# Patient Record
Sex: FEMALE | Race: WHITE | NOT HISPANIC OR LATINO | Employment: UNEMPLOYED | URBAN - METROPOLITAN AREA
[De-identification: names, ages, dates, MRNs, and addresses within clinical notes are randomized per-mention and may not be internally consistent; named-entity substitution may affect disease eponyms.]

---

## 2017-01-05 ENCOUNTER — ALLSCRIPTS OFFICE VISIT (OUTPATIENT)
Dept: OTHER | Facility: OTHER | Age: 47
End: 2017-01-05

## 2017-01-06 ENCOUNTER — HOSPITAL ENCOUNTER (OUTPATIENT)
Dept: MAMMOGRAPHY | Facility: CLINIC | Age: 47
Discharge: HOME | End: 2017-01-06
Payer: COMMERCIAL

## 2017-01-06 ENCOUNTER — HOSPITAL ENCOUNTER (OUTPATIENT)
Dept: ULTRASOUND IMAGING | Facility: CLINIC | Age: 47
Discharge: HOME | End: 2017-01-06
Payer: COMMERCIAL

## 2017-01-06 DIAGNOSIS — M79.9 SOFT TISSUE DISORDER: ICD-10-CM

## 2017-01-06 DIAGNOSIS — IMO0002 LUMP: ICD-10-CM

## 2017-01-06 DIAGNOSIS — N63.0 LUMP OR MASS IN BREAST: ICD-10-CM

## 2017-01-06 PROCEDURE — G0204 DX MAMMO INCL CAD BI: HCPCS

## 2017-01-06 PROCEDURE — 76642 ULTRASOUND BREAST LIMITED: CPT

## 2017-01-06 RX ORDER — IBUPROFEN 200 MG
200 TABLET ORAL EVERY 6 HOURS PRN
COMMUNITY
End: 2019-07-08 | Stop reason: ALTCHOICE

## 2017-01-09 ENCOUNTER — GENERIC CONVERSION - ENCOUNTER (OUTPATIENT)
Dept: OTHER | Facility: OTHER | Age: 47
End: 2017-01-09

## 2017-01-10 ENCOUNTER — HOSPITAL ENCOUNTER (OUTPATIENT)
Dept: MAMMOGRAPHY | Facility: CLINIC | Age: 47
Discharge: HOME | End: 2017-01-10
Payer: COMMERCIAL

## 2017-01-10 ENCOUNTER — HOSPITAL ENCOUNTER (OUTPATIENT)
Dept: ULTRASOUND IMAGING | Facility: CLINIC | Age: 47
Discharge: HOME | End: 2017-01-10
Admitting: INTERNAL MEDICINE
Payer: COMMERCIAL

## 2017-01-10 ENCOUNTER — HOSPITAL ENCOUNTER (OUTPATIENT)
Dept: ULTRASOUND IMAGING | Facility: CLINIC | Age: 47
Discharge: HOME | End: 2017-01-10
Payer: COMMERCIAL

## 2017-01-10 VITALS — DIASTOLIC BLOOD PRESSURE: 72 MMHG | HEART RATE: 68 BPM | SYSTOLIC BLOOD PRESSURE: 118 MMHG

## 2017-01-10 DIAGNOSIS — R92.8 OTHER ABNORMAL AND INCONCLUSIVE FINDINGS ON DIAGNOSTIC IMAGING OF BREAST: ICD-10-CM

## 2017-01-10 DIAGNOSIS — R92.8 ABNORMAL MAMMOGRAM, UNSPECIFIED: ICD-10-CM

## 2017-01-10 PROCEDURE — 19083 BX BREAST 1ST LESION US IMAG: CPT

## 2017-01-10 PROCEDURE — 88305 TISSUE EXAM BY PATHOLOGIST: CPT | Performed by: INTERNAL MEDICINE

## 2017-01-10 RX ORDER — LIDOCAINE HYDROCHLORIDE 10 MG/ML
4 INJECTION, SOLUTION INFILTRATION; PERINEURAL ONCE
Status: COMPLETED | OUTPATIENT
Start: 2017-01-10 | End: 2017-01-10

## 2017-01-10 RX ADMIN — LIDOCAINE HYDROCHLORIDE 4 ML: 10 INJECTION, SOLUTION INFILTRATION; PERINEURAL at 11:34

## 2017-01-12 ENCOUNTER — GENERIC CONVERSION - ENCOUNTER (OUTPATIENT)
Dept: OTHER | Facility: OTHER | Age: 47
End: 2017-01-12

## 2017-01-19 ENCOUNTER — GENERIC CONVERSION - ENCOUNTER (OUTPATIENT)
Dept: OTHER | Facility: OTHER | Age: 47
End: 2017-01-19

## 2017-05-11 ENCOUNTER — ALLSCRIPTS OFFICE VISIT (OUTPATIENT)
Dept: OTHER | Facility: OTHER | Age: 47
End: 2017-05-11

## 2017-07-05 ENCOUNTER — ALLSCRIPTS OFFICE VISIT (OUTPATIENT)
Dept: OTHER | Facility: OTHER | Age: 47
End: 2017-07-05

## 2017-07-05 DIAGNOSIS — Z00.00 ENCOUNTER FOR GENERAL ADULT MEDICAL EXAMINATION WITHOUT ABNORMAL FINDINGS: ICD-10-CM

## 2017-07-05 DIAGNOSIS — R79.9 ABNORMAL FINDING OF BLOOD CHEMISTRY: ICD-10-CM

## 2017-07-11 ENCOUNTER — APPOINTMENT (OUTPATIENT)
Dept: LAB | Facility: CLINIC | Age: 47
End: 2017-07-11
Payer: COMMERCIAL

## 2017-07-11 ENCOUNTER — TRANSCRIBE ORDERS (OUTPATIENT)
Dept: LAB | Facility: CLINIC | Age: 47
End: 2017-07-11

## 2017-07-11 ENCOUNTER — GENERIC CONVERSION - ENCOUNTER (OUTPATIENT)
Dept: OTHER | Facility: OTHER | Age: 47
End: 2017-07-11

## 2017-07-11 DIAGNOSIS — R79.9 ABNORMAL FINDING OF BLOOD CHEMISTRY: ICD-10-CM

## 2017-07-11 DIAGNOSIS — Z00.00 ENCOUNTER FOR GENERAL ADULT MEDICAL EXAMINATION WITHOUT ABNORMAL FINDINGS: ICD-10-CM

## 2017-07-11 LAB
ALBUMIN SERPL BCP-MCNC: 3.7 G/DL (ref 3.5–5)
ALP SERPL-CCNC: 58 U/L (ref 46–116)
ALT SERPL W P-5'-P-CCNC: 20 U/L (ref 12–78)
ANION GAP SERPL CALCULATED.3IONS-SCNC: 9 MMOL/L (ref 4–13)
AST SERPL W P-5'-P-CCNC: 14 U/L (ref 5–45)
BASOPHILS # BLD AUTO: 0.02 THOUSANDS/ΜL (ref 0–0.1)
BASOPHILS NFR BLD AUTO: 0 % (ref 0–1)
BILIRUB SERPL-MCNC: 0.6 MG/DL (ref 0.2–1)
BUN SERPL-MCNC: 11 MG/DL (ref 5–25)
CALCIUM SERPL-MCNC: 8.7 MG/DL (ref 8.3–10.1)
CHLORIDE SERPL-SCNC: 103 MMOL/L (ref 100–108)
CHOLEST SERPL-MCNC: 160 MG/DL (ref 50–200)
CO2 SERPL-SCNC: 28 MMOL/L (ref 21–32)
CREAT SERPL-MCNC: 0.74 MG/DL (ref 0.6–1.3)
EOSINOPHIL # BLD AUTO: 0.09 THOUSAND/ΜL (ref 0–0.61)
EOSINOPHIL NFR BLD AUTO: 2 % (ref 0–6)
ERYTHROCYTE [DISTWIDTH] IN BLOOD BY AUTOMATED COUNT: 13 % (ref 11.6–15.1)
GFR SERPL CREATININE-BSD FRML MDRD: >60 ML/MIN/1.73SQ M
GLUCOSE P FAST SERPL-MCNC: 90 MG/DL (ref 65–99)
HCT VFR BLD AUTO: 39.8 % (ref 34.8–46.1)
HDLC SERPL-MCNC: 72 MG/DL (ref 40–60)
HGB BLD-MCNC: 12.9 G/DL (ref 11.5–15.4)
LDLC SERPL CALC-MCNC: 79 MG/DL (ref 0–100)
LYMPHOCYTES # BLD AUTO: 1.17 THOUSANDS/ΜL (ref 0.6–4.47)
LYMPHOCYTES NFR BLD AUTO: 20 % (ref 14–44)
MCH RBC QN AUTO: 30.3 PG (ref 26.8–34.3)
MCHC RBC AUTO-ENTMCNC: 32.4 G/DL (ref 31.4–37.4)
MCV RBC AUTO: 93 FL (ref 82–98)
MONOCYTES # BLD AUTO: 0.57 THOUSAND/ΜL (ref 0.17–1.22)
MONOCYTES NFR BLD AUTO: 10 % (ref 4–12)
NEUTROPHILS # BLD AUTO: 4.02 THOUSANDS/ΜL (ref 1.85–7.62)
NEUTS SEG NFR BLD AUTO: 68 % (ref 43–75)
PLATELET # BLD AUTO: 225 THOUSANDS/UL (ref 149–390)
PMV BLD AUTO: 11.8 FL (ref 8.9–12.7)
POTASSIUM SERPL-SCNC: 3.7 MMOL/L (ref 3.5–5.3)
PROT SERPL-MCNC: 7.2 G/DL (ref 6.4–8.2)
RBC # BLD AUTO: 4.26 MILLION/UL (ref 3.81–5.12)
SODIUM SERPL-SCNC: 140 MMOL/L (ref 136–145)
TRIGL SERPL-MCNC: 43 MG/DL
WBC # BLD AUTO: 5.87 THOUSAND/UL (ref 4.31–10.16)

## 2017-07-11 PROCEDURE — 85025 COMPLETE CBC W/AUTO DIFF WBC: CPT

## 2017-07-11 PROCEDURE — 80053 COMPREHEN METABOLIC PANEL: CPT

## 2017-07-11 PROCEDURE — 36415 COLL VENOUS BLD VENIPUNCTURE: CPT

## 2017-07-11 PROCEDURE — 80061 LIPID PANEL: CPT

## 2017-12-11 ENCOUNTER — ALLSCRIPTS OFFICE VISIT (OUTPATIENT)
Dept: OTHER | Facility: OTHER | Age: 47
End: 2017-12-11

## 2017-12-11 DIAGNOSIS — D25.9 LEIOMYOMA OF UTERUS: ICD-10-CM

## 2017-12-11 DIAGNOSIS — Z12.31 ENCOUNTER FOR SCREENING MAMMOGRAM FOR MALIGNANT NEOPLASM OF BREAST: ICD-10-CM

## 2017-12-12 NOTE — PROGRESS NOTES
Assessment    1  Encounter for routine gynecological examination (V72 31) (Z01 419)   2  Visit for screening mammogram (V76 12) (Z12 31)   3  Pap smear, as part of routine gynecological examination (V76 2) (Z01 419)   4  Uterine leiomyoma, unspecified location (218 9) (D25 9)    Plan  Encounter for routine gynecological examination    · Follow-up visit in 1 year Evaluation and Treatment  Follow-up  Status: Hold For -Scheduling  Requested for: 92SUO5001   Ordered;Encounter for routine gynecological examination; Ordered By: Roger Bobby Performed:  Due: 24WKD6879  Encounter for routine gynecological examination, Pap smear, as part of routinegynecological examination    · (1) THIN PREP PAP WITH IMAGING; Status: In Progress - Specimen/DataCollected,Retrospective By Protocol Authorization;   Done: 62RPM1912   Perform:[facility]; Order Comments:CervicalEndocervical; Due:74Jcw3316; Last Updated By:John Joaquin; 12/11/2017 9:48:12 AM;Ordered;for routine gynecological examination, Pap smear, as part of routine gynecological examination; Ordered By:Haily Shepherd; Maturation index required? : No  : 11/17/2017  HPV? : Regardless of Interpretation  Uterine fibroid    · * US PELVIS COMPLETE (TRANSABDOMINAL AND TRANSVAGINAL); Status:Hold For -Scheduling,Retrospective By Protocol Authorization; Requested for:[id number];    Perform:[facility] Radiology; Due:40Hkk5291; Last Updated By:John Joaquin; 12/11/2017 10:52:28 AM;Ordered;fibroid; Ordered By:Haily Shepherd; Visit for screening mammogram    · * MAMMO SCREENING BILATERAL W CAD; Status:Hold For - ExactDate,Scheduling,Retrospective By Protocol Authorization; Requested for:Jan 2018; Perform:[facility] Radiology; JOQ:[id number]; Last Updated By:John Joaquin; 12/11/2017 9:48:12 AM;Ordered;for screening mammogram; Ordered By:Haily Shepherd; Discussion/Summary  healthy adult female Currently, she eats a healthy diet   cervical cancer screening is current Pap test was done today Breast cancer screening: monthly self breast exam was advised, mammogram is current and mammogram has been ordered  Advice and education were given regarding nutrition, aerobic exercise, calcium supplements, vitamin D supplements and sunscreen use  Normal GYN Exam  Stressed  ordered  SMear DOne  GYN Exam in 1 year  if any problems develop during the interim        ***Check PELVIC US to ensue Fibroid Stability  The patient has the current Goals: [address]  The patent has the current Barriers: None  Patient is able to Self-Care  PATIENT EDUCATION RECORD She was given the following educational materials:  Ideas for a Healthy Life Style  The treatment plan was reviewed with the patient/guardian  The patient/guardian understands and agrees with the treatment plan     Self Referrals: No      Chief Complaint  Annual Visithas no problem or concernis doing very      History of Present Illness  HPI: Periods are STABLE 16 - 26 days No erratic bleeding    Moderate cramping  Bowel & Bladder habits  pelvic or abdominal pain   GYN , Adult Female [facility]: The patient is being seen for a health maintenance and gynecology evaluation  The last health maintenance visit was 1 year(s) ago  General Health: The patient's health since the last visit is described as good  She has regular dental visits  -- She denies vision problems  -- She denies hearing loss  Immunizations status: up to date  Lifestyle:  She consumes a diverse and healthy diet  -- She does not have any weight concerns  -- She exercises regularly  -- She does not use tobacco -- She denies alcohol use  -- She denies drug use  Reproductive health:  she reports no menstrual problems  Screening: cancer screening reviewed and current  metabolic screening reviewed and current  risk screening reviewed and current  Review of Systems   Constitutional: No fever, no chills, feels well, no tiredness, no recent weight gain or loss    ENT: no ear ache, no loss of hearing, no nosebleeds or nasal discharge, no sore throat or hoarseness  Cardiovascular: no complaints of slow or fast heart rate, no chest pain, no palpitations, no leg claudication or lower extremity edema  Respiratory: no complaints of shortness of breath, no wheezing, no dyspnea on exertion, no orthopnea or PND  Breasts: no complaints of breast pain, breast lump or nipple discharge  Gastrointestinal: no complaints of abdominal pain, no constipation, no nausea or diarrhea, no vomiting, no bloody stools  Genitourinary: no complaints of dysuria, no incontinence, no pelvic pain, no dysmenorrhea, no vaginal discharge or abnormal vaginal bleeding  Musculoskeletal: no complaints of arthralgia, no myalgia, no joint swelling or stiffness, no limb pain or swelling  Integumentary: no complaints of skin rash or lesion, no itching or dry skin, no skin wounds  Neurological: no complaints of headache, no confusion, no numbness or tingling, no dizziness or fainting  Active Problems    1  Abnormal blood chemistry (790 6) (R79 9)   2  Abnormal mammogram (793 80) (R92 8)   3  Abnormal uterine bleeding (AUB) (626 9) (N93 9)   4  Acute bronchitis (466 0) (J20 9)   5  Acute otitis media (382 9) (H66 90)   6  Cervicalgia (723 1) (M54 2)   7  Chronic low back pain (724 2,338 29) (M54 5,G89 29)   8  Depression with anxiety (300 4) (F41 8)   9  Encounter for routine gynecological examination (V72 31) (Z01 419)   10  Encounter for screening mammogram for malignant neoplasm of breast (V76 12)  (Z12 31)   11  Fibroadenoma of right breast (217) (D24 1)   12  Headache (784 0) (R51)   13  Lyme disease (088 81) (A69 20)   14  Neck pain (723 1) (M54 2)   15  Need for influenza vaccination (V04 81) (Z23)   16  Need for prophylactic vaccination and inoculation against influenza (V04 81) (Z23)   17  Neuralgia (729 2) (M79 2)   18  Nevus, atypical (216 9) (D22 9)   19  Obsessive compulsive disorder (300 3) (F42 9)   20   Occipital neuralgia (723 8) (M54 81)   21  Pain in joint of left shoulder (719 41) (M25 512)   22  Pap smear, as part of routine gynecological examination (V76 2) (Z01 419)   23  Right hip pain (719 45) (M25 551)   24  Routine Gynecological Exam With Cervical Pap Smear (V72 31)   25  Soft tissue mass (729 90) (M79 9)   26  Urinary frequency (788 41) (R35 0)   27  Uterine fibroid (218 9) (D25 9)   28  Uterine leiomyoma, unspecified location (218 9) (D25 9)   29  UTI (urinary tract infection) (599 0) (N39 0)   30  Visit for screening mammogram (V76 12) (Z12 31)   31   Visual disturbances (368 9) (H53 9)    Past Medical History   · History of Abnormal uterine bleeding (AUB) (626 9) (N93 9)   · Acute sinusitis (461 9) (J01 90)   · Acute upper respiratory infection (465 9) (J06 9)   · History of pregnancy (V13 29)   · History of Mammogram   · History of Menarche (V21 8)   · History of Reported Pap Smear   · History of Uses birth control (V25 9) (Z30 9)   · History of Uterine leiomyoma, unspecified location (218 9) (D25 9)    Surgical History   · History of Biopsy Breast Percutaneous Needle Core   · History of  Section   · History of Oral Surgery Tooth Extraction   · History of Tubal Ligation    Family History  Mother    · No pertinent family history  Maternal Uncle    · Family history of Colon cancer   · Family history of malignant neoplasm of prostate (V16 42) (Z80 45)  Family History    · Family history of Family Health Status Of Father -    · Family history of Family Health Status Of Mother -    · Family history of Maternal Grandfather Is    · Family history of Maternal Grandmother Is    · Family history of Paternal Grandfather Is    · Family history of Paternal Grandmother Is     Social History   · Being A Social Drinker   · Daily Coffee Consumption (2  Cups/Day)   · Denied: History of Drug Use   · Educational Level - Completed 2nd Year College   · Denied: History of Home Environment Domestic Violence   · Living Independently With Spouse   · Marital History - Currently    · Never a smoker   · Non-smoker (V49 89) (Z78 9)   · Occupation: Homemaker    Current Meds   1  No Reported Medications  Requested for: 65KJL5514 Recorded    Allergies  1  No Known Drug Allergies  2  No Known Environmental Allergies   3  No Known Food Allergies    Vitals   Recorded: 61TZK6340 77:87TM   Systolic 193   Diastolic 60   Height 5 ft 2 5 in   Weight 147 lb 8 oz   BMI Calculated 26 55   BSA Calculated 1 69   LMP 91AYT0561       Physical Exam   Constitutional  General appearance: No acute distress, well appearing and well nourished  Neck  Neck: Normal, supple, trachea midline, no masses  Thyroid: Normal, no thyromegaly  Pulmonary  Respiratory effort: No increased work of breathing or signs of respiratory distress  Auscultation of lungs: Clear to auscultation  Cardiovascular  Auscultation of heart: Normal rate and rhythm, normal S1 and S2, no murmurs  Peripheral vascular exam: Normal pulses Throughout  Genitourinary  External genitalia: Normal and no lesions appreciated  Vagina: Normal, no lesions or dryness appreciated  Urethra: Normal    Urethral meatus: Normal    Bladder: Normal, soft, non-tender and no prolapse or masses appreciated  Cervix: Normal, no palpable masses  Uterus: Normal, non-tender, not enlarged, and no palpable masses  Adnexa/parametria: Normal, non-tender and no fullness or masses appreciated  Anus, perineum, and rectum: Normal sphincter tone, no masses, and no prolapse  Chest  Breasts: Normal and no dimpling or skin changes noted  Abdomen  Abdomen: Normal, non-tender, and no organomegaly noted  Liver and spleen: No hepatomegaly or splenomegaly  Examination for hernias: No hernias appreciated  Stool sample for occult blood: Negative     Lymphatic  Palpation of lymph nodes in neck, axillae, groin and/or other locations: No lymphadenopathy or masses noted    Skin  Skin and subcutaneous tissue: Normal skin turgor and no rashes     Palpation of skin and subcutaneous tissue: Normal    Psychiatric  Orientation to person, place, and time: Normal    Mood and affect: Normal        Provider Comments  Provider Comments:   4 children doing well    Oldest wants [employer] in [address] to go to  [address] is 17 then boy at 16 Daughter at 8      Future Appointments    Date/Time Provider Specialty Site   07/06/2018 08:00 AM Darwin Garzon DO Internal [address]   12/28/2017 02:00 PM [address] [place] [address] OB/GYN       Signatures   Electronically signed by : JEET Foley ; Dec 11 2017  1:21PM EST                       (Author)

## 2017-12-13 ENCOUNTER — LAB CONVERSION - ENCOUNTER (OUTPATIENT)
Dept: OTHER | Facility: OTHER | Age: 47
End: 2017-12-13

## 2017-12-13 LAB
ADDITIONAL INFORMATION (HISTORICAL): NORMAL
ADEQUACY: (HISTORICAL): NORMAL
COMMENT (HISTORICAL): NORMAL
CYTOTECHNOLOGIST: (HISTORICAL): NORMAL
HPV MRNA E6/E7 (HISTORICAL): NOT DETECTED
INTERPRETATION (HISTORICAL): NORMAL
LMP (HISTORICAL): NORMAL
PREV. BX: (HISTORICAL): NORMAL
PREV. PAP (HISTORICAL): NORMAL
SOURCE (HISTORICAL): NORMAL

## 2018-01-06 DIAGNOSIS — Z12.31 ENCOUNTER FOR SCREENING MAMMOGRAM FOR MALIGNANT NEOPLASM OF BREAST: ICD-10-CM

## 2018-01-09 NOTE — RESULT NOTES
Message   Notify the patient normal comprehensive metabolic panel follow up as scheduled        Verified Results  (1) COMPREHENSIVE METABOLIC PANEL 44JWQ7080 74:70VM Brie Bur   TW Order Number: [id number]  TW Order Number: [id number] Order Number: [id number]     Test Name Result Flag Reference   GLUCOSE,RANDM 91 mg/dL     If the patient is fasting, the ADA then defines impaired fasting glucose as > 100 mg/dL and diabetes as > or equal to 123 mg/dL  SODIUM 140 mmol/L  136-145   POTASSIUM 4 4 mmol/L  3 5-5 3   CHLORIDE 103 mmol/L  100-108   CARBON DIOXIDE 30 mmol/L  21-32   ANION GAP (CALC) 7 mmol/L  4-13   BLOOD UREA NITROGEN 13 mg/dL  5-25   CREATININE 0 83 mg/dL  0 60-1 30   Standardized to IDMS reference method   CALCIUM 8 9 mg/dL  8 3-10 1   BILI, TOTAL 0 60 mg/dL  0 20-1 00   ALK PHOSPHATAS 57 U/L     ALT (SGPT) 24 U/L  12-78   AST(SGOT) 21 U/L  5-45   ALBUMIN 4 0 g/dL  3 5-5 0   TOTAL PROTEIN 7 4 g/dL  6 4-8 2   eGFR Non-African American      >60 0 ml/min/1 73sq [facility] Disease Education Program recommendations are as follows:  GFR calculation is accurate only with a steady state creatinine  Chronic Kidney disease less than 60 ml/min/1 73 sq  meters  Kidney failure less than 15 ml/min/1 73 sq  meters         Signatures   Electronically signed by : Isaias Tony DO; Jun 28 2016  7:36PM EST                       (Author)

## 2018-01-09 NOTE — RESULT NOTES
Message   Spoke with the patient she is aware of the mammogram results she is going for biopsy tomorrow follow up as scheduled        Verified Results  *US BREAST LEFT LIMITED (DIAGNOSTIC) [id number] 10:43AM [facility] Luisa [facility] Order Number: [id number]    - Patient Instructions: To schedule this appointment, please contact Central Scheduling at [phone]  Test Name Result Flag Reference   US BREAST LEFT LIMITED (Report)     Patient History:   No known family history of cancer  Took hormonal contraceptives for 2 years  Patient has never smoked  Patient's BMI is 23 8  Reason for exam: clinical finding  Mammo Diagnostic Bilateral W CAD: January 6, 2017 - Check In #:    [de-identified]   Bilateral MLO, CC, ML, and XCCL view(s) were taken  Spot    compression CC and spot compression MLO view(s) were taken of the   right breast      Technologist: Kelli Landa   Prior study comparison: January 14, 2016, mammo screening    bilateral W CAD, performed at [address] [place] [address]  January 8, 2015, digital bilateral screening mammogram, performed   at [address] [place] [address]  There are scattered fibroglandular densities  These images were    obtained using digital technique and with the assistance of    Computer Aided Detection  There are no dominant masses, foci of    architectural distortion or suspicious clusters of calcification    to suggest malignancy  The visualized skin appears normal  A    slowly enlarging vague density is noted in the upper outer    posterior right breast  Targeted ultrasound of this area    demonstrates a 9 mm hypoechoic lesion at the 10 o'clock position,   6 m from the nipple  Its ultrasound appearance is relatively    benign, however, given its interval enlargement on mammography,    an ultrasound-guided core biopsy is recommended       Targeted ultrasound over the palpable abnormality in the superior   left breast (outside of the scope of mammography) demonstrates    no evidence of hypoechoic mass or architectural distortion to    suggest malignancy  US Breast Right Limited: January 6, 2017 - Check In #: [de-identified]   Technologist: RT Zoila (R), KARY     US Breast Left Limited: January 6, 2017 - Check In #: [de-identified]   Technologist: RT Zoila (R), KARY     ASSESSMENT: BiRad:4 - Suspicious (Overall)   Diag Mammogram: BiRad:4 - suspicious abnormality in the right    breast    Right breast Right Brst US: BiRad:4 - suspicious abnormality in    the right breast    Right breast Right Brst US: BiRad:4 - suspicious abnormality in    the left breast      Recommendation:   Ultrasound-guided biopsy of the right breast    Analyzed by CAD     Transcription Location: [facility]   Signing Station: [id number]     Risk Value(s):   Tyrer-Cuzick 10 Year: 1 754%, Tyrer-Cuzick Lifetime: 9 779%,    Myriad Table: 1 5%, DAYAMI 5 Year: 0 9%, NCI Lifetime: 10 6%   Signed by:   Bradley Batista MD   1/6/17     MAMMO DIAGNOSTIC BILATERAL W CAD [id number] 10:42AM Marce Delgado     Test Name Result Flag Reference   MAMMO DIAGNOSTIC BILATERAL W CAD (Report)     Patient History:   No known family history of cancer  Took hormonal contraceptives for 2 years  Patient has never smoked  Patient's BMI is 23 8  Reason for exam: clinical finding  Mammo Diagnostic Bilateral W CAD: January 6, 2017 - Check In #:    [de-identified]   Bilateral MLO, CC, ML, and XCCL view(s) were taken  Spot    compression CC and spot compression MLO view(s) were taken of the   right breast      Technologist: Kelli Landa   Prior study comparison: January 14, 2016, mammo screening    bilateral W CAD, performed at [facility]  January 8, 2015, digital bilateral screening mammogram, performed   at [address] [place] [address]  There are scattered fibroglandular densities  These images were    obtained using digital technique and with the assistance of    Computer Aided Detection   There are no dominant masses, foci of    architectural distortion or suspicious clusters of calcification    to suggest malignancy  The visualized skin appears normal  A    slowly enlarging vague density is noted in the upper outer    posterior right breast  Targeted ultrasound of this area    demonstrates a 9 mm hypoechoic lesion at the 10 o'clock position,   6 m from the nipple  Its ultrasound appearance is relatively    benign, however, given its interval enlargement on mammography,    an ultrasound-guided core biopsy is recommended  Targeted ultrasound over the palpable abnormality in the superior   left breast (outside of the scope of mammography) demonstrates    no evidence of hypoechoic mass or architectural distortion to    suggest malignancy       US Breast Right Limited: January 6, 2017 - Check In #: [de-identified]   Technologist: Amaury Angela, RT (R), RDMS     US Breast Left Limited: January 6, 2017 - Check In #: [de-identified]   Technologist: Amaury Angela, RT (R), RDMS     ASSESSMENT: BiRad:4 - Suspicious (Overall)   Diag Mammogram: BiRad:4 - suspicious abnormality in the right    breast    Right breast Right Brst US: BiRad:4 - suspicious abnormality in    the right breast    Right breast Right Brst US: BiRad:4 - suspicious abnormality in    the left breast      Recommendation:   Ultrasound-guided biopsy of the right breast    Analyzed by CAD     Transcription Location: [address]   Signing Station: [id number]     Risk Value(s):   Tyrer-Cuzick 10 Year: 1 754%, Tyrer-Cuzick Lifetime: 9 779%,    Myriad Table: 1 5%, DAYAMI 5 Year: 0 9%, NCI Lifetime: 10 6%   Signed by:   Naeem Stoll MD   1/6/17       Signatures   Electronically signed by : Jaylen Bianchi DO; Jan 9 2017  5:36PM EST                       (Author)

## 2018-01-10 NOTE — RESULT NOTES
Message   spoke with the patient she is aware of the findings of the mammogram and ultrasound she is going for a biopsy tomorrow        Verified Results  *US BREAST LEFT LIMITED (DIAGNOSTIC) [id number] 10:43AM [facility] Order Number: [id number]    - Patient Instructions: To schedule this appointment, please contact Central Scheduling at [phone]  Test Name Result Flag Reference   US BREAST LEFT LIMITED (Report)     Patient History:   No known family history of cancer  Took hormonal contraceptives for 2 years  Patient has never smoked  Patient's BMI is 23 8  Reason for exam: clinical finding  Mammo Diagnostic Bilateral W CAD: January 6, 2017 - Check In #:    [de-identified]   Bilateral MLO, CC, ML, and XCCL view(s) were taken  Spot    compression CC and spot compression MLO view(s) were taken of the   right breast      Technologist: Miguel Rowe   Prior study comparison: January 14, 2016, mammo screening    bilateral W CAD, performed at [address] [place] [address]  January 8, 2015, digital bilateral screening mammogram, performed   at [address] [place] [address]  There are scattered fibroglandular densities  These images were    obtained using digital technique and with the assistance of    Computer Aided Detection  There are no dominant masses, foci of    architectural distortion or suspicious clusters of calcification    to suggest malignancy  The visualized skin appears normal  A    slowly enlarging vague density is noted in the upper outer    posterior right breast  Targeted ultrasound of this area    demonstrates a 9 mm hypoechoic lesion at the 10 o'clock position,   6 m from the nipple  Its ultrasound appearance is relatively    benign, however, given its interval enlargement on mammography,    an ultrasound-guided core biopsy is recommended       Targeted ultrasound over the palpable abnormality in the superior   left breast (outside of the scope of mammography) demonstrates    no evidence of hypoechoic mass or architectural distortion to    suggest malignancy       US Breast Right Limited: January 6, 2017 - Check In #: [de-identified]   Technologist: Amaury Angela, RT (R), RDMS     US Breast Left Limited: January 6, 2017 - Check In #: [de-identified]   Technologist: Evansibernardo Angela, RT (R), RDMS     ASSESSMENT: BiRad:4 - Suspicious (Overall)   Diag Mammogram: BiRad:4 - suspicious abnormality in the right    breast    Right breast Right Brst US: BiRad:4 - suspicious abnormality in    the right breast    Right breast Right Brst US: BiRad:4 - suspicious abnormality in    the left breast      Recommendation:   Ultrasound-guided biopsy of the right breast    Analyzed by CAD     Transcription Location: [address]   Signing Station: [id number]     Risk Value(s):   Tyrer-Cuzick 10 Year: 1 754%, Tyrer-Cuzick Lifetime: 9 779%,    Myriad Table: 1 5%, DAYAMI 5 Year: 0 9%, NCI Lifetime: 10 6%   Signed by:   Naeem Stoll MD   1/6/17       Signatures   Electronically signed by : Jaylen Bianchi DO; Jan 9 2017  5:35PM EST                       (Author)

## 2018-01-10 NOTE — PROGRESS NOTES
Assessment    1  Non-smoker (V49 89) (Z78 9)   2  Chronic low back pain (724 2,338 29) (M54 5,G89 29)   3  Right hip pain (719 45) (M25 551)   4  Nevus, atypical (216 9) (D22 9)   5  Encounter for preventive health examination (V70 0) (Z00 00)    Plan  Abnormal blood chemistry, Health Maintenance    · (1) LIPID PANEL, FASTING; Status:Active; Requested [id number]:[id number]; Health Maintenance    · (1) COMPREHENSIVE METABOLIC PANEL; Status:Active; Requested [id number]:[id number];   Nevus, atypical    · 2 - Bryant MARIO, Pierre Arreola  (Dermatology) Physician Referral  Consult  Status: Hold For -  Scheduling  Requested for: [id number]  Care Summary provided  : Yes  Right hip pain    · * XR HIP 2+ VIEW RIGHT; Status:Active; Requested KQU:[id number];    · * XR SPINE LUMBAR 2 OR 3 VIEWS; Status:Active; Requested UKQ:[id number];       Assessment and plan #1 annual wellness examination was completed for patient overall the patient is clinically stable doing very well think urge her to continue to follow healthy and balanced diet and encourage her to continue with her routine exercise we do encourage the patient to get an annual mammogram which was completed, get a flu vaccine in the fall and also follow-up with OB/GYN which we will be doing next 1 week  Overall she is doing very well  Older her comprehensive metabolic panel, lipid panel fasting  Chronic lower back pain right side and right hip pain suspect the patient probably likely has degenerative arthritis of the disc will check an x-ray of the lumbar spine also the right hip  I did mention to patient she may try Tylenol arthritis at bedtime as needed, we encouraged the patient to stretch routinely encouraged yoga or could consider stretching or Pilates reopen  RTO 3 months call if any problems  History of Present Illness  HM, Adult Female: The patient is being seen for a health maintenance evaluation  The last health maintenance visit was 1 year(s) ago     Social History: Household members include spouse, 1 daughter(s) and 3 son(s)  She is   Work status: occupation: Homemaker and Volunteers  The patient has never smoked cigarettes  She reports occasional alcohol use and drinking 1-2, 2 x amonth drinks per week  The patient has no concerns about alcohol abuse  She has never used illicit drugs  General Health: The patient's health since the last visit is described as good  She has regular dental visits  The patient brushes 2 time(s) a day, reports her last dental visit was 5/2016 and uses a water pik  She complains of vision problems  Vision care includes wearing reading glasses and an eye examination within the last year  She denies hearing loss  Lifestyle:  She consumes a diverse and healthy diet (fish 0, chicken 3-4/week, turkey ; red 1/week)  Dietary details include 1-2 servings of fruit per day, 1-2 servings of vegetables per day, 1 servings of meat per day, 64 ounces of water per day, 1-2 servings of dairy foods per day and 2 cups of coffee per day  She does not have any weight concerns  She exercises regularly  She exercises 3-4 times per week for 30-50 minutes per session  Exercise includes walking  She does not use tobacco  The patient has never smoked cigarettes, has never used smokeless tobacco, has never smoked a pipe and has never smoked cigars  She consumes alcohol  She reports occasional alcohol use, her last drink was every 2 weeks ago and drinking 2 drinks every 2 drinks per week  She typically drinks wine, but no beer consumption and no hard liquor consumption  Alcohol concern: The patient has no concerns about alcohol abuse  She denies drug use  She has never used illicit drugs  Reproductive health:  she reports normal menses  she uses no contraception  she is sexually active  pregnancy history: G 5P 3(miscarriages: 2 )  Screening: Cervical cancer screening includes a pap smear performed last year   Breast cancer screening includes a mammogram performed last year  She hasn't been previously screened for colorectal cancer  Safety elements used: seat belt, safe driving habits, sunscreen, smoke detector and carbon monoxide detector, but no CPR training for the patient and no CPR training for household members  Risk findings: no passive smoke exposure, no anxiety symptoms, no depression symptoms and no travel to developing areas  HPI: 15-year-old female who brings to my attention that she's been having right lower back pain for last several years there was no injury without any radiation or weakness numbness tingling no bowel or bladder incontinence not had a desiccation at this point time she feels pain when she turns from side to side  She does experience it on a daily basis  Active Problems    1  Abnormal blood chemistry (790 6) (R79 9)   2  Abnormal uterine bleeding (AUB) (626 9) (N93 9)   3  Acute bronchitis (466 0) (J20 9)   4  Acute otitis media (382 9) (H66 90)   5  Cervicalgia (723 1) (M54 2)   6  Depression with anxiety (300 4) (F41 8)   7  Encounter for routine gynecological examination (V72 31) (Z01 419)   8  Encounter for screening mammogram for malignant neoplasm of breast (V76 12)   (Z12 31)   9  Headache (784 0) (R51)   10  Lyme disease (088 81) (A69 20)   11  Neck pain (723 1) (M54 2)   12  Need for prophylactic vaccination and inoculation against influenza (V04 81) (Z23)   13  Neuralgia (729 2) (M79 2)   14  Obsessive compulsive disorder (300 3) (F42)   15  Occipital neuralgia (723 8) (M54 81)   16  Pain in joint of left shoulder (719 41) (M25 512)   17  Pap smear, as part of routine gynecological examination (V76 2) (Z01 419)   18  Routine Gynecological Exam With Cervical Pap Smear (V72 31)   19  Urinary frequency (788 41) (R35 0)   20  UTI (urinary tract infection) (599 0) (N39 0)   21  Visit for screening mammogram (V76 12) (Z12 31)   22   Visual disturbances (368 9) (H53 9)    Past Medical History    · History of Abnormal uterine bleeding (AUB) (626 9) (N93 9)   · Acute sinusitis (461 9) (J01 90)   · Acute upper respiratory infection (465 9) (J06 9)   · History of Mammogram   · History of Reported Pap Smear    Surgical History    · History of  Section   · History of Oral Surgery Tooth Extraction   · History of Oral Surgery Tooth Extraction   · History of Tubal Ligation    Family History  Mother    · No pertinent family history  Family History    · Family history of Family Health Status Of Father -    · Family history of Family Health Status Of Mother -    · Family history of Maternal Grandfather Is    · Family history of Maternal Grandmother Is    · Family history of Paternal Grandfather Is    · Family history of Paternal Grandmother Is     Social History    · Being A Social Drinker   · Daily Coffee Consumption (2  Cups/Day)   · Denied: History of Drug Use   · Educational Level - Completed 2nd Year College   · Denied: History of Home Environment Domestic Violence   · Living Independently With Spouse   · Marital History - Currently    · Non-smoker (V49 89) (Z78 9)   · Occupation: Homemaker    Current Meds   1  No Reported Medications Recorded    Allergies    1  No Known Drug Allergies    2  No Known Environmental Allergies   3  No Known Food Allergies    Vitals   Recorded: 16WKS2906 10:16AM   Heart Rate 72   Respiration 18   Systolic 92, RUE, Sitting   Diastolic 62, RUE, Sitting   Height 5 ft 2 6 in   Weight 141 lb 0 2 oz   BMI Calculated 25 3   BSA Calculated 1 66     Physical Exam    Nurse Kevin Orozco in the room during the examination there is some atypical mole irregular borders with multiple variegated discoloration of the mole right lateral chest wall Examination of the lumbar spine straight leg raising 90 degrees  bilaterally, DTRs 2 over 4, there is some mild tenderness over the paravertebral musculature of occult to to L3 region, sensation intact, muscle strength 5 over 5   Forward flexion 90 degrees  extension 20 degrees  side bending 25 degrees  bilaterally     Constitutional   General appearance: No acute distress, well appearing and well nourished  Head and Face   Head and face: Normal     Eyes   Conjunctiva and lids: No swelling, erythema or discharge  Pupils and irises: Equal, round, reactive to light  Ears, Nose, Mouth, and Throat   External inspection of ears and nose: Normal     Otoscopic examination: Tympanic membranes translucent with normal light reflex  Canals patent without erythema  Hearing: Normal     Nasal mucosa, septum, and turbinates: Normal without edema or erythema  Lips, teeth, and gums: Normal, good dentition  Oropharynx: Normal with no erythema, edema, exudate or lesions  Neck   Neck: Supple, symmetric, trachea midline, no masses  Thyroid: Normal, no thyromegaly  Pulmonary   Respiratory effort: No increased work of breathing or signs of respiratory distress  Auscultation of lungs: Clear to auscultation  Cardiovascular   Auscultation of heart: Normal rate and rhythm, normal S1 and S2, no murmurs  Pedal pulses: 2+ bilaterally  Examination of extremities for edema and/or varicosities: Normal     Abdomen   Abdomen: Non-tender, no masses  Liver and spleen: No hepatomegaly or splenomegaly  Lymphatic   Palpation of lymph nodes in neck: No lymphadenopathy  Psychiatric   Mood and affect: Normal        Future Appointments    Date/Time Provider Specialty Site   07/12/2016 09:30 AM JEET Johnson   Obstetrics/Gynecology [facility] [address] [facility]     Signatures   Electronically signed by : Dunia Espinoza DO; Jun 27 2016 11:15AM EST                       (Author)

## 2018-01-10 NOTE — RESULT NOTES
Message      Notify the patient normal lipid level follow up as scheduled        Verified Results  (1) COMPREHENSIVE METABOLIC PANEL [id number] 86:30QG Serena Jimenez    Order Number: [id number]  TW Order Number: [id number] Order Number: [id number]     Test Name Result Flag Reference   GLUCOSE,RANDM 91 mg/dL     If the patient is fasting, the ADA then defines impaired fasting glucose as > 100 mg/dL and diabetes as > or equal to 123 mg/dL  SODIUM 140 mmol/L  136-145   POTASSIUM 4 4 mmol/L  3 5-5 3   CHLORIDE 103 mmol/L  100-108   CARBON DIOXIDE 30 mmol/L  21-32   ANION GAP (CALC) 7 mmol/L  4-13   BLOOD UREA NITROGEN 13 mg/dL  5-25   CREATININE 0 83 mg/dL  0 60-1 30   Standardized to IDMS reference method   CALCIUM 8 9 mg/dL  8 3-10 1   BILI, TOTAL 0 60 mg/dL  0 20-1 00   ALK PHOSPHATAS 57 U/L     ALT (SGPT) 24 U/L  12-78   AST(SGOT) 21 U/L  5-45   ALBUMIN 4 0 g/dL  3 5-5 0   TOTAL PROTEIN 7 4 g/dL  6 4-8 2   eGFR Non-African American      >60 0 ml/min/1 73sq [facility] Disease Education Program recommendations are as follows:  GFR calculation is accurate only with a steady state creatinine  Chronic Kidney disease less than 60 ml/min/1 73 sq  meters  Kidney failure less than 15 ml/min/1 73 sq  meters  (1) LIPID PANEL, FASTING 28Jun2016 10:14AM Hohenwaldaleksandra Villalpandos    Order Number: [id number]  TW Order Number: [id number] Order Number: [id number]     Test Name Result Flag Reference   CHOLESTEROL 174 mg/dL     HDL,DIRECT 72 mg/dL H 40-60   Specimen collection should occur prior to Metamizole administration due to the potential for falsely depressed results     LDL CHOLESTEROL CALCULATED 96 mg/dL  0-100   Triglyceride:         Normal              <150 mg/dl       Borderline High    150-199 mg/dl       High               200-499 mg/dl       Very High          >499 mg/dl  Cholesterol:         Desirable        <200 mg/dl      Borderline High  200-239 mg/dl      High             >239 mg/dl  HDL Cholesterol:        High    >59 mg/dL      Low     <41 mg/dL  LDL CALCULATED:    This screening LDL is a calculated result  It does not have the accuracy of the Direct Measured LDL in the monitoring of patients with hyperlipidemia and/or statin therapy  Direct Measure LDL (TFK578) must be ordered separately in these patients  TRIGLYCERIDES 32 mg/dL  <=150   Specimen collection should occur prior to N-Acetylcysteine or Metamizole administration due to the potential for falsely depressed results         Signatures   Electronically signed by : Selena Bellamy DO; Jun 28 2016  7:37PM EST                       (Author)

## 2018-01-11 NOTE — PROGRESS NOTES
Assessment    1  Acute bronchitis (466 0) (J20 9)    Plan  Acute bronchitis    · Azithromycin 250 MG Oral Tablet; 2 tabs PO on day 1 then 1 tab PO daily x 4 days   · Promethazine-Codeine 6 25-10 MG/5ML Oral Syrup; TAKE 5 ML BY MOUTH AT  BEDTIME AS NEEDED    Discussion/Summary    Increase oral fluids  Call if nto improving  The patient was counseled regarding instructions for management  total time of encounter was 15 minutes  Possible side effects of new medications were reviewed with the patient/guardian today  The treatment plan was reviewed with the patient/guardian  The patient/guardian understands and agrees with the treatment plan      Chief Complaint  Patient presents to office today with a cold that's been going on since 1/17/16  History of Present Illness  HPI: 5 days of a sinus congestion, drainage  progressed to a cough, little mucus  +headache worse than usual  no vomiting, diarrhea  currently taking Mucinex D, ibuprofen  tearful as she had similar symptoms in the past and ended up with bilateral pneumonia      Review of Systems    Constitutional: as noted in HPI    ENT: as noted in HPI  Respiratory: as noted in HPI  Gastrointestinal: no vomiting and no diarrhea  Active Problems    1  Abnormal blood chemistry (790 6) (R79 9)   2  Abnormal uterine bleeding (AUB) (626 9) (N93 9)   3  Cervicalgia (723 1) (M54 2)   4  Depression with anxiety (300 4) (F41 8)   5  Encounter for routine gynecological examination (V72 31) (Z01 419)   6  Encounter for screening mammogram for malignant neoplasm of breast (V76 12)   (Z12 31)   7  Headache (784 0) (R51)   8  Lyme disease (088 81) (A69 20)   9  Neck pain (723 1) (M54 2)   10  Need for prophylactic vaccination and inoculation against influenza (V04 81) (Z23)   11  Neuralgia (729 2) (M79 2)   12  Obsessive compulsive disorder (300 3) (F42)   13  Occipital neuralgia (723 8) (M54 81)   14  Pain in joint of left shoulder (719 41) (M25 512)   15   Pap smear, as part of routine gynecological examination (V76 2) (Z12 4)   16  Routine Gynecological Exam With Cervical Pap Smear (V72 31)   17  Urinary frequency (788 41) (R35 0)   18  UTI (urinary tract infection) (599 0) (N39 0)   19  Visit for screening mammogram (V76 12) (Z12 31)   20  Visual disturbances (368 9) (H53 9)    Past Medical History  Active Problems And Past Medical History Reviewed: The active problems and past medical history were reviewed and updated today  Social History    · Being A Social Drinker   · Daily Coffee Consumption (2  Cups/Day)   · Denied: History of Drug Use   · Educational Level - Completed 2nd Year College   · Denied: History of Home Environment Domestic Violence   · Living Independently With Spouse   · Marital History - Currently    · Never A Smoker   · Occupation: Homemaker  The social history was reviewed and updated today  Family History  Family History Reviewed: The family history was reviewed and updated today  Current Meds   1  No Reported Medications Recorded    The medication list was reviewed and updated today  Allergies    1  No Known Drug Allergies    2  No Known Environmental Allergies   3  No Known Food Allergies    Vitals   Recorded: 21Jan2016 03:06PM   Temperature 99 3 F, Oral   Heart Rate 401   Systolic 736, Sitting   Diastolic 78, Sitting   Height 5 ft 3 in   Weight 141 lb 8 oz   BMI Calculated 25 07   BSA Calculated 1 67   O2 Saturation 99     Physical Exam    Constitutional   General appearance: No acute distress, well appearing and well nourished  Eyes   Conjunctiva and lids: No swelling, erythema or discharge  Ears, Nose, Mouth, and Throat   Otoscopic examination: Tympanic membranes translucent with normal light reflex  Canals patent without erythema  Oropharynx: Normal with no erythema, edema, exudate or lesions  Pulmonary   Respiratory effort: Abnormal   Respiratory Findings: dry cough     Auscultation of lungs: Clear to auscultation  Cardiovascular   Auscultation of heart: Normal rate and rhythm, normal S1 and S2, without murmurs  Lymphatic   Palpation of lymph nodes in neck: No lymphadenopathy  Future Appointments    Date/Time Provider Specialty Site   06/27/2016 10:00 AM Alisha Alfred DO [facility] [address] [place] [address]   07/12/2016 09:30 AM JEET Ellison   Obstetrics/Gynecology [address]  OB/GYN     Signatures   Electronically signed by : JEET Franklin ; Jan 22 2016 12:49PM EST                       (Author)

## 2018-01-12 NOTE — RESULT NOTES
Message   notify the patient the x-ray of the hip no acute osseous abnormality, but the lower back does show arthritic changes  please have the patient follow-up with me to discuss X-ray ;also the x-ray shows a probable bone islands in bilateral iliacs please have the patient follow-up to discuss the report        Verified Results  * XR HIP 2+ VIEW RIGHT 28Jun2016 10:13AM Milad Amin Order Number: [id number]     Test Name Result Flag Reference   * XR HIP/PELV 2-3 VWS RIGHT (Report)     RIGHT HIP     INDICATION: Right hip pain  COMPARISON: None     VIEWS: AP pelvis and 2 coned down views of the hip; 4 images     FINDINGS:     There is no acute fracture or dislocation  Minimal lower lumbar degenerative change  No significant hip degenerative changes  Probable bone islands in the bilateral iliacs  Soft tissues are unremarkable  IMPRESSION:     No acute osseous abnormality  DICTATED JEET Taylor     Signed by:   Vinita Kawasaki   6/30/16       Signatures   Electronically signed by : Spencer Estevez DO; Jun 30 2016  8:34PM EST                       (Author)

## 2018-01-12 NOTE — RESULT NOTES
Message   Spoke with the patient she is aware of the ultrasound result she is going for biopsy tomorrow        Verified Results  *US BREAST RIGHT LIMITED (DIAGNOSTIC) [id number] 12:02PM Christina Minor     Test Name Result Flag Reference   US BREAST RIGHT LIMITED (Report)     Patient History:   No known family history of cancer  Took hormonal contraceptives for 2 years  Patient has never smoked  Patient's BMI is 23 8  Reason for exam: clinical finding  Mammo Diagnostic Bilateral W CAD: January 6, 2017 - Check In #:    [de-identified]   Bilateral MLO, CC, ML, and XCCL view(s) were taken  Spot    compression CC and spot compression MLO view(s) were taken of the   right breast      Technologist: Jimi Pete   Prior study comparison: January 14, 2016, mammo screening    bilateral W CAD, performed at [address] [place] [address]  January 8, 2015, digital bilateral screening mammogram, performed   at [address] [place] [address]  There are scattered fibroglandular densities  These images were    obtained using digital technique and with the assistance of    Computer Aided Detection  There are no dominant masses, foci of    architectural distortion or suspicious clusters of calcification    to suggest malignancy  The visualized skin appears normal  A    slowly enlarging vague density is noted in the upper outer    posterior right breast  Targeted ultrasound of this area    demonstrates a 9 mm hypoechoic lesion at the 10 o'clock position,   6 m from the nipple  Its ultrasound appearance is relatively    benign, however, given its interval enlargement on mammography,    an ultrasound-guided core biopsy is recommended  Targeted ultrasound over the palpable abnormality in the superior   left breast (outside of the scope of mammography) demonstrates    no evidence of hypoechoic mass or architectural distortion to    suggest malignancy       US Breast Right Limited: January 6, 2017 - Check In #: [de-identified] Technologist: RT Nate (R)KARY     US Breast Left Limited: January 6, 2017 - Check In #: [de-identified]   Technologist: RT Nate (R)KARY     ASSESSMENT: BiRad:4 - Suspicious (Overall)   Diag Mammogram: BiRad:4 - suspicious abnormality in the right    breast    Right breast Right Brst US: BiRad:4 - suspicious abnormality in    the right breast    Right breast Right Brst US: BiRad:4 - suspicious abnormality in    the left breast      Recommendation:   Ultrasound-guided biopsy of the right breast    Analyzed by CAD     Transcription Location: [address]   Signing Station: [id number]     Risk Value(s):   Tyrer-Cuzick 10 Year: 1 754%, Tyrer-Cuzick Lifetime: 9 779%,    Myriad Table: 1 5%, DAYAMI 5 Year: 0 9%, NCI Lifetime: 10 6%   Signed by:   Alcides Sykes MD   1/6/17     *US BREAST LEFT LIMITED (DIAGNOSTIC) [id number] 10:43AM Lucila KENNEDY Order Number: [id number]    - Patient Instructions: To schedule this appointment, please contact Central Scheduling at [phone]  Test Name Result Flag Reference   US BREAST LEFT LIMITED (Report)     Patient History:   No known family history of cancer  Took hormonal contraceptives for 2 years  Patient has never smoked  Patient's BMI is 23 8  Reason for exam: clinical finding  Mammo Diagnostic Bilateral W CAD: January 6, 2017 - Check In #:    [de-identified]   Bilateral MLO, CC, ML, and XCCL view(s) were taken  Spot    compression CC and spot compression MLO view(s) were taken of the   right breast      Technologist: Caleb Reyes   Prior study comparison: January 14, 2016, mammo screening    bilateral W CAD, performed at [address] [place] [address]  January 8, 2015, digital bilateral screening mammogram, performed   at [address] [place] [address]  There are scattered fibroglandular densities  These images were    obtained using digital technique and with the assistance of    Computer Aided Detection   There are no dominant masses, foci of architectural distortion or suspicious clusters of calcification    to suggest malignancy  The visualized skin appears normal  A    slowly enlarging vague density is noted in the upper outer    posterior right breast  Targeted ultrasound of this area    demonstrates a 9 mm hypoechoic lesion at the 10 o'clock position,   6 m from the nipple  Its ultrasound appearance is relatively    benign, however, given its interval enlargement on mammography,    an ultrasound-guided core biopsy is recommended  Targeted ultrasound over the palpable abnormality in the superior   left breast (outside of the scope of mammography) demonstrates    no evidence of hypoechoic mass or architectural distortion to    suggest malignancy  US Breast Right Limited: January 6, 2017 - Check In #: [de-identified]   Technologist: RT Nisha (R), KARY     US Breast Left Limited: January 6, 2017 - Check In #: [de-identified]   Technologist: RT Nisha (R), KARY     ASSESSMENT: BiRad:4 - Suspicious (Overall)   Diag Mammogram: BiRad:4 - suspicious abnormality in the right    breast    Right breast Right Brst US: BiRad:4 - suspicious abnormality in    the right breast    Right breast Right Brst US: BiRad:4 - suspicious abnormality in    the left breast      Recommendation:   Ultrasound-guided biopsy of the right breast    Analyzed by CAD     Transcription Location: [facility]   Signing Station: [id number]     Risk Value(s):   Tyrer-Cuzick 10 Year: 1 754%, Tyrer-Cuzick Lifetime: 9 779%,    Myriad Table: 1 5%, DAYAMI 5 Year: 0 9%, NCI Lifetime: 10 6%   Signed by:   Varsha Aviles MD   1/6/17     MAMMO DIAGNOSTIC BILATERAL W CAD [id number] 10:42AM Nuno Cooper     Test Name Result Flag Reference   MAMMO DIAGNOSTIC BILATERAL W CAD (Report)     Patient History:   No known family history of cancer  Took hormonal contraceptives for 2 years  Patient has never smoked  Patient's BMI is 23 8  Reason for exam: clinical finding       Mammo Diagnostic Bilateral W CAD: January 6, 2017 - Check In #:    [de-identified]   Bilateral MLO, CC, ML, and XCCL view(s) were taken  Spot    compression CC and spot compression MLO view(s) were taken of the   right breast      Technologist: Ama Astudillo   Prior study comparison: January 14, 2016, mammo screening    bilateral W CAD, performed at [facility]  January 8, 2015, digital bilateral screening mammogram, performed   at [address] [facility]  There are scattered fibroglandular densities  These images were    obtained using digital technique and with the assistance of    Computer Aided Detection  There are no dominant masses, foci of    architectural distortion or suspicious clusters of calcification    to suggest malignancy  The visualized skin appears normal  A    slowly enlarging vague density is noted in the upper outer    posterior right breast  Targeted ultrasound of this area    demonstrates a 9 mm hypoechoic lesion at the 10 o'clock position,   6 m from the nipple  Its ultrasound appearance is relatively    benign, however, given its interval enlargement on mammography,    an ultrasound-guided core biopsy is recommended  Targeted ultrasound over the palpable abnormality in the superior   left breast (outside of the scope of mammography) demonstrates    no evidence of hypoechoic mass or architectural distortion to    suggest malignancy       US Breast Right Limited: January 6, 2017 - Check In #: [de-identified]   Technologist: RT Ngozi (R), KARY     US Breast Left Limited: January 6, 2017 - Check In #: [de-identified]   Technologist: RT Ngozi (R), KARY     ASSESSMENT: BiRad:4 - Suspicious (Overall)   Diag Mammogram: BiRad:4 - suspicious abnormality in the right    breast    Right breast Right Brst US: BiRad:4 - suspicious abnormality in    the right breast    Right breast Right Brst US: BiRad:4 - suspicious abnormality in    the left breast      Recommendation:   Ultrasound-guided biopsy of the right breast    Analyzed by CAD     Transcription Location: [facility] [address]: [id number]     Risk Value(s):   Tyrer-Cuzick 10 Year: 1 754%, Tyrer-Cuzick Lifetime: 9 779%,    Myriad Table: 1 5%, DAYAMI 5 Year: 0 9%, NCI Lifetime: 10 6%   Signed by:   Ada Umaña MD   1/6/17       Signatures   Electronically signed by : Nanda Lockwood DO; Jan 9 2017  5:36PM EST                       (Author)

## 2018-01-13 ENCOUNTER — HOSPITAL ENCOUNTER (OUTPATIENT)
Dept: RADIOLOGY | Age: 48
Discharge: HOME | End: 2018-01-13
Payer: COMMERCIAL

## 2018-01-13 VITALS
HEIGHT: 63 IN | DIASTOLIC BLOOD PRESSURE: 82 MMHG | TEMPERATURE: 99.1 F | SYSTOLIC BLOOD PRESSURE: 118 MMHG | WEIGHT: 142 LBS | OXYGEN SATURATION: 98 % | HEART RATE: 75 BPM | RESPIRATION RATE: 14 BRPM | BODY MASS INDEX: 25.16 KG/M2

## 2018-01-13 DIAGNOSIS — Z12.31 ENCOUNTER FOR SCREENING MAMMOGRAM FOR MALIGNANT NEOPLASM OF BREAST: ICD-10-CM

## 2018-01-13 PROCEDURE — 77067 SCR MAMMO BI INCL CAD: CPT

## 2018-01-13 NOTE — MISCELLANEOUS
Message  Spoke with patient regarding endometrial biopsy results  Per Dr Luis Armando Greenberg, benign  Will followfor bleeding pattern        Signatures   Electronically signed by : Jeremy Jansen, [facility]; Jan 22 2016 12:28PM EST                       (Author)

## 2018-01-15 VITALS
DIASTOLIC BLOOD PRESSURE: 88 MMHG | RESPIRATION RATE: 16 BRPM | BODY MASS INDEX: 25.34 KG/M2 | HEIGHT: 63 IN | HEART RATE: 72 BPM | SYSTOLIC BLOOD PRESSURE: 116 MMHG | WEIGHT: 143.03 LBS

## 2018-01-15 NOTE — RESULT NOTES
Message   Notify the patient the pathology report does show a benign fibroadenoma please have the patient see surgical oncologist Dr Buddy Carr follow up as scheduled        Verified Results  MAMMO PATHOLOGY REPORT (NO CHARGE) [id number] 11:45AM Ada Lucas     Test Name Result Flag Reference   MAMMO PATHOLOGY REPORT (NO CHARGE) (Report)     Mammo Pathology Letter: Right Breast - January 10, 2017 - Check    In #: [de-identified]   DEAR DR Yara Padilla     Thank you for the recent referral of the above named patient to    [place] [address] for RIGHT breast    ultrasound-guided core biopsy  The results of her recent    procedure have been made available to me from pathology  The results are consistent with a benign fibroadenoma  No    evidence for atypia or malignancy demonstrated  The pathology is concordant with the radiographic appearance  The recommendation is for return to screening mammography  Our nurse navigators, depending on your known office preferences,   will ensure that the results and recommendations have been    communicated to the patient  If there is any clarification    needed, please do not hesitate to contact the [address] at [phone]  Thank you,     JEET Hernandez       Transcription Location:  [place] [address]: [id number]       Signatures   Electronically signed by : Candido Hull DO; Jan 19 2017  8:01PM EST                       (Author)

## 2018-01-15 NOTE — PROCEDURES
Plan  Abnormal uterine bleeding (AUB)    · (1) TISSUE EXAM; Status:Active - Retrospective By Protocol Authorization; Requested  LEXIS:[id number];    Perform:[employer]; Kat Sainz; XLL:[id number]; Last Updated By:Humberto Joaquin; 1/12/2016 12:21:35 PM;Ordered; For:Abnormal uterine bleeding (AUB); Ordered By:Valerie Shepherd; Impression? : N93 9  Sources(s) : Polyp, Cervical/Endometrial   · Endometrial Biopsy - POC; Status:Active - Retrospective By Protocol Authorization; Requested CUQ:[id number];    Perform: In Office; PUQ:[id number]; Last Updated By:Humberto Joaquin; 1/12/2016 12:21:35 PM;Ordered; For:Abnormal uterine bleeding (AUB); Ordered By:Valerie Shepherd; Discussion/Summary  Discussion Summary:   Dx: AUB      EBX done without difficulty      Adequate sample obtained      Restrictions given      Questions answered      Further Rx pending results         Active Problems    1  Abnormal blood chemistry (790 6) (R79 9)   2  Abnormal uterine bleeding (AUB) (626 9) (N93 9)   3  Cervicalgia (723 1) (M54 2)   4  Depression with anxiety (300 4) (F41 8)   5  Encounter for routine gynecological examination (V72 31) (Z01 419)   6  Encounter for screening mammogram for malignant neoplasm of breast (V76 12)   (Z12 31)   7  Headache (784 0) (R51)   8  Lyme disease (088 81) (A69 20)   9  Neck pain (723 1) (M54 2)   10  Need for prophylactic vaccination and inoculation against influenza (V04 81) (Z23)   11  Neuralgia (729 2) (M79 2)   12  Obsessive compulsive disorder (300 3) (F42)   13  Occipital neuralgia (723 8) (M54 81)   14  Pain in joint of left shoulder (719 41) (M25 512)   15  Pap smear, as part of routine gynecological examination (V76 2) (Z12 4)   16  Routine Gynecological Exam With Cervical Pap Smear (V72 31)   17  Urinary frequency (788 41) (R35 0)   18  UTI (urinary tract infection) (599 0) (N39 0)   19  Visit for screening mammogram (V76 12) (Z12 31)   20   Visual disturbances (368 9) (H53 9)    Current Meds 1  No Reported Medications Recorded    Allergies    1  No Known Drug Allergies    2  No Known Environmental Allergies   3  No Known Food Allergies    Procedure    Procedure: Endometrial biopsy  Indication: abnormal uterine bleeding  Risks were discussed with the patient  Procedure Note:   The cervix was prepped with betadine  Anesthesia: none  The cervix was stabilized with a single toothed tenaculum  The cervix allowed easy passage of a uterine sound without dilation  The uterus was in mid position and sounded to 8 cm  A Pipelle endometrial suction curette was inserted into the uterine cavity  Using a combination of suction and rotation, samples were obtained from all four quadrants  A moderate amount of blood and tissue were obtained  the specimen was placed in buffered formalin and sent for pathlogy  Patient Status: the patient tolerated the procedure well  Complications: there were no complications  The patient was instructed to follow up to review results        Future Appointments    Date/Time Provider Specialty Site   06/27/2016 10:00 AM Valeria Marin DO [facility]     Signatures   Electronically signed by : JEET Dailey ; Jan 12 2016 12:47PM EST                       (Author)

## 2018-01-15 NOTE — RESULT NOTES
Message   Notify the patient the biopsy shows a fibroadenoma, negative for atypia or malignancy please have the patient see surgical oncologist Dr Buddy Carr and follow-up as scheduled (ernie)        Verified Results  (1) TISSUE EXAM [id number] 11:38AM Ada Lucas     Test Name Result Flag Reference   LAB AP CASE REPORT (Report)     Surgical Pathology Report             Case: [id number]                   Authorizing Provider: Candido Hull DO   Collected:      01/10/2017 1138        Ordering Location:   [address] [place] [address] Breast Received:      01/10/2017 5518 Doug Tilley                                     Pathologist:      Judith Cohn MD                             Specimen:  Breast, Right, Ultrasound guided right breast biopsy 1000, 6 CMFN, 3 passes, 12G   LAB AP FINAL DIAGNOSIS      A  Breast, right at 10:00 6 cm from nipple, ultrasound-guided biopsy:  - Fibroadenoma  - Negative for atypia or malignancy  Electronically signed by Judith Cohn MD on 1/11/2017 at 2:30 PM   LAB AP SURGICAL ADDITIONAL INFORMATION (Report)     These tests were developed and their performance characteristics   determined by [facility] or [facility]  They may not be cleared or approved by the [place]  Food and   Drug Administration  The FDA has determined that such clearance or   approval is not necessary  These tests are used for clinical purposes  They should not be regarded as investigational or for research  This   laboratory has been approved by CLIA 88, designated as a high-complexity   laboratory and is qualified to perform these tests  Interpretation performed at [facility]   LAB AP GROSS DESCRIPTION (Report)     A   The specimen is received in formalin, labeled with the patient's name   and hospital number, and is designated ultrasound guided right breast   biopsy 1000 6cmfn 3 passes 12g, are four irregularly shaped fragments of   tan-yellow to red and focally hemorrhagic fibrofatty tissue measuring 1 3   x 1 0 x 0 1 cm in aggregate  Entirely submitted  Two cassettes  Note: The estimated total formalin fixation time based upon information   provided by the submitting clinician and the standard processing schedule   is 9 0 hours        RLR   LAB AP CLINICAL INFORMATION Fixed in Formalin     Fixed in Formalin       Signatures   Electronically signed by : Jovanna Zhao DO; Jan 12 2017  8:02PM EST                       (Author)

## 2018-01-16 NOTE — RESULT NOTES
Message   Notify the patient the x-ray of the lumbar spine does show minimal arthritic changes of the posterior facets L4-S1; she is a very good candidate for physical therapy please let me know if the patient would be interested to starting this        Verified Results  * XR SPINE LUMBAR 2 OR 3 VIEWS 28Jun2016 10:13AM Maria Luisa Houston Order Number: [id number]     Test Name Result Flag Reference   XR SPINE LUMBAR 2 OR 3 VIEWS (Report)     LUMBAR SPINE     INDICATION: Back pain  COMPARISON: None     VIEWS: AP, lateral and coned-down projections; 3 images     FINDINGS:     Alignment is unremarkable  There is no radiographic evidence of acute fracture or destructive osseous lesion  Minimal degenerative change in the posterior facets predominantly at L4-S1  Visualized soft tissues appear unremarkable  IMPRESSION:     Minimal degenerative change         Workstation performed: [id number]     Signed by:   Corbin Santos MD   6/29/16       Signatures   Electronically signed by : Baylee Handy DO; Jun 29 2016 10:18AM EST                       (Author)

## 2018-01-16 NOTE — RESULT NOTES
Verified Results  (1) LIPID PANEL FASTING W DIRECT LDL REFLEX [id number] 09:40AM Wan Jackson    Order Number: [id number]     Test Name Result Flag Reference   CHOLESTEROL 160 mg/dL     LDL CHOLESTEROL CALCULATED 79 mg/dL  0-100   Triglyceride:         Normal              <150 mg/dl       Borderline High    150-199 mg/dl       High               200-499 mg/dl       Very High          >499 mg/dl  Cholesterol:         Desirable        <200 mg/dl      Borderline High  200-239 mg/dl      High             >239 mg/dl  HDL Cholesterol:        High    >59 mg/dL      Low     <41 mg/dL  LDL Cholesterol:        Optimal          <100 mg/dl        Near Optimal     100-129 mg/dl        Above Optimal          Borderline High   130-159 mg/dl          High              160-189 mg/dl          Very High        >189 mg/dl  LDL CALCULATED:    This screening LDL is a calculated result  It does not have the accuracy of the Direct Measured LDL in the monitoring of patients with hyperlipidemia and/or statin therapy  Direct Measure LDL (YFN163) must be ordered separately in these patients  TRIGLYCERIDES 43 mg/dL  <=150   Specimen collection should occur prior to N-Acetylcysteine or Metamizole administration due to the potential for falsely depressed results  HDL,DIRECT 72 mg/dL H 40-60   Specimen collection should occur prior to Metamizole administration due to the potential for falsely depressed results  (1) CBC/PLT/DIFF [id number] 09:40AM Wancorrina Jackson    Order Number: [id number]     Test Name Result Flag Reference   WBC COUNT 5 87 Thousand/uL  4 31-10 16   RBC COUNT 4 26 Million/uL  3 81-5 12   HEMOGLOBIN 12 9 g/dL  11 5-15 4   HEMATOCRIT 39 8 %  34 8-46  1   MCV 93 fL  82-98   MCH 30 3 pg  26 8-34 3   MCHC 32 4 g/dL  31 4-37 4   RDW 13 0 %  11 6-15 1   MPV 11 8 fL  8 9-12 7   PLATELET COUNT 051 Thousands/uL  149-390   NEUTROPHILS RELATIVE PERCENT 68 %  43-75   LYMPHOCYTES RELATIVE PERCENT 20 %  14-44 MONOCYTES RELATIVE PERCENT 10 %  4-12   EOSINOPHILS RELATIVE PERCENT 2 %  0-6   BASOPHILS RELATIVE PERCENT 0 %  0-1   NEUTROPHILS ABSOLUTE COUNT 4 02 Thousands/? ??L  1 85-7 62   LYMPHOCYTES ABSOLUTE COUNT 1 17 Thousands/? ??L  0 60-4 47   MONOCYTES ABSOLUTE COUNT 0 57 Thousand/? ??L  0 17-1 22   EOSINOPHILS ABSOLUTE COUNT 0 09 Thousand/? ??L  0 00-0 61   BASOPHILS ABSOLUTE COUNT 0 02 Thousands/? ??L  0 00-0 10     (1) COMPREHENSIVE METABOLIC PANEL [id number] 55:33ZC [employer]   TW Order Number: [id number]     Test Name Result Flag Reference   SODIUM 140 mmol/L  136-145   POTASSIUM 3 7 mmol/L  3 5-5 3   CHLORIDE 103 mmol/L  100-108   CARBON DIOXIDE 28 mmol/L  21-32   ANION GAP (CALC) 9 mmol/L  4-13   BLOOD UREA NITROGEN 11 mg/dL  5-25   CREATININE 0 74 mg/dL  0 60-1 30   Standardized to IDMS reference method   CALCIUM 8 7 mg/dL  8 3-10 1   BILI, TOTAL 0 60 mg/dL  0 20-1 00   ALK PHOSPHATAS 58 U/L     ALT (SGPT) 20 U/L  12-78   AST(SGOT) 14 U/L  5-45   ALBUMIN 3 7 g/dL  3 5-5 0   TOTAL PROTEIN 7 2 g/dL  6 4-8 2   eGFR Non-African American      >60 0 ml/min/1 73sq [facility] Disease Education Program recommendations are as follows:  GFR calculation is accurate only with a steady state creatinine  Chronic Kidney disease less than 60 ml/min/1 73 sq  meters  Kidney failure less than 15 ml/min/1 73 sq  meters     GLUCOSE FASTING 90 mg/dL  65-99

## 2018-01-17 NOTE — PROGRESS NOTES
Assessment    1  Non-smoker (V49 89) (Z78 9)   2  Encounter for preventive health examination (V70 0) (Z00 00)    Plan   (1) LIPID PANEL FASTING W DIRECT LDL REFLEX; Status:Resulted - Requires Verification;   Done: [id number] 12:00AM  LPN:[id number]; Ordered; For:Abnormal blood chemistry, Health Maintenance; Ordered By:Terell Lancaster;   (1) CBC/PLT/DIFF; Status:Resulted - Requires Verification;   Done: 47ZFO1962 12:00AM  MIREYA:[id number]; Ordered;    For:Health Maintenance; Ordered By:Terell Lancaster;   (1) COMPREHENSIVE METABOLIC PANEL; Status:Resulted - Requires Verification;   Done: 52KQL8041 12:00AM  OVT:[id number]; Ordered;    For:Health Maintenance; Ordered By:Terell Lancaster;      Discussion/Summary  health maintenance visit Currently, she eats a healthy diet and eats an adequate diet  cervical cancer screening is current Breast cancer screening: mammogram is current  The immunizations are up to date  Advice and education were given regarding nutrition, aerobic exercise, weight bearing exercise, calcium supplements, vitamin D supplements, sunscreen use, self skin examination, helmet use and seat belt use  Patient discussion: discussed with the patient  Eat healthy and exercise  RTO in one year  Chief Complaint  Wellness      History of Present Illness  HM, Adult Female: The patient is being seen for a health maintenance evaluation  The last health maintenance visit was 1 year(s) ago  General Health: The patient's health since the last visit is described as good  She has regular dental visits  She denies vision problems  She denies hearing loss  Lifestyle:  She consumes a diverse and healthy diet  She does not have any weight concerns  She exercises regularly  She does not use tobacco  She denies alcohol use  She denies drug use  Reproductive health: the patient is premenopausal   she reports normal menses  Screening: cancer screening reviewed and current   Cervical cancer screening includes a pap smear performed last year  Breast cancer screening includes a mammogram performed last year  She hasn't been previously screened for colorectal cancer  metabolic screening reviewed and current  Metabolic screening includes lipid profile performed within the past five years and glucose screening performed last year  risk screening reviewed and current  Cardiovascular risk factors: no hypertension and no diabetes  Safety elements used: seat belt, bicycle helmet, motorcycle helmet, safe driving habits, sunscreen, smoke detector and carbon monoxide detector  HPI: patient is here for a wellness visit      Review of Systems    Constitutional: no fever, not feeling poorly, no chills and not feeling tired  Eyes: no eye pain, no eyesight problems, eyes not red and no purulent discharge from the eyes  ENT: no earache, no nosebleeds, no hearing loss and no nasal discharge  Cardiovascular: the heart rate was not slow, no chest pain, the heart rate was not fast and no palpitations  Respiratory: no shortness of breath, no cough, no wheezing and no shortness of breath during exertion  Gastrointestinal: no abdominal pain, no nausea, no constipation and no diarrhea  Genitourinary: no dysuria, no incontinence and no dysmenorrhea  Integumentary: no rashes, no itching, no skin lesions and no skin wound  Neurological: no headache, no numbness, no confusion, no dizziness and no fainting  Psychiatric: no anxiety, no sleep disturbances and no depression  Endocrine: no muscle weakness  Hematologic/Lymphatic: no swollen glands  Active Problems    1  Abnormal blood chemistry (790 6) (R79 9)   2  Abnormal mammogram (793 80) (R92 8)   3  Abnormal uterine bleeding (AUB) (626 9) (N93 9)   4  Acute bronchitis (466 0) (J20 9)   5  Acute otitis media (382 9) (H66 90)   6  Cervicalgia (723 1) (M54 2)   7  Chronic low back pain (724 2,338 29) (M54 5,G89 29)   8  Depression with anxiety (300 4) (F41 8)   9   Encounter for routine gynecological examination (V72 31) (Z01 419)   10  Encounter for screening mammogram for malignant neoplasm of breast (V76 12)    (Z12 31)   11  Headache (784 0) (R51)   12  Lyme disease (088 81) (A69 20)   13  Neck pain (723 1) (M54 2)   14  Need for prophylactic vaccination and inoculation against influenza (V04 81) (Z23)   15  Neuralgia (729 2) (M79 2)   16  Nevus, atypical (216 9) (D22 9)   17  Obsessive compulsive disorder (300 3) (F42 9)   18  Occipital neuralgia (723 8) (M54 81)   19  Pain in joint of left shoulder (719 41) (M25 512)   20  Pap smear, as part of routine gynecological examination (V76 2) (Z01 419)   21  Right hip pain (719 45) (M25 551)   22  Routine Gynecological Exam With Cervical Pap Smear (V72 31)   23  Soft tissue mass (729 90) (M79 9)   24  Urinary frequency (788 41) (R35 0)   25  Uterine fibroid (218 9) (D25 9)   26  Uterine leiomyoma, unspecified location (218 9) (D25 9)   27  UTI (urinary tract infection) (599 0) (N39 0)   28  Visit for screening mammogram (V76 12) (Z12 31)   29   Visual disturbances (368 9) (H53 9)    Past Medical History    · History of Abnormal uterine bleeding (AUB) (626 9) (N93 9)   · Acute sinusitis (461 9) (J01 90)   · Acute upper respiratory infection (465 9) (J06 9)   · History of pregnancy (V13 29)   · History of Mammogram   · History of Menarche (V21 8)   · History of Reported Pap Smear   · History of Uses birth control (V25 9) (Z30 9)   · History of Uterine leiomyoma, unspecified location (218 9) (D25 9)    Surgical History    · History of Biopsy Breast Percutaneous Needle Core   · History of  Section   · History of Oral Surgery Tooth Extraction   · History of Oral Surgery Tooth Extraction   · History of Tubal Ligation    Family History  Mother    · No pertinent family history  Maternal Uncle    · Family history of Colon cancer   · Family history of malignant neoplasm of prostate (V16 42) (Z80 45)  Family History    · Family history of Family Health Status Of Father -    · Family history of Family Health Status Of Mother -    · Family history of Maternal Grandfather Is    · Family history of Maternal Grandmother Is    · Family history of Paternal Grandfather Is    · Family history of Paternal Grandmother Is     Social History    · Being A Social Drinker   · Daily Coffee Consumption (2  Cups/Day)   · Denied: History of Drug Use   · Educational Level - Completed 2nd Year College   · Denied: History of Home Environment Domestic Violence   · Living Independently With Spouse   · Marital History - Currently    · Non-smoker (V49 89) (Z78 9)   · Occupation: Homemaker    Current Meds   1  No Reported Medications  Requested for: 95KXA3896 Recorded    Allergies    1  No Known Drug Allergies    2  No Known Environmental Allergies   3  No Known Food Allergies    Vitals   Recorded: 54PZA6943 01:34PM   Temperature 98 2 F   Heart Rate 87   Respiration 16   Systolic 034   Diastolic 76   Height 5 ft 2 5 in   Weight 143 lb 0 8 oz   BMI Calculated 25 75   BSA Calculated 1 67   O2 Saturation 98     Physical Exam    Constitutional   General appearance: No acute distress, well appearing and well nourished  Eyes   Conjunctiva and lids: No swelling, erythema or discharge  Pupils and irises: Equal, round and reactive to light  Ears, Nose, Mouth, and Throat   External inspection of ears and nose: Normal     Otoscopic examination: Tympanic membranes translucent with normal light reflex  Canals patent without erythema  Oropharynx: Normal with no erythema, edema, exudate or lesions  Pulmonary   Respiratory effort: No increased work of breathing or signs of respiratory distress  Auscultation of lungs: Clear to auscultation  Cardiovascular   Auscultation of heart: Normal rate and rhythm, normal S1 and S2, without murmurs      Examination of extremities for edema and/or varicosities: Normal     Abdomen   Abdomen: Non-tender, no masses  Liver and spleen: No hepatomegaly or splenomegaly  Lymphatic   Palpation of lymph nodes in neck: No lymphadenopathy  Musculoskeletal   Gait and station: Normal     Digits and nails: Normal without clubbing or cyanosis  Skin   Skin and subcutaneous tissue: Normal without rashes or lesions  Psychiatric   Orientation to person, place, and time: Normal     Mood and affect: Normal        Results/Data  PHQ-9 Adult Depression Screening 95CDD2674 01:35PM User, s     Test Name Result Flag Reference   PHQ-9 Adult Depression Score 1     Over the last two weeks, how often have you been bothered by any of the following problems? Little interest or pleasure in doing things: Not at all - 0  Feeling down, depressed, or hopeless: Not at all - 0  Trouble falling or staying asleep, or sleeping too much: Several days - 1  Feeling tired or having little energy: Not at all - 0  Poor appetite or over eating: Not at all - 0  Feeling bad about yourself - or that you are a failure or have let yourself or your family down: Not at all - 0  Trouble concentrating on things, such as reading the newspaper or watching television: Not at all - 0  Moving or speaking so slowly that other people could have noticed   Or the opposite -  being so fidgety or restless that you have been moving around a lot more than usual: Not at all - 0  Thoughts that you would be better off dead, or of hurting yourself in some way: Not at all - 0   PHQ-9 Adult Depression Screening Negative     PHQ-9 Difficulty Level Not difficult at all     PHQ-9 Severity Minimal Depression         Future Appointments    Date/Time Provider Specialty Site   07/06/2018 08:00 AM Jakub Guy DO Internal [facility]     Signatures   Electronically signed by : Nhi Thayer; Jul 14 2017  9:01AM EST                       (Author)    Electronically signed by : JEET English ; Jul 14 2017  9:17AM EST (Review)

## 2018-01-22 VITALS
DIASTOLIC BLOOD PRESSURE: 76 MMHG | HEIGHT: 63 IN | HEART RATE: 87 BPM | TEMPERATURE: 98.2 F | WEIGHT: 143.05 LBS | RESPIRATION RATE: 16 BRPM | OXYGEN SATURATION: 98 % | BODY MASS INDEX: 25.35 KG/M2 | SYSTOLIC BLOOD PRESSURE: 112 MMHG

## 2018-01-23 VITALS
WEIGHT: 147.5 LBS | SYSTOLIC BLOOD PRESSURE: 110 MMHG | DIASTOLIC BLOOD PRESSURE: 60 MMHG | HEIGHT: 63 IN | BODY MASS INDEX: 26.13 KG/M2

## 2018-06-19 ENCOUNTER — OFFICE VISIT (OUTPATIENT)
Dept: OBGYN CLINIC | Facility: CLINIC | Age: 48
End: 2018-06-19
Payer: COMMERCIAL

## 2018-06-19 VITALS — WEIGHT: 144.2 LBS | BODY MASS INDEX: 25.95 KG/M2 | SYSTOLIC BLOOD PRESSURE: 120 MMHG | DIASTOLIC BLOOD PRESSURE: 72 MMHG

## 2018-06-19 DIAGNOSIS — Z80.42 FAMILY HISTORY OF PROSTATE CANCER: ICD-10-CM

## 2018-06-19 DIAGNOSIS — Z13.79 GENETIC TESTING: Primary | ICD-10-CM

## 2018-06-19 DIAGNOSIS — Z80.0 FAMILY HISTORY OF COLON CANCER: ICD-10-CM

## 2018-06-19 PROCEDURE — 99214 OFFICE O/P EST MOD 30 MIN: CPT | Performed by: PHYSICIAN ASSISTANT

## 2018-06-19 NOTE — PROGRESS NOTES
Assessment/Plan:    No problem-specific Assessment & Plan notes found for this encounter  Diagnoses and all orders for this visit:    Genetic testing    Family history of colon cancer    Family history of prostate cancer        Counseled patient on general population vs  family history vs  known genetic mutation cancer risk  Discussed BRCA, Mosher syndrome, and other mutations tested for in [facility] panel  Counseled patient on autosomal dominant nature of these mutations  Explained possible results - positive, negative, and negative with variant of unknown significance  Educated patient on possible management changes and risk reduction strategies if a positive is found  Explained to patient that a negative does not mean that she will never get cancer, only that she is not at increased cancer risk from these specific mutations  Discussed possible need for family member follow-up as well  Explained Myriad breast cancer RiskScore  Given [facility] patient information booklet  Answered all patient's questions  Consent given, and sample obtained  Will be sent to [facility] lab for testing  F/u for results in 4 weeks  Time of visit 30 minutes; >50% spent counseling  Subjective:      Patient ID: Everton Peoples is a 53 y o  female  Patient is here for genetic testing secondary to family history of colon cancer under 48  No changes since her last visit  She is of white/non- ancestry  Family is [facility] [place] and [facility]; no known Sikhism ancestry  She has no personal history of cancer  A maternal aunt had colon cancer in her 35s  A maternal uncle had prostate cancer in his 76s  She is perimenopausal   She had her first child at age 35  Patient has never used HRT  She has had one benign breast biopsy          The following portions of the patient's history were reviewed and updated as appropriate: allergies, current medications, past family history, past medical history, past social history, past surgical history and problem list     Review of Systems   Genitourinary: Negative  Objective:      /72   Wt 65 4 kg (144 lb 3 2 oz)   BMI 25 95 kg/m²          Physical Exam   Constitutional: She is oriented to person, place, and time  Vital signs are normal  She appears well-developed and well-nourished  Neurological: She is alert and oriented to person, place, and time  Psychiatric: She has a normal mood and affect   Her behavior is normal  Judgment and thought content normal

## 2018-06-22 ENCOUNTER — TELEPHONE (OUTPATIENT)
Dept: OBGYN CLINIC | Facility: CLINIC | Age: 48
End: 2018-06-22

## 2018-06-22 NOTE — TELEPHONE ENCOUNTER
Spoke with patient regarding genetic testing  She received a call from [facility] stating that insurance was denying coverage  I filled out and faxed a prior authorization form on 6/20 for the lab  I emailed Jeanie Schneider to find out if this had been processed prior to the denial and let patient know if there is any new information  Her out of pocket cost would have been $2,000, so she will cancel test if not covered

## 2018-07-06 ENCOUNTER — OFFICE VISIT (OUTPATIENT)
Dept: INTERNAL MEDICINE CLINIC | Facility: CLINIC | Age: 48
End: 2018-07-06
Payer: COMMERCIAL

## 2018-07-06 VITALS
WEIGHT: 148 LBS | SYSTOLIC BLOOD PRESSURE: 112 MMHG | BODY MASS INDEX: 27.23 KG/M2 | RESPIRATION RATE: 16 BRPM | HEIGHT: 62 IN | HEART RATE: 72 BPM | DIASTOLIC BLOOD PRESSURE: 74 MMHG | OXYGEN SATURATION: 99 %

## 2018-07-06 DIAGNOSIS — Z13.6 SCREENING FOR CARDIOVASCULAR CONDITION: Primary | ICD-10-CM

## 2018-07-06 DIAGNOSIS — Z00.00 WELLNESS EXAMINATION: ICD-10-CM

## 2018-07-06 DIAGNOSIS — Z12.11 SCREENING FOR MALIGNANT NEOPLASM OF COLON: ICD-10-CM

## 2018-07-06 DIAGNOSIS — Z11.4 SCREENING FOR HIV (HUMAN IMMUNODEFICIENCY VIRUS): ICD-10-CM

## 2018-07-06 PROCEDURE — 99396 PREV VISIT EST AGE 40-64: CPT | Performed by: INTERNAL MEDICINE

## 2018-07-06 NOTE — PROGRESS NOTES
Assessment/Plan:           Problem List Items Addressed This Visit     Wellness examination     Assessment and plan 1  Health maintenance annual wellness examination overall the patient is clinically stable and doing well, we encouraged the patient to follow a healthy and balanced diet  We recommend that the patient exercise routinely approximately 30 minutes 5 times per week   We have reviewed the patient's vaccines and have made recommendations for updates if necessary flu vaccine if interested  We will be ordering screening laboratories which are age appropriate  Family history of colon cancer will have the patient see GI for screening colonoscopy  Return to the office in 12 months call if any problems  Other Visit Diagnoses     Screening for cardiovascular condition    -  Primary    Relevant Orders    Comprehensive metabolic panel    Lipid Panel with Direct LDL reflex    Stress test only, exercise    Screening for malignant neoplasm of colon        Relevant Orders    Ambulatory referral to Gastroenterology    Screening for HIV (human immunodeficiency virus)        Relevant Orders    HIV 1/2 AG-AB combo          Return to office 12 months  call if any problems  Subjective:      Patient ID: Osman Walsh is a 53 y o  female      HPI  AWV Clinical     ISAR:   Previous hospitalizations?:  No       Once in a Lifetime Medicare Screening:   EKG performed?:  No    AAA screening performed? (if performed, please add date to Health Maintenance):  No       Medicare Screening Tests and Risk Assessment:   AAA Risk Assessment    Osteoporosis Risk Assessment    HIV Risk Assessment        Drug and Alcohol Use:   Tobacco use    Cigarettes:  never smoker    Smokeless:  never used smokeless tobacco    Tobacco use duration    Tobacco Cessation Readiness    Alcohol use    Alcohol use:  occasional use    Amount of alcohol consumed:  1-2 weekends   Concern about alcohol use:  No    Alcohol Treatment Readiness Illicit Drug Use    Drug use:  never        Diet & Exercise:   Diet   What is your diet?:  Regular   How many servings a day of the following:   Fruits and Vegetables:  3-4 Meat:  1-2   Whole Grains:  2 Simple Carbs:  2   Dairy:  2 Soda:  0   Coffee:  2 Tea:  0   Exercise    Do you currently exercise?:  yes    Frequency:  occasional       Cognitive Impairment Screening:   Cognitive Impairment Screening        Functional Ability/Level of Safety:   Hearing    Hearing difficulties:  No    Hearing Impairment Assessment    Current Activities    Help needed with the folllowing:    ADL    Fall Risk   Have you fallen in the last 12 months?:  No    Injury History       Home Safety:   Do you have handrails and grab-bars in the home?:  Yes    Do you have working smoke alarms and fire extinguisher?:  Yes Do all household members know how to use them?:  Yes   Home Safety Risk Factors       Advanced Directives:   Advanced Directives    Living Will:  No Durable POA for healthcare:  No   Advanced directive:  No    Patient's End of Life Decisions        Urinary Incontinence:       Glaucoma: The following portions of the patient's history were reviewed and updated as appropriate: allergies, current medications, past family history, past medical history, past social history, past surgical history and problem list     Review of Systems      Objective:                    No Follow-up on file  No Known Allergies    History reviewed  No pertinent past medical history    Past Surgical History:   Procedure Laterality Date    BIOPSY CORE NEEDLE Right 01/10/2017    BIOPSY BREAST PERCUTANTOUS NEEDLE CORE     SECTION      DESC: 3/2000,10/2001,10/2005,2007    TUBAL LIGATION      WISDOM TOOTH EXTRACTION       Current Outpatient Prescriptions on File Prior to Visit   Medication Sig Dispense Refill    ibuprofen (MOTRIN) 200 mg tablet Take 200 mg by mouth every 6 (six) hours as needed for mild pain       No current facility-administered medications on file prior to visit  Family History   Problem Relation Age of Onset    Coronary artery disease Mother     Colon cancer Maternal Uncle         AGE 39'S    Prostate cancer Maternal Uncle         AGE 70'S     Social History     Social History    Marital status: /Civil Union     Spouse name: N/A    Number of children: N/A    Years of education: 2ND YEAR COLLEGE     Occupational History    HOMEMAKER      Social History Main Topics    Smoking status: Never Smoker    Smokeless tobacco: Never Used    Alcohol use Yes      Comment: SOCIAL    Drug use: No    Sexual activity: Not on file      Comment: USES BIRTH CONTROL     Other Topics Concern    Not on file     Social History Narrative    DAILY COFFEE CONSUMPTION 2 CUPS A DAY    DENIED---- HOME ENVIRONMENT DOMESTIC VIOLENCE    LIVING INDEPENDENTLY WITH SPOUSE     Vitals:    07/06/18 0756   BP: 112/74   BP Location: Right arm   Patient Position: Sitting   Cuff Size: Standard   Pulse: 72   Resp: 16   SpO2: 99%   Weight: 67 1 kg (148 lb)   Height: 5' 2" (1 575 m)     Results for orders placed or performed in visit on 12/13/17   Thinprep Tis and HPV mRNA E6/E7 (Historical)   Result Value Ref Range    ADDITIONAL INFORMATION None given     LMP (HISTORICAL) [id number]     PREV  PAP (HISTORICAL) NONE GIVEN     PREV  BX: (HISTORICAL) NONE GIVEN     SOURCE Cervix     Adequacy: (HISTORICAL)       Satisfactory for evaluation  Endocervical/transformation zone componentpresent  INTERPRETATION (HISTORICAL) Negative for intraepithelial lesion or malignancy  COMMENT       This Pap test has been evaluated with computerassftopia technology      CYTOTECHNOLOGIST: (HISTORICAL)       KARIE CT(ASCP)CT screening location: [place] [address]    HPV mRNA E6/E7 (HISTORICAL) Not Detected Not Detected     Weight (last 2 days)     Date/Time   Weight    07/06/18 0756  67 1 (148)            Body mass index is 27 07 kg/m²  BP      Temp      Pulse     Resp      SpO2        Vitals:    07/06/18 0756   Weight: 67 1 kg (148 lb)     Vitals:    07/06/18 0756   Weight: 67 1 kg (148 lb)       /74 (BP Location: Right arm, Patient Position: Sitting, Cuff Size: Standard)   Pulse 72   Resp 16   Ht 5' 2" (1 575 m)   Wt 67 1 kg (148 lb)   SpO2 99%   BMI 27 07 kg/m²          Physical Exam   Constitutional: She appears well-developed and well-nourished  HENT:   Head: Normocephalic  Mouth/Throat: Oropharynx is clear and moist    Eyes: Conjunctivae are normal  Pupils are equal, round, and reactive to light  Right eye exhibits no discharge  Left eye exhibits no discharge  No scleral icterus  Neck: Neck supple  Cardiovascular: Normal rate, regular rhythm, normal heart sounds and intact distal pulses  Exam reveals no gallop and no friction rub  No murmur heard  Pulmonary/Chest: Breath sounds normal  No respiratory distress  She has no wheezes  She has no rales  Abdominal: Soft  Bowel sounds are normal  She exhibits no distension and no mass  There is no tenderness  There is no rebound and no guarding  Musculoskeletal: She exhibits no edema or deformity  Lymphadenopathy:     She has no cervical adenopathy  Neurological: She is alert   Coordination normal

## 2018-07-08 NOTE — ASSESSMENT & PLAN NOTE
Assessment and plan 1  Health maintenance annual wellness examination overall the patient is clinically stable and doing well, we encouraged the patient to follow a healthy and balanced diet  We recommend that the patient exercise routinely approximately 30 minutes 5 times per week   We have reviewed the patient's vaccines and have made recommendations for updates if necessary flu vaccine if interested  We will be ordering screening laboratories which are age appropriate  Family history of colon cancer will have the patient see GI for screening colonoscopy  Return to the office in 12 months call if any problems

## 2018-07-10 ENCOUNTER — APPOINTMENT (OUTPATIENT)
Dept: LAB | Facility: CLINIC | Age: 48
End: 2018-07-10
Payer: COMMERCIAL

## 2018-07-10 DIAGNOSIS — Z11.4 SCREENING FOR HIV (HUMAN IMMUNODEFICIENCY VIRUS): ICD-10-CM

## 2018-07-10 DIAGNOSIS — Z13.6 SCREENING FOR CARDIOVASCULAR CONDITION: ICD-10-CM

## 2018-07-10 LAB
ALBUMIN SERPL BCP-MCNC: 3.7 G/DL (ref 3.5–5)
ALP SERPL-CCNC: 55 U/L (ref 46–116)
ALT SERPL W P-5'-P-CCNC: 22 U/L (ref 12–78)
ANION GAP SERPL CALCULATED.3IONS-SCNC: 6 MMOL/L (ref 4–13)
AST SERPL W P-5'-P-CCNC: 14 U/L (ref 5–45)
BILIRUB SERPL-MCNC: 0.7 MG/DL (ref 0.2–1)
BUN SERPL-MCNC: 10 MG/DL (ref 5–25)
CALCIUM SERPL-MCNC: 8.7 MG/DL (ref 8.3–10.1)
CHLORIDE SERPL-SCNC: 101 MMOL/L (ref 100–108)
CHOLEST SERPL-MCNC: 169 MG/DL (ref 50–200)
CO2 SERPL-SCNC: 30 MMOL/L (ref 21–32)
CREAT SERPL-MCNC: 0.7 MG/DL (ref 0.6–1.3)
GFR SERPL CREATININE-BSD FRML MDRD: 104 ML/MIN/1.73SQ M
GLUCOSE P FAST SERPL-MCNC: 86 MG/DL (ref 65–99)
HDLC SERPL-MCNC: 70 MG/DL (ref 40–60)
LDLC SERPL CALC-MCNC: 86 MG/DL (ref 0–100)
POTASSIUM SERPL-SCNC: 4 MMOL/L (ref 3.5–5.3)
PROT SERPL-MCNC: 7 G/DL (ref 6.4–8.2)
SODIUM SERPL-SCNC: 137 MMOL/L (ref 136–145)
TRIGL SERPL-MCNC: 63 MG/DL

## 2018-07-10 PROCEDURE — 80061 LIPID PANEL: CPT

## 2018-07-10 PROCEDURE — 87389 HIV-1 AG W/HIV-1&-2 AB AG IA: CPT

## 2018-07-10 PROCEDURE — 80053 COMPREHEN METABOLIC PANEL: CPT

## 2018-07-10 PROCEDURE — 36415 COLL VENOUS BLD VENIPUNCTURE: CPT

## 2018-07-11 LAB — HIV 1+2 AB+HIV1 P24 AG SERPL QL IA: NORMAL

## 2018-07-13 ENCOUNTER — HOSPITAL ENCOUNTER (OUTPATIENT)
Dept: NON INVASIVE DIAGNOSTICS | Facility: CLINIC | Age: 48
Discharge: HOME | End: 2018-07-13
Payer: COMMERCIAL

## 2018-07-13 DIAGNOSIS — Z13.6 SCREENING FOR CARDIOVASCULAR CONDITION: ICD-10-CM

## 2018-07-13 DIAGNOSIS — R94.39 ABNORMAL STRESS TEST: Primary | ICD-10-CM

## 2018-07-13 LAB
ARRHY DURING EX: NORMAL
CHEST PAIN STATEMENT: NORMAL
MAX DIASTOLIC BP: 82 MMHG
MAX HEART RATE: 176 BPM
MAX PREDICTED HEART RATE: 173 BPM
MAX. SYSTOLIC BP: 152 MMHG
PROTOCOL NAME: NORMAL
REASON FOR TERMINATION: NORMAL
TARGET HR FORMULA: NORMAL
TEST INDICATION: NORMAL
TIME IN EXERCISE PHASE: NORMAL

## 2018-07-13 PROCEDURE — 93017 CV STRESS TEST TRACING ONLY: CPT

## 2018-07-13 PROCEDURE — 93016 CV STRESS TEST SUPVJ ONLY: CPT | Performed by: INTERNAL MEDICINE

## 2018-07-13 PROCEDURE — 93018 CV STRESS TEST I&R ONLY: CPT | Performed by: INTERNAL MEDICINE

## 2018-07-19 ENCOUNTER — TELEPHONE (OUTPATIENT)
Dept: OBGYN CLINIC | Facility: CLINIC | Age: 48
End: 2018-07-19

## 2018-07-19 NOTE — TELEPHONE ENCOUNTER
Spoke with Wai Dc at [facility] [place] lab today  [employer] denied to cover genetic testing    Owen Norton spoke with patient on 6/22, and patient does not want to proceed with testing due to cost

## 2018-08-13 ENCOUNTER — HOSPITAL ENCOUNTER (OUTPATIENT)
Dept: NON INVASIVE DIAGNOSTICS | Facility: CLINIC | Age: 48
Discharge: HOME | End: 2018-08-13
Payer: COMMERCIAL

## 2018-08-13 DIAGNOSIS — R94.39 ABNORMAL STRESS TEST: ICD-10-CM

## 2018-08-13 PROCEDURE — 93350 STRESS TTE ONLY: CPT

## 2018-08-13 PROCEDURE — 93351 STRESS TTE COMPLETE: CPT | Performed by: INTERNAL MEDICINE

## 2018-08-14 LAB
ARRHY DURING EX: NORMAL
CHEST PAIN STATEMENT: NORMAL
MAX DIASTOLIC BP: 90 MMHG
MAX HEART RATE: 187 BPM
MAX PREDICTED HEART RATE: 173 BPM
MAX. SYSTOLIC BP: 150 MMHG
PROTOCOL NAME: NORMAL
REASON FOR TERMINATION: NORMAL
TARGET HR FORMULA: NORMAL
TEST INDICATION: NORMAL
TIME IN EXERCISE PHASE: NORMAL

## 2018-08-23 ENCOUNTER — OFFICE VISIT (OUTPATIENT)
Dept: GASTROENTEROLOGY | Facility: AMBULARY SURGERY CENTER | Age: 48
End: 2018-08-23
Payer: COMMERCIAL

## 2018-08-23 VITALS
DIASTOLIC BLOOD PRESSURE: 68 MMHG | HEART RATE: 73 BPM | SYSTOLIC BLOOD PRESSURE: 110 MMHG | HEIGHT: 62 IN | WEIGHT: 147 LBS | TEMPERATURE: 98.9 F | BODY MASS INDEX: 27.05 KG/M2

## 2018-08-23 DIAGNOSIS — Z12.11 SCREENING FOR MALIGNANT NEOPLASM OF COLON: ICD-10-CM

## 2018-08-23 DIAGNOSIS — Z80.0 FAMILY HISTORY OF COLON CANCER: Primary | ICD-10-CM

## 2018-08-23 PROCEDURE — 99203 OFFICE O/P NEW LOW 30 MIN: CPT | Performed by: INTERNAL MEDICINE

## 2018-08-23 NOTE — PROGRESS NOTES
Consultation - [facility]  Claudell Hoof Parkin 53 y o  female MRN: [id number]          Assessment & Plan:    Pleasant 41-year-old female here for evaluation for colonoscopy with a family history of colon cancer in a maternal aunt, colon polyps in her mother  1   High risk screening examination with family history:  -we will schedule the patient for colonoscopy  -discussed with her the risks of the procedure including bleeding, surgery, perforation  -if the patient colonoscopy is normal, would recommend 10 year interval screening examination the given that this was a second-degree relative      _____________________________________________________________        CC:  Evaluation for colonoscopy    HPI:  Austin Pete is a 53 y  o female who was referred for evaluation of colonoscopy  The patient as you know is a very pleasant and healthy 41-year-old female, no significant medical history, surgical history is notable for  x4  He denies any GI complaints, denies any nausea, vomiting, heartburn, dysphagia, diarrhea, constipation, melena, rectal bleeding, abdominal pain  Family history is notable for colon cancer in her maternal aunt at age of 54, notably her mother has also had colon polyps  The patient has 4 children, her oldest is starting college at [employer]  ROS:  The remainder of the ROS was negative except for the pertinent positives mentioned in HPI  Allergies: Patient has no known allergies  Medications:   Current Outpatient Prescriptions:     ibuprofen (MOTRIN) 200 mg tablet, Take 200 mg by mouth every 6 (six) hours as needed for mild pain, Disp: , Rfl: '    History reviewed  No pertinent past medical history      Past Surgical History:   Procedure Laterality Date    BIOPSY CORE NEEDLE Right 01/10/2017    BIOPSY BREAST PERCUTANTOUS NEEDLE CORE     SECTION      DESC: 3/2000,10/2001,10/2005,2007    TUBAL LIGATION      WISDOM TOOTH EXTRACTION         Family History   Problem Relation Age of Onset    Coronary artery disease Mother     Colon cancer Maternal Uncle         AGE 39'S    Prostate cancer Maternal Uncle         AGE 74'S    Colon cancer Maternal Aunt         reports that she has never smoked  She has never used smokeless tobacco  She reports that she drinks alcohol  She reports that she does not use drugs            Physical Exam:     /68 (BP Location: Left arm, Patient Position: Sitting, Cuff Size: Standard)   Pulse 73   Temp 98 9 °F (37 2 °C) (Tympanic)   Ht 5' 2" (1 575 m)   Wt 66 7 kg (147 lb)   BMI 26 89 kg/m²     Gen: wn/wd, NAD  HEENT: anicteric, MMM, no cervical LAD  CVS: RRR, no m/r/g  CHEST: CTA b/l  ABD: +BS, soft, NT,ND, no hepatosplenomegaly  EXT: no c/c/e  NEURO: aaox3  SKIN: NO rashes

## 2018-08-23 NOTE — LETTER
2018     Deborah Catsro  [address] [facility] [address]    Patient: Iraida Carmona   YOB: 1970   Date of Visit: 2018       Dear Dr Lencho Andujar: Thank you for referring German Powell to me for evaluation  Below are my notes for this consultation  If you have questions, please do not hesitate to call me  I look forward to following your patient along with you  Sincerely,        Libby Kent MD        CC: No Recipients  Libby Kent MD  2018  1:32 PM  Sign at close encounter  Consultation - [facility]  Leggett Juab 52 [facility] 53 y o  female MRN: [id number]          Assessment & Plan:    Pleasant 60-year-old female here for evaluation for colonoscopy with a family history of colon cancer in a maternal aunt, colon polyps in her mother  1   High risk screening examination with family history:  -we will schedule the patient for colonoscopy  -discussed with her the risks of the procedure including bleeding, surgery, perforation  -if the patient colonoscopy is normal, would recommend 10 year interval screening examination the given that this was a second-degree relative      _____________________________________________________________        CC:  Evaluation for colonoscopy    HPI:  Iraida Carmona is a 53 y  o female who was referred for evaluation of colonoscopy  The patient as you know is a very pleasant and healthy 60-year-old female, no significant medical history, surgical history is notable for  x4  He denies any GI complaints, denies any nausea, vomiting, heartburn, dysphagia, diarrhea, constipation, melena, rectal bleeding, abdominal pain  Family history is notable for colon cancer in her maternal aunt at age of 54, notably her mother has also had colon polyps  The patient has 4 children, her oldest is starting college at [employer]              ROS:  The remainder of the ROS was negative except for the pertinent positives mentioned in HPI  Allergies: Patient has no known allergies  Medications:   Current Outpatient Prescriptions:     ibuprofen (MOTRIN) 200 mg tablet, Take 200 mg by mouth every 6 (six) hours as needed for mild pain, Disp: , Rfl: '    History reviewed  No pertinent past medical history  Past Surgical History:   Procedure Laterality Date    BIOPSY CORE NEEDLE Right 01/10/2017    BIOPSY BREAST PERCUTANTOUS NEEDLE CORE     SECTION      DESC: 3/2000,10/2001,10/2005,2007    TUBAL LIGATION      WISDOM TOOTH EXTRACTION         Family History   Problem Relation Age of Onset    Coronary artery disease Mother     Colon cancer Maternal Uncle         AGE 40'S    Prostate cancer Maternal Uncle         AGE 74'S    Colon cancer Maternal Aunt         reports that she has never smoked  She has never used smokeless tobacco  She reports that she drinks alcohol  She reports that she does not use drugs            Physical Exam:     /68 (BP Location: Left arm, Patient Position: Sitting, Cuff Size: Standard)   Pulse 73   Temp 98 9 °F (37 2 °C) (Tympanic)   Ht 5' 2" (1 575 m)   Wt 66 7 kg (147 lb)   BMI 26 89 kg/m²      Gen: wn/wd, NAD  HEENT: anicteric, MMM, no cervical LAD  CVS: RRR, no m/r/g  CHEST: CTA b/l  ABD: +BS, soft, NT,ND, no hepatosplenomegaly  EXT: no c/c/e  NEURO: aaox3  SKIN: NO rashes

## 2018-09-01 ENCOUNTER — ANESTHESIA EVENT (OUTPATIENT)
Dept: PERIOP | Facility: AMBULARY SURGERY CENTER | Age: 48
End: 2018-09-01
Payer: COMMERCIAL

## 2018-09-12 ENCOUNTER — HOSPITAL ENCOUNTER (OUTPATIENT)
Facility: AMBULARY SURGERY CENTER | Age: 48
Setting detail: OUTPATIENT SURGERY
Discharge: HOME | End: 2018-09-12
Attending: INTERNAL MEDICINE | Admitting: INTERNAL MEDICINE
Payer: COMMERCIAL

## 2018-09-12 ENCOUNTER — ANESTHESIA (OUTPATIENT)
Dept: PERIOP | Facility: AMBULARY SURGERY CENTER | Age: 48
End: 2018-09-12
Payer: COMMERCIAL

## 2018-09-12 VITALS
DIASTOLIC BLOOD PRESSURE: 59 MMHG | BODY MASS INDEX: 25.95 KG/M2 | HEART RATE: 57 BPM | WEIGHT: 141 LBS | SYSTOLIC BLOOD PRESSURE: 112 MMHG | RESPIRATION RATE: 18 BRPM | OXYGEN SATURATION: 100 % | TEMPERATURE: 97.5 F | HEIGHT: 62 IN

## 2018-09-12 PROBLEM — Z12.11 SCREENING FOR MALIGNANT NEOPLASM OF COLON: Status: RESOLVED | Noted: 2018-08-23 | Resolved: 2018-09-12

## 2018-09-12 PROCEDURE — G0105 COLORECTAL SCRN; HI RISK IND: HCPCS | Performed by: INTERNAL MEDICINE

## 2018-09-12 RX ORDER — SODIUM CHLORIDE 9 MG/ML
100 INJECTION, SOLUTION INTRAVENOUS CONTINUOUS
Status: DISCONTINUED | OUTPATIENT
Start: 2018-09-12 | End: 2018-09-12 | Stop reason: HOSPADM

## 2018-09-12 RX ORDER — PROPOFOL 10 MG/ML
INJECTION, EMULSION INTRAVENOUS AS NEEDED
Status: DISCONTINUED | OUTPATIENT
Start: 2018-09-12 | End: 2018-09-12 | Stop reason: SURG

## 2018-09-12 RX ADMIN — PROPOFOL 30 MG: 10 INJECTION, EMULSION INTRAVENOUS at 12:25

## 2018-09-12 RX ADMIN — PROPOFOL 30 MG: 10 INJECTION, EMULSION INTRAVENOUS at 12:22

## 2018-09-12 RX ADMIN — SODIUM CHLORIDE 100 ML/HR: 0.9 INJECTION, SOLUTION INTRAVENOUS at 11:30

## 2018-09-12 RX ADMIN — LIDOCAINE HYDROCHLORIDE 50 MG: 20 INJECTION, SOLUTION INTRAVENOUS at 12:18

## 2018-09-12 RX ADMIN — PROPOFOL 170 MG: 10 INJECTION, EMULSION INTRAVENOUS at 12:18

## 2018-09-12 RX ADMIN — PROPOFOL 30 MG: 10 INJECTION, EMULSION INTRAVENOUS at 12:20

## 2018-09-12 RX ADMIN — PROPOFOL 30 MG: 10 INJECTION, EMULSION INTRAVENOUS at 12:28

## 2018-09-12 NOTE — DISCHARGE INSTRUCTIONS
Discharge home  Resume regular diet  Resume home medications  Repeat colonoscopy in 5 years due to family history of colon cancer  Call with any abdominal pain, bleeding, fevers

## 2018-09-12 NOTE — OP NOTE
Colonoscopy Procedure Note    Procedure: Colonoscopy    Sedation: Monitored anesthesia care, check anesthesia records      ASA Class: 1    INDICATIONS:   Screening colonoscopy, family history of colon cancer and colon polyps    POST-OP DIAGNOSIS: See the impression below    Procedure Details     Prior colonoscopy: No prior colonoscopy  Informed consent was obtained for the procedure, including sedation  Risks of perforation, hemorrhage, adverse drug reaction and aspiration were discussed  The patient was placed in the left lateral decubitus position  Based on the pre-procedure assessment, including review of the patient's medical history, medications, allergies, and review of systems, she had been deemed to be an appropriate candidate for conscious sedation; she was therefore sedated with the medications listed below  The patient was monitored continuously with telemetry, pulse oximetry, blood pressure monitoring, and direct observations  A rectal examination was performed  The colonoscope was inserted into the rectum and advanced under direct vision to the cecum, which was identified by the ileocecal valve and appendiceal orifice  The quality of the colonic preparation was good  A careful inspection was made as the colonoscope was withdrawn, including a retroflexed view of the rectum; findings and interventions are described below  Findings:    Normal colonoscopy with good prep good visualization  Very mild internal hemorrhoids on retroflexion           Complications: None; patient tolerated the procedure well  Impression:    Normal colonoscopy with good prep good visualization except for mild internal hemorrhoids    Recommendations:  Repeat colonoscopy in 5 years or sooner if clinically indicated  Repeat colonoscopy is being recommended at an interval of less than 10 years, this is because of a personal history of polyps or colon cancer      Discharge home  Resume regular diet  Resume home medications  Repeat colonoscopy in 5 years due to family history of colon cancer  Call with any abdominal pain, bleeding, fevers

## 2018-09-12 NOTE — H&P
History and Physical - [facility]  Brant Berry Garcia 53 y o  female MRN: [id number]    HPI: Osman Walsh is a 53y o  year old female who presents with family hx of colon cancer and polyps, here for screening  Review of Systems    Historical Information   History reviewed  No pertinent past medical history  Past Surgical History:   Procedure Laterality Date    BIOPSY CORE NEEDLE Right 01/10/2017    BIOPSY BREAST PERCUTANTOUS NEEDLE CORE     SECTION      DESC: 3/2000,10/2001,10/2005,2007    TUBAL LIGATION      WISDOM TOOTH EXTRACTION       Social History   History   Alcohol Use    Yes     Comment: 2-3 per month     History   Drug Use No     History   Smoking Status    Never Smoker   Smokeless Tobacco    Never Used     Family History   Problem Relation Age of Onset    Coronary artery disease Mother     Colon cancer Maternal Uncle         AGE 40'S    Prostate cancer Maternal Uncle         AGE 70'S    Colon cancer Maternal Aunt        Meds/Allergies     Prescriptions Prior to Admission   Medication    ibuprofen (MOTRIN) 200 mg tablet       No Known Allergies    Objective     Blood pressure 128/60, pulse 61, temperature (!) 97 °F (36 1 °C), temperature source Temporal, resp  rate 18, height 5' 2" (1 575 m), weight 64 kg (141 lb), SpO2 100 %  PHYSICAL EXAM    Gen: NAD  CV: RRR  CHEST: Clear  ABD: soft, NT/ND  EXT: no edema  Neuro: AAO      ASSESSMENT/PLAN:  This is a 53y o  year old female here for family hx of colon cancer and polyps, here for screening       PLAN:   Procedure: colonoscopy

## 2018-09-12 NOTE — ANESTHESIA PREPROCEDURE EVALUATION
Review of Systems/Medical History  Patient summary reviewed  Chart reviewed  No history of anesthetic complications     Cardiovascular  Negative cardio ROS Exercise tolerance (METS): >4,     Pulmonary  Negative pulmonary ROS        GI/Hepatic    Bowel prep       Negative  ROS        Endo/Other  Negative endo/other ROS      GYN  Negative gynecology ROS Not currently pregnant ,          Hematology  Negative hematology ROS      Musculoskeletal  Negative musculoskeletal ROS        Neurology  Negative neurology ROS      Psychology   Negative psychology ROS              Physical Exam    Airway    Mallampati score: I  TM Distance: >3 FB  Neck ROM: full     Dental   No notable dental hx     Cardiovascular  Comment: Negative ROS,     Pulmonary      Other Findings        Anesthesia Plan  ASA Score- 1     Anesthesia Type- IV sedation with anesthesia with ASA Monitors  Additional Monitors:   Airway Plan:         Plan Factors-    Induction- intravenous  Postoperative Plan-     Informed Consent- Anesthetic plan and risks discussed with patient and spouse  I personally reviewed this patient with the CRNA  Discussed and agreed on the Anesthesia Plan with the CRNA  Chriss Chilel

## 2018-09-12 NOTE — ANESTHESIA POSTPROCEDURE EVALUATION
Post-Op Assessment Note      CV Status:  Stable    Mental Status:  Alert and awake    Hydration Status:  Euvolemic    PONV Controlled:  Controlled    Airway Patency:  Patent and adequate    Post Op Vitals Reviewed: Yes          Staff: CRNA           BP   107/60   Temp     Pulse  71   Resp      SpO2   95%

## 2018-11-09 ENCOUNTER — OFFICE VISIT (OUTPATIENT)
Dept: INTERNAL MEDICINE CLINIC | Facility: CLINIC | Age: 48
End: 2018-11-09
Payer: COMMERCIAL

## 2018-11-09 VITALS
HEART RATE: 78 BPM | RESPIRATION RATE: 16 BRPM | SYSTOLIC BLOOD PRESSURE: 114 MMHG | WEIGHT: 147.8 LBS | TEMPERATURE: 98.2 F | DIASTOLIC BLOOD PRESSURE: 64 MMHG | OXYGEN SATURATION: 99 % | BODY MASS INDEX: 27.03 KG/M2

## 2018-11-09 DIAGNOSIS — Z23 NEED FOR INFLUENZA VACCINATION: ICD-10-CM

## 2018-11-09 DIAGNOSIS — N39.0 ACUTE UTI: Primary | ICD-10-CM

## 2018-11-09 LAB
BACTERIA UR QL AUTO: ABNORMAL /HPF
BILIRUB UR QL STRIP: NEGATIVE
CLARITY UR: CLEAR
COLOR UR: ABNORMAL
GLUCOSE UR STRIP-MCNC: NEGATIVE MG/DL
HGB UR QL STRIP.AUTO: NEGATIVE
HYALINE CASTS #/AREA URNS LPF: ABNORMAL /LPF
KETONES UR STRIP-MCNC: NEGATIVE MG/DL
LEUKOCYTE ESTERASE UR QL STRIP: ABNORMAL
NITRITE UR QL STRIP: NEGATIVE
NON-SQ EPI CELLS URNS QL MICRO: ABNORMAL /HPF
PH UR STRIP.AUTO: 5.5 [PH] (ref 4.5–8)
PROT UR STRIP-MCNC: NEGATIVE MG/DL
RBC #/AREA URNS AUTO: ABNORMAL /HPF
SP GR UR STRIP.AUTO: 1.03 (ref 1–1.03)
UROBILINOGEN UR QL STRIP.AUTO: 0.2 E.U./DL
WBC #/AREA URNS AUTO: ABNORMAL /HPF

## 2018-11-09 PROCEDURE — 87186 SC STD MICRODIL/AGAR DIL: CPT | Performed by: NURSE PRACTITIONER

## 2018-11-09 PROCEDURE — 87086 URINE CULTURE/COLONY COUNT: CPT | Performed by: NURSE PRACTITIONER

## 2018-11-09 PROCEDURE — 87077 CULTURE AEROBIC IDENTIFY: CPT | Performed by: NURSE PRACTITIONER

## 2018-11-09 PROCEDURE — 81002 URINALYSIS NONAUTO W/O SCOPE: CPT | Performed by: NURSE PRACTITIONER

## 2018-11-09 PROCEDURE — 99213 OFFICE O/P EST LOW 20 MIN: CPT | Performed by: NURSE PRACTITIONER

## 2018-11-09 PROCEDURE — 81001 URINALYSIS AUTO W/SCOPE: CPT | Performed by: NURSE PRACTITIONER

## 2018-11-09 PROCEDURE — 90682 RIV4 VACC RECOMBINANT DNA IM: CPT

## 2018-11-09 PROCEDURE — 90471 IMMUNIZATION ADMIN: CPT

## 2018-11-09 RX ORDER — NITROFURANTOIN 25; 75 MG/1; MG/1
100 CAPSULE ORAL 2 TIMES DAILY
Qty: 10 CAPSULE | Refills: 0 | Status: SHIPPED | OUTPATIENT
Start: 2018-11-09 | End: 2018-11-14

## 2018-11-09 NOTE — PROGRESS NOTES
Assessment/Plan:    Drink plenty of fluids  Take antibiotic as prescribed  Send out urine culture  Diagnoses and all orders for this visit:    Acute UTI  -     nitrofurantoin (MACROBID) 100 mg capsule; Take 1 capsule (100 mg total) by mouth 2 (two) times a day for 5 days  -     POCT urine dip  -     Cancel: UA w Reflex to Microscopic w Reflex to Culture -Lab Collect  -     UA w Reflex to Microscopic w Reflex to Culture -Lab Collect  -     Urine Microscopic  -     Urine culture; Future  -     Urine culture    Need for influenza vaccination  -     influenza vaccine, [id number], quadrivalent, recombinant, PF, 0 5 mL, for patients 18 yr+ (FLUBLOK)          Subjective:      Patient ID: Reyes Billings is a 53 y o  female  Here for UTI symptoms   Started a month ago  Lasted a few days then got better, now symptoms returned  +burning, urgency, cloudy foul smelling urine   Denies any fevers, abdominal pressure, or frequency           The following portions of the patient's history were reviewed and updated as appropriate: allergies, current medications, past family history, past medical history, past social history, past surgical history and problem list     Review of Systems   Constitutional: Negative  Respiratory: Negative  Cardiovascular: Negative  Gastrointestinal: Negative  Genitourinary: Positive for dysuria and urgency  Negative for decreased urine volume, difficulty urinating, flank pain, frequency, hematuria, pelvic pain and vaginal discharge  Objective:      /64   Pulse 78   Temp 98 2 °F (36 8 °C) (Tympanic)   Resp 16   Wt 67 kg (147 lb 12 8 oz)   SpO2 99%   BMI 27 03 kg/m²          Physical Exam   Constitutional: She is oriented to person, place, and time  She appears well-developed and well-nourished  Cardiovascular: Normal rate, regular rhythm and normal heart sounds  Pulmonary/Chest: Effort normal and breath sounds normal    Abdominal: Soft   Bowel sounds are normal  There is no tenderness  Neurological: She is alert and oriented to person, place, and time  Psychiatric: She has a normal mood and affect  Her behavior is normal    Vitals reviewed

## 2018-11-12 DIAGNOSIS — B96.89 UTI DUE TO KLEBSIELLA SPECIES: Primary | ICD-10-CM

## 2018-11-12 DIAGNOSIS — N39.0 UTI DUE TO KLEBSIELLA SPECIES: Primary | ICD-10-CM

## 2018-11-12 LAB
BACTERIA UR CULT: ABNORMAL
BACTERIA UR CULT: ABNORMAL

## 2018-11-12 RX ORDER — CIPROFLOXACIN 250 MG/1
250 TABLET, FILM COATED ORAL EVERY 12 HOURS SCHEDULED
Qty: 6 TABLET | Refills: 0 | Status: SHIPPED | OUTPATIENT
Start: 2018-11-12 | End: 2018-11-15

## 2018-11-13 LAB
SL AMB  POCT GLUCOSE, UA: ABNORMAL
SL AMB LEUKOCYTE ESTERASE,UA: 1
SL AMB POCT BILIRUBIN,UA: ABNORMAL
SL AMB POCT BLOOD,UA: ABNORMAL
SL AMB POCT CLARITY,UA: ABNORMAL
SL AMB POCT COLOR,UA: YELLOW
SL AMB POCT KETONES,UA: ABNORMAL
SL AMB POCT NITRITE,UA: ABNORMAL
SL AMB POCT PH,UA: 5.5
SL AMB POCT SPECIFIC GRAVITY,UA: 1.03
SL AMB POCT URINE PROTEIN: ABNORMAL
SL AMB POCT UROBILINOGEN: ABNORMAL

## 2018-12-17 ENCOUNTER — ANNUAL EXAM (OUTPATIENT)
Dept: OBGYN CLINIC | Facility: CLINIC | Age: 48
End: 2018-12-17
Payer: COMMERCIAL

## 2018-12-17 VITALS — SYSTOLIC BLOOD PRESSURE: 122 MMHG | DIASTOLIC BLOOD PRESSURE: 72 MMHG | WEIGHT: 149.2 LBS | BODY MASS INDEX: 27.29 KG/M2

## 2018-12-17 DIAGNOSIS — Z01.419 PAP SMEAR, AS PART OF ROUTINE GYNECOLOGICAL EXAMINATION: Primary | ICD-10-CM

## 2018-12-17 DIAGNOSIS — Z01.419 ENCOUNTER FOR GYNECOLOGICAL EXAMINATION: ICD-10-CM

## 2018-12-17 DIAGNOSIS — Z12.39 BREAST CANCER SCREENING: ICD-10-CM

## 2018-12-17 PROCEDURE — 99396 PREV VISIT EST AGE 40-64: CPT | Performed by: OBSTETRICS & GYNECOLOGY

## 2018-12-17 NOTE — PROGRESS NOTES
Assessment/Plan:    No problem-specific Assessment & Plan notes found for this encounter  Normal gynecological physical examination  Self-breast examination stressed  Mammogram ordered  Discussed regular exercise, healthy diet, importance of vitamin D and calcium supplements  Discussed importance of sun block use during periods of prolonged sun exposure  Patient will be seen in 1 year for routine gynecologic and medical examination  Patient will call office for any problems, concerns, or issues which may arise during the interim  Diagnoses and all orders for this visit:    Pap smear, as part of routine gynecological examination  -     Thinprep Pap (Refl) HPV mRNA E6/E7    Breast cancer screening  -     Mammo screening bilateral w cad; Future    Encounter for gynecological examination        Subjective:      Patient ID: Madhu Pelayo is a 53 y o  female  Patient is a 16-year-old female who presents today for her annual gynecologic and medical examination    Patient reports that her periods still are fairly normal and on time    Occasionally patient states that her period comes a little bit closer or little bit later but that there is no retic bleeding in between    She also denies any significant dysmenorrhea    Patient reports normal bowel and bladder habits    Patient has also had her 1st colonoscopy screening    She denies any significant pelvic or abdominal pain    Patient is not being followed for any medical problems at this time    Patient does regular self-breast examinations and is current with mammography screening            The following portions of the patient's history were reviewed and updated as appropriate: allergies, current medications, past family history, past medical history, past social history, past surgical history and problem list     Review of Systems   Constitutional: Negative  HENT: Negative  Eyes: Negative  Respiratory: Negative  Cardiovascular: Negative  Gastrointestinal: Negative  Endocrine: Negative  Genitourinary: Negative  Musculoskeletal: Negative  Skin: Negative  Neurological: Negative  Psychiatric/Behavioral: Negative  Objective:      /72   Wt 67 7 kg (149 lb 3 2 oz)   LMP 11/17/2018   BMI 27 29 kg/m²          Physical Exam   Constitutional: She appears well-developed  HENT:   Head: Normocephalic  Eyes: Pupils are equal, round, and reactive to light  Neck: Normal range of motion  Neck supple  Cardiovascular: Normal rate and regular rhythm  Pulmonary/Chest: Effort normal and breath sounds normal  Right breast exhibits no inverted nipple, no mass, no nipple discharge, no skin change and no tenderness  Left breast exhibits no inverted nipple, no mass, no nipple discharge, no skin change and no tenderness  Breasts are symmetrical    Abdominal: Soft  Normal appearance and bowel sounds are normal    Genitourinary: Rectum normal, vagina normal and uterus normal  Rectal exam shows guaiac negative stool  Pelvic exam was performed with patient supine  Right adnexum displays no mass, no tenderness and no fullness  Left adnexum displays no mass, no tenderness and no fullness  No vaginal discharge found  Lymphadenopathy:     She has no cervical adenopathy  She has no axillary adenopathy  Right: No inguinal and no supraclavicular adenopathy present  Left: No inguinal and no supraclavicular adenopathy present  Neurological: She is alert  Skin: Skin is intact  No rash noted  Psychiatric: She has a normal mood and affect   Her speech is normal and behavior is normal  Judgment and thought content normal  Cognition and memory are normal

## 2018-12-20 LAB
CLINICAL INFO: NORMAL
CYTO CVX: NORMAL
DATE PREVIOUS BX: NORMAL
LMP START DATE: NORMAL
SL AMB PREV. PAP:: NORMAL
SPECIMEN SOURCE CVX/VAG CYTO: NORMAL

## 2019-01-19 ENCOUNTER — HOSPITAL ENCOUNTER (OUTPATIENT)
Dept: RADIOLOGY | Age: 49
Discharge: HOME | End: 2019-01-19
Payer: COMMERCIAL

## 2019-01-19 VITALS — HEIGHT: 62 IN | BODY MASS INDEX: 25.76 KG/M2 | WEIGHT: 140 LBS

## 2019-01-19 DIAGNOSIS — Z12.39 BREAST CANCER SCREENING: ICD-10-CM

## 2019-01-19 PROCEDURE — 77067 SCR MAMMO BI INCL CAD: CPT

## 2019-07-08 ENCOUNTER — OFFICE VISIT (OUTPATIENT)
Dept: INTERNAL MEDICINE CLINIC | Facility: CLINIC | Age: 49
End: 2019-07-08
Payer: COMMERCIAL

## 2019-07-08 VITALS
SYSTOLIC BLOOD PRESSURE: 114 MMHG | HEART RATE: 69 BPM | DIASTOLIC BLOOD PRESSURE: 78 MMHG | RESPIRATION RATE: 16 BRPM | BODY MASS INDEX: 26.98 KG/M2 | OXYGEN SATURATION: 98 % | HEIGHT: 62 IN | WEIGHT: 146.6 LBS

## 2019-07-08 DIAGNOSIS — H53.9 VISION DISTURBANCE: ICD-10-CM

## 2019-07-08 DIAGNOSIS — R60.9 EDEMA, UNSPECIFIED TYPE: ICD-10-CM

## 2019-07-08 DIAGNOSIS — Z00.00 WELLNESS EXAMINATION: Primary | ICD-10-CM

## 2019-07-08 DIAGNOSIS — Z13.6 SCREENING FOR CARDIOVASCULAR CONDITION: ICD-10-CM

## 2019-07-08 PROCEDURE — 99396 PREV VISIT EST AGE 40-64: CPT | Performed by: INTERNAL MEDICINE

## 2019-07-08 NOTE — ASSESSMENT & PLAN NOTE
Dependent edema improved it occurred initially 1 month ago after a prolonged car trip she only has trace edema on examination reduce sodium, leg elevation will check venous Doppler and laboratories she is to monitor if the symptoms persist or do not improve recur to notify me immediately no signs or symptoms of CHF

## 2019-07-08 NOTE — ASSESSMENT & PLAN NOTE
Assessment and plan 1  Health maintenance annual wellness examination overall the patient is clinically stable and doing well, we encouraged the patient to follow a healthy and balanced diet  We recommend that the patient exercise routinely approximately 30 minutes 5 times per week   We have reviewed the patient's vaccines and have made recommendations for updates if necessary  recommend annual flu vaccine, up-to-date on screening colonoscopy because of the family history I will be ordering routine laboratories      We will be ordering screening laboratories which are age appropriate  Return to the office in   1 year   call if any problems

## 2019-07-08 NOTE — PROGRESS NOTES
NAME: Claudell Hoof Parkin  AGE: 51 y o  SEX: female  : 1970     DATE: 2019    Assessment and Plan     Problem List Items Addressed This Visit        Other    Wellness examination - Primary     Assessment and plan 1  Health maintenance annual wellness examination overall the patient is clinically stable and doing well, we encouraged the patient to follow a healthy and balanced diet  We recommend that the patient exercise routinely approximately 30 minutes 5 times per week   We have reviewed the patient's vaccines and have made recommendations for updates if necessary  recommend annual flu vaccine, up-to-date on screening colonoscopy because of the family history I will be ordering routine laboratories      We will be ordering screening laboratories which are age appropriate  Return to the office in   1 year   call if any problems  Relevant Orders    Comprehensive metabolic panel    Lipid Panel with Direct LDL reflex    Vision disturbance    Relevant Orders    Comprehensive metabolic panel    Lipid Panel with Direct LDL reflex    Ambulatory referral to Ophthalmology    Screening for cardiovascular condition    Relevant Orders    Comprehensive metabolic panel    Lipid Panel with Direct LDL reflex    Edema     Dependent edema improved it occurred initially 1 month ago after a prolonged car trip she only has trace edema on examination reduce sodium, leg elevation will check venous Doppler and laboratories she is to monitor if the symptoms persist or do not improve recur to notify me immediately no signs or symptoms of CHF           Relevant Orders    TSH, 3rd generation    NT-BNP PRO    VAS lower limb venous duplex study, complete bilateral    UA w Reflex to Microscopic w Reflex to Culture -Lab Collect            · Patient Counseling:   · Nutrition: Stressed importance of a well balanced diet, moderation of sodium/saturated fat, caloric balance and sufficient intake of fiber  · Exercise: Stressed the importance of regular exercise with a goal of 150 minutes per week-very active 10,000 steps per day  · Dental Health: Discussed daily flossing and brushing and regular dental visits     · Immunizations reviewed flu vaccine in the fall  · Discussed benefits of screening yes  No follow-ups on file  Chief Complaint     Chief Complaint   Patient presents with    Annual Exam       History of Present Illness     HPI she reports on the way [place] from [place] from [place] the ankles swollen , 8 hours sitting  Felt extra water, moved around and got better, one month ago and didn't come back symmetrical ,    Well Adult Physical   Patient here for a comprehensive physical exam       Diet and Physical Activity  Diet: well balanced diet  Weight concerns: None, patient's BMI is between 18 5-24 9  Exercise: daily      Depression Screen  PHQ-9 Depression Screening    PHQ-9:    Frequency of the following problems over the past two weeks:       Little interest or pleasure in doing things:  0 - not at all  Feeling down, depressed, or hopeless:  0 - not at all  PHQ-2 Score:  0          General Health  Hearing: Normal:  bilateral  Vision: film over the left eye  Dental: regular dental visits    Reproductive Health        The following portions of the patient's history were reviewed and updated as appropriate: allergies, current medications, past family history, past medical history, past social history, past surgical history and problem list     Review of Systems     Review of Systems   Constitutional: Negative for activity change, appetite change and unexpected weight change  HENT: Negative for congestion and postnasal drip  Eyes: Negative for visual disturbance  Respiratory: Negative for cough and shortness of breath  Cardiovascular: Positive for leg swelling (Improving occurred a month ago)  Negative for chest pain  Gastrointestinal: Negative for abdominal pain, diarrhea, nausea and vomiting  Neurological: Negative for dizziness, light-headedness and headaches  Hematological: Negative for adenopathy  Past Medical History     History reviewed  No pertinent past medical history  Past Surgical History     Past Surgical History:   Procedure Laterality Date    BIOPSY CORE NEEDLE Right 01/10/2017    BIOPSY BREAST PERCUTANTOUS NEEDLE CORE     SECTION      DESC: 3/2000,10/2001,10/2005,2007    MO COLONOSCOPY FLX DX W/COLLJ SPEC WHEN PFRMD N/A 2018    Procedure: COLONOSCOPY;  Surgeon: Radha Marti MD;  [facility];   Service: Gastroenterology    TUBAL LIGATION      US GUIDED BREAST BIOPSY RIGHT COMPLETE Right 1/10/2017    WISDOM TOOTH EXTRACTION         Social History     Social History     Socioeconomic History    Marital status: /Civil Union     Spouse name: None    Number of children: None    Years of education: 2ND YEAR COLLEGE    Highest education level: None   Occupational History    Occupation: HOMEMAKER   Social Needs    Financial resource strain: None    Food insecurity:     Worry: None     Inability: None    Transportation needs:     Medical: None     Non-medical: None   Tobacco Use    Smoking status: Never Smoker    Smokeless tobacco: Never Used   Substance and Sexual Activity    Alcohol use: Yes     Comment: 2-3 per month    Drug use: No    Sexual activity: None     Comment: USES BIRTH CONTROL   Lifestyle    Physical activity:     Days per week: None     Minutes per session: None    Stress: None   Relationships    Social connections:     Talks on phone: None     Gets together: None     Attends [employer] service: None     Active member of club or organization: None     Attends meetings of clubs or organizations: None     Relationship status: None    Intimate partner violence:     Fear of current or ex partner: None     Emotionally abused: None     Physically abused: None     Forced sexual activity: None   Other Topics Concern    None Social History Narrative    DAILY COFFEE CONSUMPTION 2 CUPS A DAY    DENIED---- HOME ENVIRONMENT DOMESTIC VIOLENCE    LIVING INDEPENDENTLY WITH SPOUSE       Family History     Family History   Problem Relation Age of Onset    Coronary artery disease Mother     Colon cancer Maternal Uncle         AGE 39'S    Prostate cancer Maternal Uncle         AGE 74'S    Colon cancer Maternal Aunt        Current Medications     No current outpatient medications on file  Allergies     No Known Allergies    Objective     /78   Pulse 69   Resp 16   Ht 5' 2" (1 575 m)   Wt 66 5 kg (146 lb 9 6 oz)   SpO2 98%   BMI 26 81 kg/m²      Physical Exam   Constitutional: She appears well-developed and well-nourished  HENT:   Head: Normocephalic  Mouth/Throat: Oropharynx is clear and moist    Eyes: Pupils are equal, round, and reactive to light  Conjunctivae are normal  Right eye exhibits no discharge  Left eye exhibits no discharge  No scleral icterus  Neck: Neck supple  Cardiovascular: Normal rate, regular rhythm, normal heart sounds and intact distal pulses  Exam reveals no gallop and no friction rub  No murmur heard  Pulmonary/Chest: Breath sounds normal  No respiratory distress  She has no wheezes  She has no rales  Abdominal: Soft  Bowel sounds are normal  She exhibits no distension and no mass  There is no tenderness  There is no rebound and no guarding  Musculoskeletal: She exhibits no edema (Trace edema bilateral lower extremity) or deformity  Lymphadenopathy:     She has no cervical adenopathy  Neurological: She is alert   Coordination normal          No exam data present    Health Maintenance     Health Maintenance   Topic Date Due    Pneumococcal Vaccine: Pediatrics (0 to 5 Years) and At-Risk Patients (6 to 59 Years) (1 of 3 - PCV13) 06/15/1977    BMI: Followup Plan  06/15/1989    INFLUENZA VACCINE  07/01/2019    MAMMOGRAM  01/19/2020    Depression Screening PHQ  07/08/2020    BMI: Adult 07/08/2020    PAP SMEAR  12/17/2023    DTaP,Tdap,and Td Vaccines (2 - Td) 07/14/2024    Pneumococcal Vaccine: 65+ Years (1 of 2 - PCV13) 06/15/2036    HEPATITIS B VACCINES  Aged Out     Immunization History   Administered Date(s) Administered    Influenza Quadrivalent Preservative Free 3 years and older IM 10/04/2014, 10/07/2015, 10/07/2017    Influenza TIV (IM) 01/24/2014    Influenza, recombinant, quadrivalent,injectable, preservative free 11/09/2018    Td (adult), adsorbed 1970    Tdap 07/14/2014       Edd Cooley, DO  [facility]

## 2019-07-15 ENCOUNTER — APPOINTMENT (OUTPATIENT)
Dept: LAB | Facility: CLINIC | Age: 49
End: 2019-07-15
Payer: COMMERCIAL

## 2019-07-15 ENCOUNTER — TRANSCRIBE ORDERS (OUTPATIENT)
Dept: LAB | Facility: CLINIC | Age: 49
End: 2019-07-15

## 2019-07-15 DIAGNOSIS — Z00.00 WELLNESS EXAMINATION: ICD-10-CM

## 2019-07-15 DIAGNOSIS — Z13.6 SCREENING FOR CARDIOVASCULAR CONDITION: ICD-10-CM

## 2019-07-15 DIAGNOSIS — R60.9 EDEMA, UNSPECIFIED TYPE: ICD-10-CM

## 2019-07-15 DIAGNOSIS — R79.89 ELEVATED TSH: ICD-10-CM

## 2019-07-15 DIAGNOSIS — H53.9 VISION DISTURBANCE: ICD-10-CM

## 2019-07-15 DIAGNOSIS — R79.89 ELEVATED BRAIN NATRIURETIC PEPTIDE (BNP) LEVEL: Primary | ICD-10-CM

## 2019-07-15 LAB
ALBUMIN SERPL BCP-MCNC: 3.7 G/DL (ref 3.5–5)
ALP SERPL-CCNC: 51 U/L (ref 46–116)
ALT SERPL W P-5'-P-CCNC: 17 U/L (ref 12–78)
ANION GAP SERPL CALCULATED.3IONS-SCNC: 6 MMOL/L (ref 4–13)
AST SERPL W P-5'-P-CCNC: 15 U/L (ref 5–45)
BILIRUB SERPL-MCNC: 0.6 MG/DL (ref 0.2–1)
BILIRUB UR QL STRIP: NEGATIVE
BUN SERPL-MCNC: 15 MG/DL (ref 5–25)
CALCIUM SERPL-MCNC: 8.3 MG/DL (ref 8.3–10.1)
CHLORIDE SERPL-SCNC: 102 MMOL/L (ref 100–108)
CHOLEST SERPL-MCNC: 150 MG/DL (ref 50–200)
CLARITY UR: CLEAR
CO2 SERPL-SCNC: 29 MMOL/L (ref 21–32)
COLOR UR: YELLOW
CREAT SERPL-MCNC: 0.74 MG/DL (ref 0.6–1.3)
GFR SERPL CREATININE-BSD FRML MDRD: 96 ML/MIN/1.73SQ M
GLUCOSE P FAST SERPL-MCNC: 88 MG/DL (ref 65–99)
GLUCOSE UR STRIP-MCNC: NEGATIVE MG/DL
HDLC SERPL-MCNC: 63 MG/DL (ref 40–60)
HGB UR QL STRIP.AUTO: NEGATIVE
KETONES UR STRIP-MCNC: NEGATIVE MG/DL
LDLC SERPL CALC-MCNC: 75 MG/DL (ref 0–100)
LEUKOCYTE ESTERASE UR QL STRIP: NEGATIVE
NITRITE UR QL STRIP: NEGATIVE
NT-PROBNP SERPL-MCNC: 195 PG/ML
PH UR STRIP.AUTO: 5.5 [PH]
POTASSIUM SERPL-SCNC: 3.7 MMOL/L (ref 3.5–5.3)
PROT SERPL-MCNC: 6.9 G/DL (ref 6.4–8.2)
PROT UR STRIP-MCNC: NEGATIVE MG/DL
SODIUM SERPL-SCNC: 137 MMOL/L (ref 136–145)
SP GR UR STRIP.AUTO: 1.02 (ref 1–1.03)
TRIGL SERPL-MCNC: 62 MG/DL
TSH SERPL DL<=0.05 MIU/L-ACNC: 6.05 UIU/ML (ref 0.36–3.74)
UROBILINOGEN UR QL STRIP.AUTO: 0.2 E.U./DL

## 2019-07-15 PROCEDURE — 83880 ASSAY OF NATRIURETIC PEPTIDE: CPT

## 2019-07-15 PROCEDURE — 81003 URINALYSIS AUTO W/O SCOPE: CPT | Performed by: NURSE PRACTITIONER

## 2019-07-15 PROCEDURE — 84443 ASSAY THYROID STIM HORMONE: CPT

## 2019-07-15 PROCEDURE — 80061 LIPID PANEL: CPT

## 2019-07-15 PROCEDURE — 80053 COMPREHEN METABOLIC PANEL: CPT

## 2019-07-15 PROCEDURE — 36415 COLL VENOUS BLD VENIPUNCTURE: CPT

## 2019-07-22 ENCOUNTER — APPOINTMENT (OUTPATIENT)
Dept: LAB | Facility: CLINIC | Age: 49
End: 2019-07-22
Payer: COMMERCIAL

## 2019-07-22 DIAGNOSIS — R79.89 ELEVATED BRAIN NATRIURETIC PEPTIDE (BNP) LEVEL: Primary | ICD-10-CM

## 2019-07-22 DIAGNOSIS — R79.89 ELEVATED TSH: ICD-10-CM

## 2019-07-22 LAB
NT-PROBNP SERPL-MCNC: 196 PG/ML
TSH SERPL DL<=0.05 MIU/L-ACNC: 5.13 UIU/ML (ref 0.36–3.74)

## 2019-07-22 PROCEDURE — 84443 ASSAY THYROID STIM HORMONE: CPT

## 2019-07-22 PROCEDURE — 83880 ASSAY OF NATRIURETIC PEPTIDE: CPT

## 2019-07-22 PROCEDURE — 36415 COLL VENOUS BLD VENIPUNCTURE: CPT

## 2019-07-24 ENCOUNTER — OFFICE VISIT (OUTPATIENT)
Dept: INTERNAL MEDICINE CLINIC | Facility: CLINIC | Age: 49
End: 2019-07-24
Payer: COMMERCIAL

## 2019-07-24 VITALS
RESPIRATION RATE: 14 BRPM | WEIGHT: 146.8 LBS | OXYGEN SATURATION: 98 % | DIASTOLIC BLOOD PRESSURE: 70 MMHG | HEART RATE: 60 BPM | BODY MASS INDEX: 26.01 KG/M2 | SYSTOLIC BLOOD PRESSURE: 106 MMHG | TEMPERATURE: 98.8 F | HEIGHT: 63 IN

## 2019-07-24 DIAGNOSIS — E03.9 HYPOTHYROIDISM, UNSPECIFIED TYPE: Primary | ICD-10-CM

## 2019-07-24 DIAGNOSIS — R79.89 ELEVATED BRAIN NATRIURETIC PEPTIDE (BNP) LEVEL: ICD-10-CM

## 2019-07-24 DIAGNOSIS — N95.1 PERIMENOPAUSAL SYMPTOM: ICD-10-CM

## 2019-07-24 PROCEDURE — 99214 OFFICE O/P EST MOD 30 MIN: CPT | Performed by: INTERNAL MEDICINE

## 2019-07-24 PROCEDURE — 3008F BODY MASS INDEX DOCD: CPT | Performed by: INTERNAL MEDICINE

## 2019-07-24 PROCEDURE — 1036F TOBACCO NON-USER: CPT | Performed by: INTERNAL MEDICINE

## 2019-07-24 RX ORDER — LEVOTHYROXINE SODIUM 0.03 MG/1
25 TABLET ORAL DAILY
Qty: 30 TABLET | Refills: 3 | Status: SHIPPED | OUTPATIENT
Start: 2019-07-24 | End: 2019-11-11 | Stop reason: SDUPTHER

## 2019-07-24 NOTE — ASSESSMENT & PLAN NOTE
The patient reports me difficulty losing weight, fatigue and because of this will start her on levothyroxine 25 mcg once daily recheck 3rd generation TSH in 4 weeks along with anti TPO antibody I have explained to patient proper way of taking levothyroxine

## 2019-07-24 NOTE — ASSESSMENT & PLAN NOTE
Asymptomatic I will check echocardiogram and have the patient see Cardiology she does have some mild swelling in her ankles but no shortness of breath

## 2019-07-24 NOTE — PROGRESS NOTES
BMI Counseling: Body mass index is 26 42 kg/m²  Discussed the patient's BMI with her  The BMI is above average  BMI counseling and education was provided to the patient  Nutrition recommendations include reducing portion sizes  Assessment/Plan:    Hypothyroidism  The patient reports me difficulty losing weight, fatigue and because of this will start her on levothyroxine 25 mcg once daily recheck 3rd generation TSH in 4 weeks along with anti TPO antibody I have explained to patient proper way of taking levothyroxine  Elevated brain natriuretic peptide (BNP) level  Asymptomatic I will check echocardiogram and have the patient see Cardiology she does have some mild swelling in her ankles but no shortness of breath    Perimenopausal symptom  Will check FSH, LH         Problem List Items Addressed This Visit        Endocrine    Hypothyroidism - Primary     The patient reports me difficulty losing weight, fatigue and because of this will start her on levothyroxine 25 mcg once daily recheck 3rd generation TSH in 4 weeks along with anti TPO antibody I have explained to patient proper way of taking levothyroxine  Relevant Medications    levothyroxine 25 mcg tablet    Other Relevant Orders    TSH, 3rd generation    Anti-microsomal antibody       Other    Elevated brain natriuretic peptide (BNP) level     Asymptomatic I will check echocardiogram and have the patient see Cardiology she does have some mild swelling in her ankles but no shortness of breath         Relevant Orders    Echo complete with contrast if indicated    Ambulatory referral to Cardiology    Perimenopausal symptom     Will check FSH, LH         Relevant Orders    FSH and LH          Return to office 3 months  call if any problems  Subjective:      Patient ID: Waldo Waller is a 51 y o  female      HPI 47-year old female coming in for a follow up office visit regarding hypothyroidism, elevated BNP, perimenopausal symptoms; the patient reports me very infrequent mild intermittent night sweats her menstrual cycle is normal   She reports me also some mild shortness of breath going a flight of steps no chest pain felt to be deconditioning/out of shape  She reports me no family history of hypothyroidism that she is aware she reports to me mild ankle swelling that started after a trip at [place]  It has not worsened but has not resolved  No cold intolerance no constipation no change in hair loss pattern, she does report me difficulty losing weight despite good diet and reports to me fatigue  a little sob going up steps , gaining weight    The following portions of the patient's history were reviewed and updated as appropriate: allergies, current medications, past family history, past medical history, past social history, past surgical history and problem list     Review of Systems   Constitutional: Positive for fatigue  Negative for activity change, appetite change and unexpected weight change  Eyes: Negative for visual disturbance  Respiratory: Negative for cough and shortness of breath  Cardiovascular: Negative for chest pain  Gastrointestinal: Negative for abdominal pain, diarrhea, nausea and vomiting  Neurological: Negative for dizziness, light-headedness and headaches  difficulty losing weight    Objective:    Return in about 3 months (around 10/24/2019)  No results found    Recent Results (from the past [id number] hour(s))   Stress test only, exercise    Narrative    [facility] [address]  [address]  [phone]    Stress Electrocardiography during Exercise    Name: Janessa Maier  MR #: [id number]  Account #: [de-identified]  Study date: 2018  : 1970  Age: 53 years  Gender: Female  Height: 62 in  Weight: 148 lb  BSA: 1 68 m squared    Allergies: NO KNOWN ALLERGIES    Diagnosis: Z13 6 - Encounter for screening for cardiovascular disorders    RN:  Robert Anthony RN  Primary Physician:  Geneva Bauer DO  Referring Physician:  Geneva Bauer DO  Interpreting Physician:  Eve Leal DO  Group:  [facility]  Report Prepared By[de-identified]  Nai Garcia RN  Technician:  Bishop Brody    CLINICAL QUESTION: Detection of coronary artery disease  HISTORY: The patient is a 53year old  female  Chest pain status: no chest pain  Coronary artery disease risk factors: none  Cardiovascular history: none significant  Medications: none  PHYSICAL EXAM: Baseline physical exam screening: normal     REST ECG: Normal sinus rhythm  Nonspecific ST and T wave abnormalities were present  PROCEDURE: Treadmill exercise testing was performed, using the Jorge protocol  Stress electrocardiographic evaluation was performed  JORGE PROTOCOL:  HR bpm SBP mmHg DBP mmHg Symptoms  Baseline 78 110 70 none  Stage 1 110 126 76 none  Stage 2 136 140 86 none  Stage 3 162 152 82 none  Stage 4 176 152 82 moderate fatigue  Recovery 1 106 150 70 none  Recovery 2 103 130 72 none  No medications or fluids given  O2 sat was 98% at rest and 97% at peak exercise  STRESS SUMMARY: Duration of exercise was 10 min and 31 sec  The patient exercised to protocol stage 4  Maximal heart rate during stress was 176 bpm ( 102 % of maximal predicted heart rate)  The heart rate response to stress was normal   There was normal resting blood pressure with an appropriate response to stress  The rate-pressure product for the peak heart rate and blood pressure was 23687  There was no chest pain during stress  The stress test was terminated due to  achievement of maximal (symptom limited) exercise  There were 2 5 mm ST depressions noted at peak exercise in the inferolateral leads  The stress ECG was consistent with myocardial ischemia  Arrhythmia during stress: isolated premature  ventricular beats  SUMMARY:  -  Stress results: Duration of exercise was 10 min and 31 sec  Target heart rate was achieved   There was no chest pain during stress  -  ECG conclusions: There were 2 5 mm ST depressions noted at peak exercise in the inferolateral leads  The stress ECG was consistent with myocardial ischemia  IMPRESSIONS: Abnormal study after maximal exercise without reproduction of symptoms  There were 2 5 mm ST depressions noted in the inferolateral leads at peak exercise  To fully assess presence of ischemia, the test may be repeated with  imaging  Prepared and signed by    Lani Mortimer, DO  Signed 2018 11:43:11         No Known Allergies    No past medical history on file  Past Surgical History:   Procedure Laterality Date    BIOPSY CORE NEEDLE Right 01/10/2017    BIOPSY BREAST PERCUTANTOUS NEEDLE CORE     SECTION      DESC: 3/2000,10/2001,10/2005,2007    SD COLONOSCOPY FLX DX W/COLLJ SPEC WHEN PFRMD N/A 2018    Procedure: COLONOSCOPY;  Surgeon: Jacinta Mckenna MD;  [facility]; Service: Gastroenterology    TUBAL LIGATION      US GUIDED BREAST BIOPSY RIGHT COMPLETE Right 1/10/2017    WISDOM TOOTH EXTRACTION       No current outpatient medications on file prior to visit  No current facility-administered medications on file prior to visit        Family History   Problem Relation Age of Onset    Coronary artery disease Mother     Colon cancer Maternal Uncle         AGE 39'S    Prostate cancer Maternal Uncle         AGE 74'S    Colon cancer Maternal Aunt      Social History     Socioeconomic History    Marital status: /Civil Union     Spouse name: Not on file    Number of children: Not on file    Years of education: [address]    Highest education level: Not on file   Occupational History    Occupation: HOMEMAKER   Social Needs    Financial resource strain: Not on file    Food insecurity:     Worry: Not on file     Inability: Not on file    Transportation needs:     Medical: Not on file     Non-medical: Not on file   Tobacco Use    Smoking status: Never Smoker  Smokeless tobacco: Never Used   Substance and Sexual Activity    Alcohol use: Yes     Comment: 2-3 per month    Drug use: No    Sexual activity: Not on file     Comment: USES BIRTH CONTROL   Lifestyle    Physical activity:     Days per week: Not on file     Minutes per session: Not on file    Stress: Not on file   Relationships    Social connections:     Talks on phone: Not on file     Gets together: Not on file     Attends Alevism service: Not on file     Active member of club or organization: Not on file     Attends meetings of clubs or organizations: Not on file     Relationship status: Not on file    Intimate partner violence:     Fear of current or ex partner: Not on file     Emotionally abused: Not on file     Physically abused: Not on file     Forced sexual activity: Not on file   Other Topics Concern    Not on file   Social History Narrative    DAILY COFFEE CONSUMPTION 2 CUPS A DAY    DENIED---- HOME ENVIRONMENT DOMESTIC VIOLENCE    LIVING INDEPENDENTLY WITH SPOUSE     Vitals:    07/24/19 1003   BP: 106/70   BP Location: Right arm   Patient Position: Sitting   Cuff Size: Adult   Pulse: 60   Resp: 14   Temp: 98 8 °F (37 1 °C)   TempSrc: Oral   SpO2: 98%   Weight: 66 6 kg (146 lb 12 8 oz)   Height: 5' 2 5" (1 588 m)     Results for orders placed or performed in visit on 07/22/19   TSH, 3rd generation   Result Value Ref Range    TSH 3RD GENERATON 5 126 (H) 0 358 - 3 740 uIU/mL   NT-BNP PRO   Result Value Ref Range    NT-proBNP 196 (H) <125 pg/mL     Weight (last 2 days)     Date/Time   Weight    07/24/19 1003   66 6 (146 8)            Body mass index is 26 42 kg/m²    BP      Temp      Pulse     Resp      SpO2        Vitals:    07/24/19 1003   Weight: 66 6 kg (146 lb 12 8 oz)     Vitals:    07/24/19 1003   Weight: 66 6 kg (146 lb 12 8 oz)       /70 (BP Location: Right arm, Patient Position: Sitting, Cuff Size: Adult)   Pulse 60   Temp 98 8 °F (37 1 °C) (Oral)   Resp 14   Ht 5' 2 5" (1 588 m)   Wt 66 6 kg (146 lb 12 8 oz)   SpO2 98%   BMI 26 42 kg/m²          Physical Exam   Constitutional: She appears well-developed and well-nourished  HENT:   Head: Normocephalic  Mouth/Throat: Oropharynx is clear and moist    Eyes: Pupils are equal, round, and reactive to light  Conjunctivae are normal  Right eye exhibits no discharge  Left eye exhibits no discharge  No scleral icterus  Neck: Neck supple  Cardiovascular: Normal rate, regular rhythm, normal heart sounds and intact distal pulses  Exam reveals no gallop and no friction rub  No murmur heard  Pulmonary/Chest: Breath sounds normal  No respiratory distress  She has no wheezes  She has no rales  Abdominal: Soft  Bowel sounds are normal  She exhibits no distension and no mass  There is no tenderness  There is no rebound and no guarding  Musculoskeletal: She exhibits no edema or deformity  Lymphadenopathy:     She has no cervical adenopathy  Neurological: She is alert   Coordination normal      trace bi pedal edema

## 2019-07-29 ENCOUNTER — HOSPITAL ENCOUNTER (OUTPATIENT)
Dept: NON INVASIVE DIAGNOSTICS | Facility: CLINIC | Age: 49
Discharge: HOME | End: 2019-07-29
Payer: COMMERCIAL

## 2019-07-29 DIAGNOSIS — R60.9 EDEMA, UNSPECIFIED TYPE: ICD-10-CM

## 2019-07-29 PROCEDURE — 93970 EXTREMITY STUDY: CPT

## 2019-07-29 PROCEDURE — 93970 EXTREMITY STUDY: CPT | Performed by: SURGERY

## 2019-08-27 ENCOUNTER — APPOINTMENT (OUTPATIENT)
Dept: LAB | Facility: CLINIC | Age: 49
End: 2019-08-27
Payer: COMMERCIAL

## 2019-08-27 DIAGNOSIS — N95.1 PERIMENOPAUSAL SYMPTOM: ICD-10-CM

## 2019-08-27 DIAGNOSIS — E03.9 HYPOTHYROIDISM, UNSPECIFIED TYPE: ICD-10-CM

## 2019-08-27 LAB
FSH SERPL-ACNC: 38.5 MIU/ML
LH SERPL-ACNC: 17.4 MIU/ML
TSH SERPL DL<=0.05 MIU/L-ACNC: 2.78 UIU/ML (ref 0.36–3.74)

## 2019-08-27 PROCEDURE — 84443 ASSAY THYROID STIM HORMONE: CPT

## 2019-08-27 PROCEDURE — 86376 MICROSOMAL ANTIBODY EACH: CPT

## 2019-08-27 PROCEDURE — 83002 ASSAY OF GONADOTROPIN (LH): CPT

## 2019-08-27 PROCEDURE — 83001 ASSAY OF GONADOTROPIN (FSH): CPT

## 2019-08-27 PROCEDURE — 36415 COLL VENOUS BLD VENIPUNCTURE: CPT

## 2019-08-28 LAB — THYROPEROXIDASE AB SERPL-ACNC: 25 IU/ML (ref 0–34)

## 2019-09-03 ENCOUNTER — CONSULT (OUTPATIENT)
Dept: CARDIOLOGY CLINIC | Facility: CLINIC | Age: 49
End: 2019-09-03
Payer: COMMERCIAL

## 2019-09-03 VITALS
BODY MASS INDEX: 27.6 KG/M2 | SYSTOLIC BLOOD PRESSURE: 116 MMHG | WEIGHT: 150 LBS | HEIGHT: 62 IN | DIASTOLIC BLOOD PRESSURE: 80 MMHG | OXYGEN SATURATION: 98 %

## 2019-09-03 DIAGNOSIS — R60.0 LOCALIZED EDEMA: ICD-10-CM

## 2019-09-03 DIAGNOSIS — R79.89 ELEVATED BRAIN NATRIURETIC PEPTIDE (BNP) LEVEL: Primary | ICD-10-CM

## 2019-09-03 PROCEDURE — 93000 ELECTROCARDIOGRAM COMPLETE: CPT | Performed by: INTERNAL MEDICINE

## 2019-09-03 PROCEDURE — 99204 OFFICE O/P NEW MOD 45 MIN: CPT | Performed by: INTERNAL MEDICINE

## 2019-09-03 NOTE — PATIENT INSTRUCTIONS
Heart Failure   AMBULATORY CARE:   Heart failure (HF) is a condition that does not allow your heart to fill or pump properly  Not enough oxygen in your blood gets to your organs and tissues  HF can occur in the right side, the left side, or both lower chambers of your heart  HF is often caused by damage or injury to your heart  The damage may be caused by heart attack, other heart conditions, or high blood pressure  HF is a long-term condition that tends to get worse over time  It is important to manage your health to improve your quality of life  HF can be worsened by heavy alcohol use, smoking, diabetes that is not controlled, or obesity  Common signs and symptoms:   · Difficulty breathing with activity that worsens to difficulty breathing at rest    · Shortness of breath while lying flat    · Severe shortness of breath and coughing at night that usually wakes you     · Chest pain at night    · Periods of no breathing, then breathing fast    · Fatigue or lack of energy (often worsened by physical activity)     · Swelling in your ankles, legs, or abdomen    · Fast heartbeat, purple color around your mouth and nailbeds    · Fingers and toes cool to the touch  Call 911 if:   · You have any of the following signs of a heart attack:      ¨ Squeezing, pressure, or pain in your chest that lasts longer than 5 minutes or returns    ¨ Discomfort or pain in your back, neck, jaw, stomach, or arm     ¨ Trouble breathing    ¨ Nausea or vomiting    ¨ Lightheadedness or a sudden cold sweat, especially with chest pain or trouble breathing    Seek care immediately if:   · You gain 3 or more pounds (1 4 kg) in a day, or more than your healthcare provider says you should  · Your heartbeat is fast, slow, or uneven all the time    Contact your healthcare provider if:   · You have symptoms of worsening HF:      ¨ Shortness of breath at rest, at night, or that is getting worse in any way     ¨ Weight gain of 5 or more pounds (2 2 kg) in a week     ¨ More swelling in your legs or ankles     ¨ Abdominal pain or swelling     ¨ More coughing     ¨ Loss of appetite     ¨ Feeling tired all the time    · You feel hopeless or depressed, or you have lost interest in things you used to enjoy  · You often feel worried or afraid  · You have questions or concerns about your condition or care  Treatment for HF  may include any of the following:  · Medicines  may be needed to help regulate your heart rhythm  You may also need medicines to lower your blood pressure, and to get rid of extra fluids  · Oxygen  may help you breathe easier if your oxygen level is lower than normal  A CPAP machine may be used to keep your airway open while you sleep  · Surgery  can be done to implant a pacemaker in your chest to regulate your heart rhythm  Other types of surgery can open blocked heart vessels, replace a damaged heart valve, or remove scar tissue  Manage or prevent HF:   · Do not smoke  Nicotine and other chemicals in cigarettes and cigars can cause lung damage and make HF difficult to manage  Ask your healthcare provider for information if you currently smoke and need help to quit  E-cigarettes or smokeless tobacco still contain nicotine  Talk to your healthcare provider before you use these products  · Do not drink alcohol or take illegal drugs  Alcohol and drugs can worsen your symptoms quickly  · Weigh yourself every morning  Use the same scale, in the same spot  Do this after you use the bathroom, but before you eat or drink anything  Wear the same type of clothing  Do not wear shoes  Record your weight each day so you will notice any sudden weight gain  Swelling and weight gain are signs of fluid retention  If you are overweight, ask how to lose weight safely  · Check your blood pressure and heart rate every day  Ask for more information about how to measure your blood pressure and heart rate correctly   Ask what these numbers should be for you  · Manage any chronic health conditions you have  These include high blood pressure, diabetes, obesity, high cholesterol, metabolic syndrome, and COPD  You will have fewer symptoms if you manage these health conditions  Follow your healthcare provider's recommendations and follow up with him or her regularly  · Eat heart-healthy foods and limit sodium (salt)  An easy way to do this is to eat more fresh fruits and vegetables and fewer canned and processed foods  Replace butter and margarine with heart-healthy oils such as olive oil and canola oil  Other heart-healthy foods include walnuts, whole-grain breads, low-fat dairy products, beans, and lean meats  Fatty fish such as salmon and tuna are also heart healthy  Ask how much salt you can eat each day  Do not use salt substitutes  · Drink liquids as directed  You may need to limit the amount of liquids you drink if you retain fluid  Ask how much liquid to drink each day and which liquids are best for you  · Stay active  If you are not active, your symptoms are likely to worsen quickly  Walking, bicycling, and other types of physical activity help maintain your strength and improve your mood  Physical activity also helps you manage your weight  Work with your healthcare provider to create an exercise plan that is right for you  · Get vaccines as directed  Get a flu shot every year  You may also need the pneumonia vaccine  The flu and pneumonia can be severe for a person who has HF  Vaccines protect you from these infections  Join a support group:  Living with HF can be difficult  It may be helpful to talk with others who have HF  You may learn how to better manage your condition or get emotional support  For more information:   · [address]  [place] , [place]   Phone: 4- [phone]  Web Address: https://[employer]/  org   Follow up with your healthcare provider or cardiologist within 2 weeks or as directed: You may need to return for other tests  You may need home health care  A healthcare provider will monitor your vital signs, weight, and make sure your medicines are working  Write down your questions so you remember to ask them during your visits  © 2017 [employer] Information is for End User's use only and may not be sold, redistributed or otherwise used for commercial purposes  All illustrations and images included in CareNotes® are the copyrighted property of [employer] , [employer]  or [employer]  The above information is an  only  It is not intended as medical advice for individual conditions or treatments  Talk to your doctor, nurse or pharmacist before following any medical regimen to see if it is safe and effective for you

## 2019-09-03 NOTE — PROGRESS NOTES
Cardiology Consultation     Jason Kunz  [id number]  1970  [facility] [facility] [id number]    1  Elevated brain natriuretic peptide (BNP) level  Ambulatory referral to Cardiology    POCT ECG   2  Localized edema       CC: Evaluation of abnormal BNP    HPI: Patient is here for evaluation of abnormal BNP that was ordered for LE edema and difficulty with weight loss  She has been ordered to have an echo, which has not yet been completed yet  No shortness of breath with exertion or rest   She has no chest pain, orthopnea, PND, palpitations, lightheadedness, syncope  She had LE edema, usually at the end of the day when she's on her her feet for the day, goes away by morning  This was especially noted after vacation after a long drive - which was worse then, but better now  No pregnancy related complications like preeclampsia or GDM  Recently diagnosed with hypothyroidism  Patient Active Problem List   Diagnosis    Wellness examination    Family history of colon cancer    Vision disturbance    Screening for cardiovascular condition    Edema    Hypothyroidism    Elevated brain natriuretic peptide (BNP) level    Perimenopausal symptom     History reviewed  No pertinent past medical history    Social History     Socioeconomic History    Marital status: /Civil Union     Spouse name: Not on file    Number of children: Not on file    Years of education: [address]    Highest education level: Not on file   Occupational History    Occupation: HOMEMAKER   Social Needs    Financial resource strain: Not on file    Food insecurity:     Worry: Not on file     Inability: Not on file    Transportation needs:     Medical: Not on file     Non-medical: Not on file   Tobacco Use    Smoking status: Never Smoker    Smokeless tobacco: Never Used   Substance and Sexual Activity    Alcohol use: Yes     Comment: 2-3 per month    Drug use: No    Sexual activity: Not on file     Comment: USES BIRTH CONTROL   Lifestyle    Physical activity:     Days per week: Not on file     Minutes per session: Not on file    Stress: Not on file   Relationships    Social connections:     Talks on phone: Not on file     Gets together: Not on file     Attends Restorationism service: Not on file     Active member of club or organization: Not on file     Attends meetings of clubs or organizations: Not on file     Relationship status: Not on file    Intimate partner violence:     Fear of current or ex partner: Not on file     Emotionally abused: Not on file     Physically abused: Not on file     Forced sexual activity: Not on file   Other Topics Concern    Not on file   Social History Narrative    DAILY COFFEE CONSUMPTION 2 CUPS A DAY    DENIED---- HOME ENVIRONMENT DOMESTIC VIOLENCE    LIVING INDEPENDENTLY WITH SPOUSE      Family History   Problem Relation Age of Onset    Coronary artery disease Mother     Heart attack Mother     Colon cancer Maternal Aunt    Mother's father and brother: heart attacks    Past Surgical History:   Procedure Laterality Date    BIOPSY CORE NEEDLE Right 01/10/2017    BIOPSY BREAST PERCUTANTOUS NEEDLE CORE     SECTION      DESC: 3/2000,10/2001,10/2005,2007     SECTION      OH COLONOSCOPY FLX DX W/COLLJ SPEC WHEN PFRMD N/A 2018    Procedure: COLONOSCOPY;  Surgeon: Pamela Hinton MD;  [facility];   Service: Gastroenterology    TUBAL LIGATION      US GUIDED BREAST BIOPSY RIGHT COMPLETE Right 1/10/2017    WISDOM TOOTH EXTRACTION         Current Outpatient Medications:     levothyroxine 25 mcg tablet, Take 1 tablet (25 mcg total) by mouth daily, Disp: 30 tablet, Rfl: 3  No Known Allergies  Vitals:    19 0838   BP: 116/80   BP Location: Left arm   Patient Position: Sitting   Cuff Size: Standard   SpO2: 98%   Weight: 68 kg (150 lb) Height: 5' 2" (1 575 m)       Labs:  Appointment on 08/27/2019   Component Date Value    TSH 3RD GENERATON 08/27/2019 2 780     THYROID MICROSOMAL ANTIB* 08/27/2019 25     LH 08/27/2019 17 4     [address] 08/27/2019 38 5    Appointment on 07/22/2019   Component Date Value    TSH 3RD GENERATON 07/22/2019 5 126*    NT-proBNP 07/22/2019 196*   Appointment on 07/15/2019   Component Date Value    Sodium 07/15/2019 137     Potassium 07/15/2019 3 7     Chloride 07/15/2019 102     CO2 07/15/2019 29     ANION GAP 07/15/2019 6     BUN 07/15/2019 15     Creatinine 07/15/2019 0 74     Glucose, Fasting 07/15/2019 88     Calcium 07/15/2019 8 3     AST 07/15/2019 15     ALT 07/15/2019 17     Alkaline Phosphatase 07/15/2019 51     Total Protein 07/15/2019 6 9     Albumin 07/15/2019 3 7     Total Bilirubin 07/15/2019 0 60     eGFR 07/15/2019 96     Cholesterol 07/15/2019 150     Triglycerides 07/15/2019 62     HDL, Direct 07/15/2019 63*    LDL Calculated 07/15/2019 75     TSH 3RD GENERATON 07/15/2019 6 049*    NT-proBNP 07/15/2019 195*     Lab Results   Component Value Date    CHOL 151 06/30/2015    TRIG 62 07/15/2019    TRIG 15 06/30/2015    HDL 63 (H) 07/15/2019    HDL 73 06/30/2015     Imaging: No results found  Review of Systems:  Review of Systems   Constitutional: Negative for activity change, appetite change, chills, diaphoresis, fatigue and unexpected weight change  HENT: Negative for hearing loss, nosebleeds and sore throat  Eyes: Negative for photophobia and visual disturbance  Respiratory: Negative for cough, chest tightness, shortness of breath and wheezing  Cardiovascular: Positive for leg swelling  Negative for chest pain and palpitations  Gastrointestinal: Negative for abdominal pain, diarrhea, nausea and vomiting  Endocrine: Negative for polyuria  Genitourinary: Negative for dysuria, frequency and hematuria     Musculoskeletal: Negative for arthralgias, back pain, gait problem and neck pain  Skin: Negative for pallor and rash  Neurological: Negative for dizziness, syncope and headaches  Hematological: Does not bruise/bleed easily  Psychiatric/Behavioral: Negative for behavioral problems and confusion  Physical Exam:  Physical Exam   Constitutional: She is oriented to person, place, and time  She appears well-developed and well-nourished  HENT:   Head: Normocephalic and atraumatic  Nose: Nose normal    Eyes: Pupils are equal, round, and reactive to light  EOM are normal  No scleral icterus  Neck: Normal range of motion  Neck supple  No JVD present  Cardiovascular: Normal rate, regular rhythm and normal heart sounds  Exam reveals no gallop and no friction rub  No murmur heard  Pulmonary/Chest: Effort normal and breath sounds normal  No respiratory distress  She has no wheezes  She has no rales  Abdominal: Soft  Bowel sounds are normal  She exhibits no distension  There is no tenderness  Musculoskeletal: Normal range of motion  She exhibits no edema or deformity  Neurological: She is alert and oriented to person, place, and time  No cranial nerve deficit  Skin: Skin is warm and dry  No rash noted  She is not diaphoretic  Psychiatric: She has a normal mood and affect  Her behavior is normal    Vitals reviewed  Blood pressure 116/80, height 5' 2" (1 575 m), weight 68 kg (150 lb), SpO2 98 %  EKG:  Normal sinus rhythm with short OK  Otherwise normal ECG    Discussion/Summary:  Abnormal BNP: very mild elevation above upper limit of normal   Does not appear to be in clinical CHF  Will review echo once available  Edema: will evaluate for cardiac source with an echo  Could be related to hypothyroidism

## 2019-09-05 ENCOUNTER — HOSPITAL ENCOUNTER (OUTPATIENT)
Dept: NON INVASIVE DIAGNOSTICS | Facility: CLINIC | Age: 49
Discharge: HOME | End: 2019-09-05
Payer: COMMERCIAL

## 2019-09-05 DIAGNOSIS — R79.89 ELEVATED BRAIN NATRIURETIC PEPTIDE (BNP) LEVEL: ICD-10-CM

## 2019-09-05 DIAGNOSIS — E03.9 HYPOTHYROIDISM, UNSPECIFIED TYPE: Primary | ICD-10-CM

## 2019-09-05 PROCEDURE — 93306 TTE W/DOPPLER COMPLETE: CPT | Performed by: INTERNAL MEDICINE

## 2019-09-05 PROCEDURE — 93306 TTE W/DOPPLER COMPLETE: CPT

## 2019-10-12 ENCOUNTER — IMMUNIZATIONS (OUTPATIENT)
Dept: INTERNAL MEDICINE CLINIC | Facility: CLINIC | Age: 49
End: 2019-10-12
Payer: COMMERCIAL

## 2019-10-12 DIAGNOSIS — Z23 ENCOUNTER FOR IMMUNIZATION: ICD-10-CM

## 2019-10-12 PROCEDURE — 90471 IMMUNIZATION ADMIN: CPT

## 2019-10-12 PROCEDURE — 90662 IIV NO PRSV INCREASED AG IM: CPT

## 2019-10-22 ENCOUNTER — OFFICE VISIT (OUTPATIENT)
Dept: INTERNAL MEDICINE CLINIC | Facility: CLINIC | Age: 49
End: 2019-10-22
Payer: COMMERCIAL

## 2019-10-22 VITALS
WEIGHT: 151.2 LBS | HEIGHT: 62 IN | HEART RATE: 60 BPM | SYSTOLIC BLOOD PRESSURE: 110 MMHG | OXYGEN SATURATION: 96 % | BODY MASS INDEX: 27.82 KG/M2 | DIASTOLIC BLOOD PRESSURE: 78 MMHG | RESPIRATION RATE: 16 BRPM

## 2019-10-22 DIAGNOSIS — E03.9 HYPOTHYROIDISM, UNSPECIFIED TYPE: Primary | ICD-10-CM

## 2019-10-22 DIAGNOSIS — R79.89 ELEVATED BRAIN NATRIURETIC PEPTIDE (BNP) LEVEL: ICD-10-CM

## 2019-10-22 DIAGNOSIS — R60.9 EDEMA, UNSPECIFIED TYPE: ICD-10-CM

## 2019-10-22 DIAGNOSIS — N95.1 PERIMENOPAUSAL SYMPTOM: ICD-10-CM

## 2019-10-22 PROCEDURE — 99214 OFFICE O/P EST MOD 30 MIN: CPT | Performed by: INTERNAL MEDICINE

## 2019-10-22 PROCEDURE — 3008F BODY MASS INDEX DOCD: CPT | Performed by: INTERNAL MEDICINE

## 2019-10-22 NOTE — PROGRESS NOTES
BMI Counseling: Body mass index is 27 65 kg/m²  Discussed the patient's BMI with her  The BMI is above normal  Nutrition recommendations include reducing portion sizes  Assessment/Plan:    Hypothyroidism  Hypothyroidism controlled the patient is currently euthyroid I will be ordering a TSH prior to the next office visit and the patient will continue with current medical regiment; we will continue to monitor the patient's progress  Edema  Improved likely secondary to myxedema/hypothyroidism symptoms improved with levothyroxine cardiac workup/renal workup were unremarkable  Symptoms improved after starting the levothyroxine  Elevated brain natriuretic peptide (BNP) level  Mild elevation currently stable no evidence of congestive heart failure she is doing well likely lab anomaly    Perimenopausal symptom  Reviewed the recent laboratories likely is the source of weight gain we do encourage a healthy/balance diet         Problem List Items Addressed This Visit        Endocrine    Hypothyroidism - Primary     Hypothyroidism controlled the patient is currently euthyroid I will be ordering a TSH prior to the next office visit and the patient will continue with current medical regiment; we will continue to monitor the patient's progress  Relevant Orders    TSH, 3rd generation    Lipid Panel with Direct LDL reflex       Other    Edema     Improved likely secondary to myxedema/hypothyroidism symptoms improved with levothyroxine cardiac workup/renal workup were unremarkable  Symptoms improved after starting the levothyroxine           Relevant Orders    Comprehensive metabolic panel    Lipid Panel with Direct LDL reflex    Elevated brain natriuretic peptide (BNP) level     Mild elevation currently stable no evidence of congestive heart failure she is doing well likely lab anomaly         Perimenopausal symptom     Reviewed the recent laboratories likely is the source of weight gain we do encourage a healthy/balance diet               Return to office 6  months  call if any problems  Subjective:      Patient ID: Kenya Arrieta is a 51 y o  female  HPI 47-year old female coming in for a follow up office visit regarding hypothyroidism, edema, elevated BNP, perimenopausal symptoms; The patient reports me compliant taking medications without untoward side effects the  The patient is here to review his medical condition, update me on the medical condition and the patient reports me no hospitalizations and no ER visits  Patient reports me she is tolerating the levothyroxine, her edema has improved since starting the levothyroxine  She is here to review her laboratories  She is seeing Cardiology and currently stable  The following portions of the patient's history were reviewed and updated as appropriate: allergies, current medications, past family history, past medical history, past social history, past surgical history and problem list     Review of Systems   Constitutional: Negative for activity change, appetite change and unexpected weight change  HENT: Negative for congestion and postnasal drip  Eyes: Negative for visual disturbance  Respiratory: Negative for cough and shortness of breath  Cardiovascular: Negative for chest pain  Gastrointestinal: Negative for abdominal pain, diarrhea, nausea and vomiting  Neurological: Negative for dizziness, light-headedness and headaches  Hematological: Negative for adenopathy  Objective:    No follow-ups on file  No results found    Recent Results (from the past [id number] hour(s))   Echo complete with contrast if indicated    Narrative    [place] [address], [address] [facility]  [phone]    Transthoracic Echocardiogram  2D, M-mode, Doppler, and Color Doppler    Study date:  05-Sep-2019    Patient: Reina Moseley  MR number: [id number]  Account number: [de-identified]  : 15-Sacha-1970  Age: 51 years  Gender: Female  Status: Outpatient  Location: [facility] [place]  Height: 62 5 in  Weight: 146 lb  BP: 106/ 70 mmHg    Indications: Elevated BNP; Edema    Diagnoses: R60 9 - Edema, unspecified    Sonographer:  CARYN Gaytan, RDCS  Referring Physician:  Alba Muhammad DO  Group:  [facility]  Interpreting Physician:  Butch Olivia MD    SUMMARY    LEFT VENTRICLE:  Size was normal   Systolic function was normal  Ejection fraction was estimated to be 55 %  Wall thickness was normal   Left ventricular diastolic function parameters were normal     RIGHT VENTRICLE:  The size was normal   Systolic function was normal     MITRAL VALVE:  There was trace to mild regurgitation  TRICUSPID VALVE:  There was trace regurgitation  HISTORY: PRIOR HISTORY: Edema; Elevated BNP; Hypothyroid    PROCEDURE: The study was performed in the [facility] [place]  This was a routine study  The transthoracic approach was used  The study included complete 2D imaging, M-mode, complete spectral Doppler, and color Doppler  The  heart rate was 61 bpm, at the start of the study  Images were obtained from the parasternal, apical, subcostal, and suprasternal notch acoustic windows  Image quality was adequate  LEFT VENTRICLE: Size was normal  Systolic function was normal  Ejection fraction was estimated to be 55 %  There were no regional wall motion abnormalities  Wall thickness was normal  DOPPLER: Left ventricular diastolic function parameters  were normal     RIGHT VENTRICLE: The size was normal  Systolic function was normal  Wall thickness was normal     LEFT ATRIUM: Size was normal     RIGHT ATRIUM: Size was normal     MITRAL VALVE: Valve structure was normal  There was normal leaflet separation  DOPPLER: The transmitral velocity was within the normal range  There was no evidence for stenosis  There was trace to mild regurgitation  AORTIC VALVE: The valve was trileaflet   Leaflets exhibited normal thickness and normal cuspal separation  DOPPLER: Transaortic velocity was within the normal range  There was no evidence for stenosis  There was no regurgitation  TRICUSPID VALVE: The valve structure was normal  There was normal leaflet separation  DOPPLER: The transtricuspid velocity was within the normal range  There was no evidence for stenosis  There was trace regurgitation  The tricuspid jet  envelope definition was inadequate for estimation of RV systolic pressure  There are no indirect findings suggesive of moderate or severe pulmonary hypertension  PULMONIC VALVE: Leaflets exhibited normal thickness, no calcification, and normal cuspal separation  DOPPLER: The transpulmonic velocity was within the normal range  There was no regurgitation  PERICARDIUM: There was no pericardial effusion  The pericardium was normal in appearance  AORTA: The root exhibited normal size      SYSTEM MEASUREMENT TABLES    2D  %FS: 34 77 %  AV Diam: 2 72 cm  EDV(Teich): 93 26 ml  EF(Cube): 72 25 %  EF(Teich): 64 1 %  ESV(Cube): 25 58 ml  ESV(Teich): 33 49 ml  IVSd: 0 93 cm  LA Area: 12 88 cm2  LA Diam: 3 52 cm  LVEDV MOD A4C: 65 81 ml  LVEF MOD A4C: 66 84 %  LVESV MOD A4C: 21 82 ml  LVIDd: 4 52 cm  LVIDs: 2 95 cm  LVLd A4C: 6 57 cm  LVLs A4C: 4 85 cm  LVPWd: 0 86 cm  RA Area: 11 65 cm2  RV Diam: 2 67 cm  SV MOD A4C: 43 98 ml  SV(Cube): 66 58 ml  SV(Teich): 59 78 ml    CW  TR MaxP 43 mmHg  TR Vmax: 2 03 m/s    MM  TAPSE: 2 34 cm    PW  E': 0 09 m/s  E/E': 8 52  MV A Vitaliy: 0 38 m/s  MV Dec Deuel: 3 78 m/s2  MV DecT: 193 51 ms  MV E Vitaliy: 0 73 m/s  MV E/A Ratio: 1 91    [employer] Accredited Echocardiography Laboratory    Prepared and electronically signed by    Deborah Benitez MD  Signed 05-Sep-2019 15:42:41     POCT ECG    Impression    Normal sinus rhythm with short ND  Otherwise normal ECG   Stress test only, exercise    Narrative    [address] [facility] [address]  [place] [address] [facility]  [phone]    Stress Electrocardiography during Exercise    Name: Efra Roblero  MR #: [id number]  Account #: [de-identified]  Study date: 2018  : 1970  Age: 53 years  Gender: Female  Height: 62 in  Weight: 148 lb  BSA: 1 68 m squared    Allergies: NO KNOWN ALLERGIES    Diagnosis: Z13 6 - Encounter for screening for cardiovascular disorders    RN:  Leticia Mcginnis RN  Primary Physician:  Koffi Escalante DO  Referring Physician:  Koffi Escalante DO  Interpreting Physician:  Elicia Najjar, DO  Group:  [facility]  Report Prepared By[de-identified]  Leticia Mcginnis RN  Technician:  Seth Currie    CLINICAL QUESTION: Detection of coronary artery disease  HISTORY: The patient is a 53year old  female  Chest pain status: no chest pain  Coronary artery disease risk factors: none  Cardiovascular history: none significant  Medications: none  PHYSICAL EXAM: Baseline physical exam screening: normal     REST ECG: Normal sinus rhythm  Nonspecific ST and T wave abnormalities were present  PROCEDURE: Treadmill exercise testing was performed, using the Mari protocol  Stress electrocardiographic evaluation was performed  MARI PROTOCOL:  HR bpm SBP mmHg DBP mmHg Symptoms  Baseline 78 110 70 none  Stage 1 110 126 76 none  Stage 2 136 140 86 none  Stage 3 162 152 82 none  Stage 4 176 152 82 moderate fatigue  Recovery 1 106 150 70 none  Recovery 2 103 130 72 none  No medications or fluids given  O2 sat was 98% at rest and 97% at peak exercise  STRESS SUMMARY: Duration of exercise was 10 min and 31 sec  The patient exercised to protocol stage 4  Maximal heart rate during stress was 176 bpm ( 102 % of maximal predicted heart rate)  The heart rate response to stress was normal   There was normal resting blood pressure with an appropriate response to stress  The rate-pressure product for the peak heart rate and blood pressure was 49448   There was no chest pain during stress  The stress test was terminated due to  achievement of maximal (symptom limited) exercise  There were 2 5 mm ST depressions noted at peak exercise in the inferolateral leads  The stress ECG was consistent with myocardial ischemia  Arrhythmia during stress: isolated premature  ventricular beats  SUMMARY:  -  Stress results: Duration of exercise was 10 min and 31 sec  Target heart rate was achieved  There was no chest pain during stress  -  ECG conclusions: There were 2 5 mm ST depressions noted at peak exercise in the inferolateral leads  The stress ECG was consistent with myocardial ischemia  IMPRESSIONS: Abnormal study after maximal exercise without reproduction of symptoms  There were 2 5 mm ST depressions noted in the inferolateral leads at peak exercise  To fully assess presence of ischemia, the test may be repeated with  imaging  Prepared and signed by    Jimbo Kelley DO  Signed 2018 11:43:11         No Known Allergies    History reviewed  No pertinent past medical history  Past Surgical History:   Procedure Laterality Date    BIOPSY CORE NEEDLE Right 01/10/2017    BIOPSY BREAST PERCUTANTOUS NEEDLE CORE     SECTION      DESC: 3/2000,10/2001,10/2005,2007     SECTION      MA COLONOSCOPY FLX DX W/COLLJ SPEC WHEN PFRMD N/A 2018    Procedure: COLONOSCOPY;  Surgeon: Xi Steele MD;  [facility]; Service: Gastroenterology    TUBAL LIGATION      US GUIDED BREAST BIOPSY RIGHT COMPLETE Right 1/10/2017    WISDOM TOOTH EXTRACTION       Current Outpatient Medications on File Prior to Visit   Medication Sig Dispense Refill    levothyroxine 25 mcg tablet Take 1 tablet (25 mcg total) by mouth daily 30 tablet 3     No current facility-administered medications on file prior to visit        Family History   Problem Relation Age of Onset    Coronary artery disease Mother     Heart attack Mother     Colon cancer Maternal Aunt      Social History Socioeconomic History    Marital status: /Civil Union     Spouse name: Not on file    Number of children: Not on file    Years of education: 2ND YEAR COLLEGE    Highest education level: Not on file   Occupational History    Occupation: HOMEMAKER   Social Needs    Financial resource strain: Not on file    Food insecurity:     Worry: Not on file     Inability: Not on file    Transportation needs:     Medical: Not on file     Non-medical: Not on file   Tobacco Use    Smoking status: Never Smoker    Smokeless tobacco: Never Used   Substance and Sexual Activity    Alcohol use: Yes     Comment: 2-3 per month    Drug use: No    Sexual activity: Not on file     Comment: USES BIRTH CONTROL   Lifestyle    Physical activity:     Days per week: Not on file     Minutes per session: Not on file    Stress: Not on file   Relationships    Social connections:     Talks on phone: Not on file     Gets together: Not on file     Attends Episcopal service: Not on file     Active member of club or organization: Not on file     Attends meetings of clubs or organizations: Not on file     Relationship status: Not on file    Intimate partner violence:     Fear of current or ex partner: Not on file     Emotionally abused: Not on file     Physically abused: Not on file     Forced sexual activity: Not on file   Other Topics Concern    Not on file   Social History Narrative    DAILY COFFEE CONSUMPTION 2 CUPS A DAY    DENIED---- HOME ENVIRONMENT DOMESTIC VIOLENCE    LIVING INDEPENDENTLY WITH SPOUSE     Vitals:    10/22/19 0934   BP: 110/78   Pulse: 60   Resp: 16   SpO2: 96%   Weight: 68 6 kg (151 lb 3 2 oz)   Height: 5' 2" (1 575 m)     Results for orders placed or performed in visit on 08/27/19   TSH, 3rd generation   Result Value Ref Range    TSH 3RD GENERATON 2 780 0 358 - 3 740 uIU/mL   Anti-microsomal antibody   Result Value Ref Range    THYROID MICROSOMAL ANTIBODY 25 0 - 34 IU/mL   Luteinizing hormone   Result Value Ref Range    LH 10 0   mIU/mL   Follicle stimulating hormone   Result Value Ref Range    FSH 38 5   mIU/mL     Weight (last 2 days)     Date/Time   Weight    10/22/19 0934   68 6 (151 2)            Body mass index is 27 65 kg/m²  BP      Temp      Pulse     Resp      SpO2        Vitals:    10/22/19 0934   Weight: 68 6 kg (151 lb 3 2 oz)     Vitals:    10/22/19 0934   Weight: 68 6 kg (151 lb 3 2 oz)       /78   Pulse 60   Resp 16   Ht 5' 2" (1 575 m)   Wt 68 6 kg (151 lb 3 2 oz)   SpO2 96%   BMI 27 65 kg/m²          Physical Exam   Constitutional: She appears well-developed and well-nourished  HENT:   Head: Normocephalic  Mouth/Throat: Oropharynx is clear and moist    Eyes: Pupils are equal, round, and reactive to light  Conjunctivae are normal  Right eye exhibits no discharge  Left eye exhibits no discharge  No scleral icterus  Neck: Neck supple  Cardiovascular: Normal rate, regular rhythm, normal heart sounds and intact distal pulses  Exam reveals no gallop and no friction rub  No murmur heard  Pulmonary/Chest: Breath sounds normal  No respiratory distress  She has no wheezes  She has no rales  Abdominal: Soft  Bowel sounds are normal  She exhibits no distension and no mass  There is no tenderness  There is no rebound and no guarding  Musculoskeletal: She exhibits no edema or deformity  Lymphadenopathy:     She has no cervical adenopathy  Neurological: She is alert   Coordination normal      no edema

## 2019-10-23 NOTE — ASSESSMENT & PLAN NOTE
Mild elevation currently stable no evidence of congestive heart failure she is doing well likely lab anomaly

## 2019-10-23 NOTE — ASSESSMENT & PLAN NOTE
Improved likely secondary to myxedema/hypothyroidism symptoms improved with levothyroxine cardiac workup/renal workup were unremarkable  Symptoms improved after starting the levothyroxine

## 2019-10-23 NOTE — ASSESSMENT & PLAN NOTE
Reviewed the recent laboratories likely is the source of weight gain we do encourage a healthy/balance diet

## 2019-11-11 DIAGNOSIS — E03.9 HYPOTHYROIDISM, UNSPECIFIED TYPE: ICD-10-CM

## 2019-11-11 RX ORDER — LEVOTHYROXINE SODIUM 0.03 MG/1
25 TABLET ORAL DAILY
Qty: 90 TABLET | Refills: 1 | Status: SHIPPED | OUTPATIENT
Start: 2019-11-11 | End: 2020-04-26

## 2019-12-18 ENCOUNTER — ANNUAL EXAM (OUTPATIENT)
Dept: OBGYN CLINIC | Facility: CLINIC | Age: 49
End: 2019-12-18
Payer: COMMERCIAL

## 2019-12-18 VITALS
DIASTOLIC BLOOD PRESSURE: 80 MMHG | SYSTOLIC BLOOD PRESSURE: 110 MMHG | WEIGHT: 151.8 LBS | BODY MASS INDEX: 27.94 KG/M2 | HEIGHT: 62 IN

## 2019-12-18 DIAGNOSIS — Z01.419 ENCOUNTER FOR GYNECOLOGICAL EXAMINATION WITHOUT ABNORMAL FINDING: ICD-10-CM

## 2019-12-18 DIAGNOSIS — Z01.419 PAP SMEAR, AS PART OF ROUTINE GYNECOLOGICAL EXAMINATION: ICD-10-CM

## 2019-12-18 DIAGNOSIS — Z12.39 BREAST CANCER SCREENING: Primary | ICD-10-CM

## 2019-12-18 PROCEDURE — 99396 PREV VISIT EST AGE 40-64: CPT | Performed by: OBSTETRICS & GYNECOLOGY

## 2019-12-18 NOTE — PROGRESS NOTES
Assessment/Plan:    No problem-specific Assessment & Plan notes found for this encounter  Normal gynecological physical examination  Self-breast examination stressed  Mammogram ordered  Discussed regular exercise, healthy diet, importance of vitamin D and calcium supplements  Discussed importance of sun block use during periods of prolonged sun exposure  Patient will be seen in 1 year for routine gynecologic and medical examination  Patient will call office for any problems, concerns, or issues which may arise during the interim  Diagnoses and all orders for this visit:    Breast cancer screening  -     Mammo screening bilateral w cad; Future    Encounter for gynecological examination without abnormal finding  -     Thinprep Pap (Refl) HPV mRNA E6/E7    Pap smear, as part of routine gynecological examination        Subjective:      Patient ID: Maylin Hayes is a 51 y o  female  Patient is a 50-year-old female who presents today for her annual gynecologic and medical examination    Patient reports that her menstrual cycles are still fairly regular but that they can be either heavy are very light  She denies any significant pain or cramping associated with them  She also denies any erratic intermenstrual spotting or bleeding as well  Patient reports normal bowel and bladder habits    She denies any significant pelvic or abdominal pain    She also denies any headaches, chest pain, shortness of breath, fever, shakes or chills    She is being followed medically for hypothyroidism    The importance of breast self-examination was stressed to the patient and she is up-to-date with screening mammography  Arrangements for her annual mammogram replaced into the EMR system at today's visit            The following portions of the patient's history were reviewed and updated as appropriate: allergies, current medications, past family history, past medical history, past social history, past surgical history and problem list     Review of Systems   Constitutional: Negative  Negative for appetite change, diaphoresis, fatigue, fever and unexpected weight change  HENT: Negative  Eyes: Negative  Respiratory: Negative  Cardiovascular: Negative  Gastrointestinal: Negative  Negative for abdominal pain, blood in stool, constipation, diarrhea, nausea and vomiting  Endocrine: Negative  Negative for cold intolerance and heat intolerance  Followed for hypothyroidism   Genitourinary: Negative  Negative for dysuria, frequency, hematuria, urgency, vaginal bleeding, vaginal discharge and vaginal pain  Musculoskeletal: Negative  Skin: Negative  Allergic/Immunologic: Negative  Neurological: Negative  Hematological: Negative  Negative for adenopathy  Psychiatric/Behavioral: Negative  Objective:      /80   Ht 5' 2" (1 575 m)   Wt 68 9 kg (151 lb 12 8 oz)   LMP 12/04/2019   BMI 27 76 kg/m²          Physical Exam   Constitutional: She is oriented to person, place, and time  She appears well-developed and well-nourished  HENT:   Head: Normocephalic  Eyes: Pupils are equal, round, and reactive to light  Neck: Normal range of motion  Neck supple  Cardiovascular: Normal rate and regular rhythm  Pulmonary/Chest: Effort normal and breath sounds normal  Right breast exhibits no inverted nipple, no mass, no nipple discharge, no skin change and no tenderness  Left breast exhibits no inverted nipple, no mass, no nipple discharge, no skin change and no tenderness  Breasts are symmetrical    Abdominal: Soft  Normal appearance and bowel sounds are normal    Genitourinary: Rectum normal, vagina normal and uterus normal  Rectal exam shows guaiac negative stool  Pelvic exam was performed with patient supine  There is no rash or lesion on the right labia  There is no rash or lesion on the left labia  Uterus is not enlarged and not tender   Cervix exhibits no discharge and no friability  Right adnexum displays no mass, no tenderness and no fullness  Left adnexum displays no mass, no tenderness and no fullness  No erythema, tenderness or bleeding in the vagina  No vaginal discharge found  Musculoskeletal: Normal range of motion  Lymphadenopathy:     She has no cervical adenopathy  She has no axillary adenopathy  Right: No inguinal and no supraclavicular adenopathy present  Left: No inguinal and no supraclavicular adenopathy present  Neurological: She is alert and oriented to person, place, and time  Skin: Skin is warm, dry and intact  No rash noted  Psychiatric: She has a normal mood and affect   Her speech is normal and behavior is normal  Judgment and thought content normal  Cognition and memory are normal

## 2020-01-25 ENCOUNTER — HOSPITAL ENCOUNTER (OUTPATIENT)
Dept: RADIOLOGY | Age: 50
Discharge: HOME | End: 2020-01-25
Payer: COMMERCIAL

## 2020-01-25 VITALS — WEIGHT: 145 LBS | HEIGHT: 62 IN | BODY MASS INDEX: 26.68 KG/M2

## 2020-01-25 DIAGNOSIS — Z12.39 BREAST CANCER SCREENING: ICD-10-CM

## 2020-01-25 PROCEDURE — 77067 SCR MAMMO BI INCL CAD: CPT

## 2020-04-24 DIAGNOSIS — E03.9 HYPOTHYROIDISM, UNSPECIFIED TYPE: ICD-10-CM

## 2020-04-26 RX ORDER — LEVOTHYROXINE SODIUM 0.03 MG/1
TABLET ORAL
Qty: 90 TABLET | Refills: 3 | Status: SHIPPED | OUTPATIENT
Start: 2020-04-26 | End: 2020-07-13

## 2020-06-15 ENCOUNTER — APPOINTMENT (OUTPATIENT)
Dept: LAB | Facility: CLINIC | Age: 50
End: 2020-06-15
Payer: COMMERCIAL

## 2020-06-15 ENCOUNTER — TRANSCRIBE ORDERS (OUTPATIENT)
Dept: LAB | Facility: CLINIC | Age: 50
End: 2020-06-15

## 2020-06-15 DIAGNOSIS — E03.9 HYPOTHYROIDISM, UNSPECIFIED TYPE: ICD-10-CM

## 2020-06-15 DIAGNOSIS — R60.9 EDEMA, UNSPECIFIED TYPE: ICD-10-CM

## 2020-06-15 LAB
ALBUMIN SERPL BCP-MCNC: 3.6 G/DL (ref 3.5–5)
ALP SERPL-CCNC: 63 U/L (ref 46–116)
ALT SERPL W P-5'-P-CCNC: 19 U/L (ref 12–78)
ANION GAP SERPL CALCULATED.3IONS-SCNC: 5 MMOL/L (ref 4–13)
AST SERPL W P-5'-P-CCNC: 15 U/L (ref 5–45)
BILIRUB SERPL-MCNC: 0.28 MG/DL (ref 0.2–1)
BUN SERPL-MCNC: 19 MG/DL (ref 5–25)
CALCIUM SERPL-MCNC: 8.7 MG/DL (ref 8.3–10.1)
CHLORIDE SERPL-SCNC: 104 MMOL/L (ref 100–108)
CHOLEST SERPL-MCNC: 169 MG/DL (ref 50–200)
CO2 SERPL-SCNC: 29 MMOL/L (ref 21–32)
CREAT SERPL-MCNC: 0.73 MG/DL (ref 0.6–1.3)
GFR SERPL CREATININE-BSD FRML MDRD: 97 ML/MIN/1.73SQ M
GLUCOSE P FAST SERPL-MCNC: 90 MG/DL (ref 65–99)
HDLC SERPL-MCNC: 58 MG/DL
LDLC SERPL CALC-MCNC: 98 MG/DL (ref 0–100)
POTASSIUM SERPL-SCNC: 3.9 MMOL/L (ref 3.5–5.3)
PROT SERPL-MCNC: 6.7 G/DL (ref 6.4–8.2)
SODIUM SERPL-SCNC: 138 MMOL/L (ref 136–145)
TRIGL SERPL-MCNC: 63 MG/DL
TSH SERPL DL<=0.05 MIU/L-ACNC: 6.7 UIU/ML (ref 0.36–3.74)

## 2020-06-15 PROCEDURE — 80061 LIPID PANEL: CPT

## 2020-06-15 PROCEDURE — 84443 ASSAY THYROID STIM HORMONE: CPT

## 2020-06-15 PROCEDURE — 80053 COMPREHEN METABOLIC PANEL: CPT

## 2020-06-15 PROCEDURE — 36415 COLL VENOUS BLD VENIPUNCTURE: CPT

## 2020-06-16 DIAGNOSIS — R79.89 ELEVATED TSH: Primary | ICD-10-CM

## 2020-07-13 ENCOUNTER — OFFICE VISIT (OUTPATIENT)
Dept: INTERNAL MEDICINE CLINIC | Facility: CLINIC | Age: 50
End: 2020-07-13
Payer: COMMERCIAL

## 2020-07-13 VITALS
WEIGHT: 152.4 LBS | BODY MASS INDEX: 28.05 KG/M2 | RESPIRATION RATE: 16 BRPM | OXYGEN SATURATION: 98 % | HEIGHT: 62 IN | HEART RATE: 66 BPM | DIASTOLIC BLOOD PRESSURE: 84 MMHG | TEMPERATURE: 98.7 F | SYSTOLIC BLOOD PRESSURE: 110 MMHG

## 2020-07-13 DIAGNOSIS — E01.0 THYROMEGALY: Primary | ICD-10-CM

## 2020-07-13 DIAGNOSIS — Z00.00 WELLNESS EXAMINATION: ICD-10-CM

## 2020-07-13 DIAGNOSIS — E03.9 HYPOTHYROIDISM, UNSPECIFIED TYPE: ICD-10-CM

## 2020-07-13 PROCEDURE — 3008F BODY MASS INDEX DOCD: CPT | Performed by: INTERNAL MEDICINE

## 2020-07-13 PROCEDURE — 99396 PREV VISIT EST AGE 40-64: CPT | Performed by: INTERNAL MEDICINE

## 2020-07-13 RX ORDER — LEVOTHYROXINE SODIUM 0.03 MG/1
50 TABLET ORAL DAILY
Start: 2020-07-13 | End: 2021-03-26

## 2020-07-13 NOTE — PROGRESS NOTES
Assessment/Plan:    Wellness examination  Assessment and plan 1  Health maintenance annual wellness examination overall the patient is clinically stable and doing well, we encouraged the patient to follow a healthy and balanced diet  We recommend that the patient exercise routinely approximately 30 minutes 5 times per week   We have reviewed the patient's vaccines and have made recommendations for updates if necessary  annual flu vaccine      We will be ordering screening laboratories which are age appropriate  Return to the office in   1 year   call if any problems  Problem List Items Addressed This Visit        Endocrine    Hypothyroidism    Relevant Medications    levothyroxine 25 mcg tablet       Other    Wellness examination     Assessment and plan 1  Health maintenance annual wellness examination overall the patient is clinically stable and doing well, we encouraged the patient to follow a healthy and balanced diet  We recommend that the patient exercise routinely approximately 30 minutes 5 times per week   We have reviewed the patient's vaccines and have made recommendations for updates if necessary  annual flu vaccine      We will be ordering screening laboratories which are age appropriate  Return to the office in   1 year   call if any problems  Other Visit Diagnoses     Thyromegaly    -  Primary    Relevant Medications    levothyroxine 25 mcg tablet    Other Relevant Orders    Anti-microsomal antibody    US thyroid          Return to office 12  months  call if any problems  Subjective:      Patient ID: Chet Galvan is a 53 y o  female  HPI 50-year-old female coming in for annual wellness examination overall she is doing very well she is walking routinely she brushes and losses the teeth routinely she wears a sunscreen, drives a car safely, wears a seatbelt, she reports me that she is up-to-date on her mammogram and has seen OBGYN for routine examination    She is up-to-date on her colonoscopy  She is walking on a daily basis  dentist 2/yr, car saf hot flashes , monthly mensd, hot flashes moderate can interfere with sleep    The following portions of the patient's history were reviewed and updated as appropriate: allergies, current medications, past family history, past medical history, past social history, past surgical history and problem list     Review of Systems   Constitutional: Negative for activity change, appetite change and unexpected weight change  HENT: Negative for congestion and postnasal drip  Respiratory: Negative for cough and shortness of breath  Cardiovascular: Negative for chest pain  Gastrointestinal: Negative for abdominal pain, diarrhea, nausea and vomiting  Neurological: Negative for dizziness, light-headedness and headaches  Objective:    No follow-ups on file  No results found  No Known Allergies    No past medical history on file  Past Surgical History:   Procedure Laterality Date    BIOPSY CORE NEEDLE Right 01/10/2017    BIOPSY BREAST PERCUTANTOUS NEEDLE CORE    BREAST BIOPSY Right 01/10/2017    core     SECTION      DESC: 3/2000,10/2001,10/2005,2007     SECTION      NM COLONOSCOPY FLX DX W/COLLJ SPEC WHEN PFRMD N/A 2018    Procedure: COLONOSCOPY;  Surgeon: Radha Rdz MD;  [facility]; Service: Gastroenterology    TUBAL LIGATION      US GUIDED BREAST BIOPSY RIGHT COMPLETE Right 1/10/2017    WISDOM TOOTH EXTRACTION       Current Outpatient Medications on File Prior to Visit   Medication Sig Dispense Refill    [DISCONTINUED] levothyroxine 25 mcg tablet TAKE 1 TABLET DAILY (Patient taking differently: Take 50 mcg by mouth daily ) 90 tablet 3     No current facility-administered medications on file prior to visit        Family History   Problem Relation Age of Onset    Coronary artery disease Mother     Heart attack Mother     No Known Problems Father     Colon cancer Maternal Aunt     No Known Problems Daughter     No Known Problems Maternal Grandmother     No Known Problems Maternal Grandfather     No Known Problems Paternal Grandmother     No Known Problems Paternal Grandfather     No Known Problems Maternal Aunt     No Known Problems Paternal Aunt      Social History     Socioeconomic History    Marital status: /Civil Union     Spouse name: Not on file    Number of children: Not on file    Years of education: 2ND YEAR COLLEGE    Highest education level: Not on file   Occupational History    Occupation: HOMEMAKER   Social Needs    Financial resource strain: Not on file    Food insecurity:     Worry: Not on file     Inability: Not on file    Transportation needs:     Medical: Not on file     Non-medical: Not on file   Tobacco Use    Smoking status: Never Smoker    Smokeless tobacco: Never Used   Substance and Sexual Activity    Alcohol use: Yes     Comment: 2-3 per month    Drug use: No    Sexual activity: Not on file     Comment: USES BIRTH CONTROL   Lifestyle    Physical activity:     Days per week: Not on file     Minutes per session: Not on file    Stress: Not on file   Relationships    Social connections:     Talks on phone: Not on file     Gets together: Not on file     Attends Jew service: Not on file     Active member of club or organization: Not on file     Attends meetings of clubs or organizations: Not on file     Relationship status: Not on file    Intimate partner violence:     Fear of current or ex partner: Not on file     Emotionally abused: Not on file     Physically abused: Not on file     Forced sexual activity: Not on file   Other Topics Concern    Not on file   Social History Narrative    DAILY COFFEE CONSUMPTION 2 CUPS A DAY    DENIED---- HOME ENVIRONMENT DOMESTIC VIOLENCE    LIVING INDEPENDENTLY WITH SPOUSE     Vitals:    07/13/20 0801   BP: 110/84   Pulse: 66   Resp: 16   Temp: 98 7 °F (37 1 °C)   TempSrc: Temporal SpO2: 98%   Weight: 69 1 kg (152 lb 6 4 oz)   Height: 5' 2" (1 575 m)     Results for orders placed or performed in visit on 06/15/20   TSH, 3rd generation   Result Value Ref Range    TSH 3RD GENERATON 6 701 (H) 0 358 - 3 740 uIU/mL   Comprehensive metabolic panel   Result Value Ref Range    Sodium 138 136 - 145 mmol/L    Potassium 3 9 3 5 - 5 3 mmol/L    Chloride 104 100 - 108 mmol/L    CO2 29 21 - 32 mmol/L    ANION GAP 5 4 - 13 mmol/L    BUN 19 5 - 25 mg/dL    Creatinine 0 73 0 60 - 1 30 mg/dL    Glucose, Fasting 90 65 - 99 mg/dL    Calcium 8 7 8 3 - 10 1 mg/dL    AST 15 5 - 45 U/L    ALT 19 12 - 78 U/L    Alkaline Phosphatase 63 46 - 116 U/L    Total Protein 6 7 6 4 - 8 2 g/dL    Albumin 3 6 3 5 - 5 0 g/dL    Total Bilirubin 0 28 0 20 - 1 00 mg/dL    eGFR 97 ml/min/1 73sq m   Lipid Panel with Direct LDL reflex   Result Value Ref Range    Cholesterol 169 50 - 200 mg/dL    Triglycerides 63 <=150 mg/dL    HDL, Direct 58 >=40 mg/dL    LDL Calculated 98 0 - 100 mg/dL     Weight (last 2 days)     Date/Time   Weight    07/13/20 0801   69 1 (152 4)            Body mass index is 27 87 kg/m²  BP      Temp      Pulse     Resp      SpO2        Vitals:    07/13/20 0801   Weight: 69 1 kg (152 lb 6 4 oz)     Vitals:    07/13/20 0801   Weight: 69 1 kg (152 lb 6 4 oz)       /84   Pulse 66   Temp 98 7 °F (37 1 °C) (Temporal)   Resp 16   Ht 5' 2" (1 575 m)   Wt 69 1 kg (152 lb 6 4 oz)   SpO2 98%   BMI 27 87 kg/m²          Physical Exam   Constitutional: She appears well-developed and well-nourished  HENT:   Head: Normocephalic  Eyes: Pupils are equal, round, and reactive to light  Conjunctivae are normal  Right eye exhibits no discharge  Left eye exhibits no discharge  No scleral icterus  Neck: Neck supple  Cardiovascular: Normal rate, regular rhythm, normal heart sounds and intact distal pulses  Exam reveals no gallop and no friction rub  No murmur heard    Pulmonary/Chest: Breath sounds normal  No respiratory distress  She has no wheezes  She has no rales  Abdominal: Soft  Bowel sounds are normal  She exhibits no distension and no mass  There is no tenderness  There is no rebound and no guarding  Musculoskeletal: She exhibits no edema or deformity  Lymphadenopathy:     She has no cervical adenopathy  Neurological: She is alert  Coordination normal      ?   Left thyromegaly verses nodule

## 2020-07-13 NOTE — ASSESSMENT & PLAN NOTE
Assessment and plan 1  Health maintenance annual wellness examination overall the patient is clinically stable and doing well, we encouraged the patient to follow a healthy and balanced diet  We recommend that the patient exercise routinely approximately 30 minutes 5 times per week   We have reviewed the patient's vaccines and have made recommendations for updates if necessary  annual flu vaccine      We will be ordering screening laboratories which are age appropriate  Return to the office in   1 year   call if any problems

## 2020-07-14 ENCOUNTER — APPOINTMENT (OUTPATIENT)
Dept: LAB | Facility: CLINIC | Age: 50
End: 2020-07-14
Payer: COMMERCIAL

## 2020-07-14 DIAGNOSIS — R79.89 ELEVATED TSH: ICD-10-CM

## 2020-07-14 DIAGNOSIS — E01.0 THYROMEGALY: ICD-10-CM

## 2020-07-14 LAB — TSH SERPL DL<=0.05 MIU/L-ACNC: 1.52 UIU/ML (ref 0.36–3.74)

## 2020-07-14 PROCEDURE — 36415 COLL VENOUS BLD VENIPUNCTURE: CPT

## 2020-07-14 PROCEDURE — 86376 MICROSOMAL ANTIBODY EACH: CPT

## 2020-07-14 PROCEDURE — 84443 ASSAY THYROID STIM HORMONE: CPT

## 2020-07-15 DIAGNOSIS — E03.9 HYPOTHYROIDISM, UNSPECIFIED TYPE: Primary | ICD-10-CM

## 2020-07-15 LAB — THYROPEROXIDASE AB SERPL-ACNC: 24 IU/ML (ref 0–34)

## 2020-07-15 RX ORDER — LEVOTHYROXINE SODIUM 0.05 MG/1
50 TABLET ORAL DAILY
Qty: 30 TABLET | Refills: 5 | Status: SHIPPED | OUTPATIENT
Start: 2020-07-15 | End: 2020-08-27 | Stop reason: SDUPTHER

## 2020-07-21 ENCOUNTER — HOSPITAL ENCOUNTER (OUTPATIENT)
Dept: ULTRASOUND IMAGING | Facility: HOSPITAL | Age: 50
Discharge: HOME | End: 2020-07-21
Payer: COMMERCIAL

## 2020-07-21 DIAGNOSIS — E01.0 THYROMEGALY: ICD-10-CM

## 2020-07-21 PROCEDURE — 76536 US EXAM OF HEAD AND NECK: CPT

## 2020-08-27 DIAGNOSIS — E03.9 HYPOTHYROIDISM, UNSPECIFIED TYPE: ICD-10-CM

## 2020-08-28 RX ORDER — LEVOTHYROXINE SODIUM 0.05 MG/1
50 TABLET ORAL DAILY
Qty: 30 TABLET | Refills: 5 | Status: SHIPPED | OUTPATIENT
Start: 2020-08-28 | End: 2020-09-01 | Stop reason: SDUPTHER

## 2020-09-01 DIAGNOSIS — E03.9 HYPOTHYROIDISM, UNSPECIFIED TYPE: ICD-10-CM

## 2020-09-01 RX ORDER — LEVOTHYROXINE SODIUM 0.05 MG/1
50 TABLET ORAL DAILY
Qty: 90 TABLET | Refills: 1 | Status: SHIPPED | OUTPATIENT
Start: 2020-09-01 | End: 2021-02-17

## 2020-12-07 ENCOUNTER — ANNUAL EXAM (OUTPATIENT)
Dept: OBGYN CLINIC | Facility: CLINIC | Age: 50
End: 2020-12-07
Payer: COMMERCIAL

## 2020-12-07 VITALS
DIASTOLIC BLOOD PRESSURE: 74 MMHG | BODY MASS INDEX: 28.16 KG/M2 | SYSTOLIC BLOOD PRESSURE: 120 MMHG | WEIGHT: 153 LBS | HEIGHT: 62 IN

## 2020-12-07 DIAGNOSIS — Z71.9 ENCOUNTER FOR CONSULTATION: ICD-10-CM

## 2020-12-07 DIAGNOSIS — Z01.419 ENCNTR FOR GYN EXAM (GENERAL) (ROUTINE) W/O ABN FINDINGS: Primary | ICD-10-CM

## 2020-12-07 DIAGNOSIS — Z12.31 ENCOUNTER FOR SCREENING MAMMOGRAM FOR MALIGNANT NEOPLASM OF BREAST: ICD-10-CM

## 2020-12-07 DIAGNOSIS — Z01.419 PAP SMEAR, AS PART OF ROUTINE GYNECOLOGICAL EXAMINATION: ICD-10-CM

## 2020-12-07 DIAGNOSIS — R14.0 BLOATING: ICD-10-CM

## 2020-12-07 PROCEDURE — 99396 PREV VISIT EST AGE 40-64: CPT | Performed by: OBSTETRICS & GYNECOLOGY

## 2020-12-10 LAB
CLINICAL INFO: NORMAL
CYTO CVX: NORMAL
DATE PREVIOUS BX: NORMAL
HPV I/H RISK 1 DNA CVX QL PROBE+SIG AMP: NOT DETECTED
LMP START DATE: NORMAL
SL AMB PREV. PAP:: NORMAL
SPECIMEN SOURCE CVX/VAG CYTO: NORMAL

## 2021-01-11 ENCOUNTER — ULTRASOUND (OUTPATIENT)
Dept: OBGYN CLINIC | Facility: CLINIC | Age: 51
End: 2021-01-11
Payer: COMMERCIAL

## 2021-01-11 DIAGNOSIS — R14.0 BLOATING: Primary | ICD-10-CM

## 2021-01-11 PROCEDURE — 76856 US EXAM PELVIC COMPLETE: CPT | Performed by: OBSTETRICS & GYNECOLOGY

## 2021-01-11 NOTE — PROGRESS NOTES
AMB US Pelvic Non OB    Date/Time: 1/11/2021 9:00 AM  Performed by: Rory Morin  Authorized by: Laurita Lewis MD     Procedure details:     Indications comment:  Bloating    Technique:  US Pelvic, Non-OB with complete exam  Uterine findings:     Length (cm): 8    Height (cm):  4 4    Width (cm):  3 4    Uterine adhesions: not identified      Adnexal mass: not identified      Polyps: not identified      Myomas: not identified      Endometrial stripe: identified      Endometrial hyperplasia: not identified      Endometrium thickness (mm):  3  Left ovary findings:     Left ovary:  Visualized    Cysts: not identified      Length (cm): 2 4    Height (cm): 2 3    Width (cm): 1 6  Right ovary findings:     Right ovary:  Visualized    Cysts: not identified      Length (cm): 2 5    Height (cm): 2    Width (cm): 1 2  Other findings:     Free pelvic fluid: not identified      Free peritoneal fluid: not identified    Post-Procedure Details:     Impression:  Endo=3mm, WNL    Tolerance:   Tolerated well, no immediate complications

## 2021-01-14 NOTE — PROGRESS NOTES
Pelvic ultrasound results were normal    Patient may return for follow-up as regularly scheduled    Thanks

## 2021-02-06 ENCOUNTER — HOSPITAL ENCOUNTER (OUTPATIENT)
Dept: RADIOLOGY | Age: 51
Discharge: HOME | End: 2021-02-06
Payer: COMMERCIAL

## 2021-02-06 VITALS — WEIGHT: 145 LBS | HEIGHT: 62 IN | BODY MASS INDEX: 26.68 KG/M2

## 2021-02-06 DIAGNOSIS — Z12.31 ENCOUNTER FOR SCREENING MAMMOGRAM FOR MALIGNANT NEOPLASM OF BREAST: ICD-10-CM

## 2021-02-06 PROCEDURE — 77067 SCR MAMMO BI INCL CAD: CPT

## 2021-02-06 PROCEDURE — 77063 BREAST TOMOSYNTHESIS BI: CPT

## 2021-02-17 DIAGNOSIS — E03.9 HYPOTHYROIDISM, UNSPECIFIED TYPE: ICD-10-CM

## 2021-02-17 RX ORDER — LEVOTHYROXINE SODIUM 0.05 MG/1
TABLET ORAL
Qty: 90 TABLET | Refills: 3 | Status: SHIPPED | OUTPATIENT
Start: 2021-02-17 | End: 2022-02-14

## 2021-02-19 ENCOUNTER — TELEPHONE (OUTPATIENT)
Dept: HEMATOLOGY ONCOLOGY | Facility: CLINIC | Age: 51
End: 2021-02-19

## 2021-02-19 NOTE — TELEPHONE ENCOUNTER
New Patient Breast Form   Patient Details:     Brittany Giraldo     1970     [id number]     Background Information:   [address] starts by opening a telephone encounter and gathering the following information   Who is calling to schedule and relationship? self   Referring Provider Chelsy Alicia   To which speciality is the referral?  Surgical Oncology   Reason for Visit?  history   Tumor Type?  breast lump/normal mammo   Is there a confimed diagnosis from biopsy/tissue reviewed by Pathology? No   Date of Tissue Diagnosis (If done outside of [facility] please obtain report and slides)    Is patient aware of diagnosis, and who made them aware? yes   If no tissue diagnosis, please stop and discuss with Navigator prior to scheduling   When was the diagnosis made? 2017   Were outside slides requested     If biopsy done externally, obtain reports and slides for internal review   Have you had any imaging or labs done? Yes   If YES, when and  where was the blood work done? [facility] 2/6/21   If outside of [facility] obtain records   Was the patient told to bring a disk? no   Are records in EPIC? yes   Is there a personal history of cancer? no   If YES please list type and YR diagnosed    If patient has a prior history of breast cancer were old records obtained? Have you ever had genetic testing done for breast cancer? If so, can you please bring a copy to appointment for timely treatment planning No   Is there a family history of cancer? yes   If YES please list type colon   Does the patient smoke or Vape? no   If yes, how many packs or cartridges per day? Scheduling Information:   [facility]   Are there any days the patient cannot be seen? Miscellaneous: prior pt of Dr Arpan Sanchez, last seen 2017 re: benign lump  She would like this removed as it has grown  After completing the above information, please route to finance, nurse navigation and clinical trials for review

## 2021-03-26 ENCOUNTER — TELEMEDICINE (OUTPATIENT)
Dept: INTERNAL MEDICINE CLINIC | Facility: CLINIC | Age: 51
End: 2021-03-26
Payer: COMMERCIAL

## 2021-03-26 VITALS — WEIGHT: 150 LBS | HEIGHT: 62 IN | BODY MASS INDEX: 27.6 KG/M2

## 2021-03-26 DIAGNOSIS — Z20.822 EXPOSURE TO COVID-19 VIRUS: ICD-10-CM

## 2021-03-26 DIAGNOSIS — Z20.822 EXPOSURE TO COVID-19 VIRUS: Primary | ICD-10-CM

## 2021-03-26 PROCEDURE — 99442 PR PHYS/QHP TELEPHONE EVALUATION 11-20 MIN: CPT | Performed by: NURSE PRACTITIONER

## 2021-03-26 PROCEDURE — U0005 INFEC AGEN DETEC AMPLI PROBE: HCPCS | Performed by: NURSE PRACTITIONER

## 2021-03-26 PROCEDURE — U0003 INFECTIOUS AGENT DETECTION BY NUCLEIC ACID (DNA OR RNA); SEVERE ACUTE RESPIRATORY SYNDROME CORONAVIRUS 2 (SARS-COV-2) (CORONAVIRUS DISEASE [COVID-19]), AMPLIFIED PROBE TECHNIQUE, MAKING USE OF HIGH THROUGHPUT TECHNOLOGIES AS DESCRIBED BY CMS-2020-01-R: HCPCS | Performed by: NURSE PRACTITIONER

## 2021-03-26 NOTE — PROGRESS NOTES
COVID-19 Virtual Visit     Assessment/Plan:    Problem List Items Addressed This Visit     None      Visit Diagnoses     Exposure to COVID-19 virus    -  Primary    Relevant Orders    Novel Coronavirus (Covid-19),PCR [facility] - Collected at Mobile Vans or [facility]         Disposition:     I recommended self-quarantine for 10 days and to watch for symptoms until 14 days after exposure  If patient were to develop symptoms, they should self isolate and call our office for further guidance  I referred patient to one of our centralized sites for a COVID-19 swab  I have spent 15 minutes directly with the patient  Greater than 50% of this time was spent in counseling/coordination of care regarding: risks and benefits of treatment options and impressions  [facility]    Provider located at [address]  [address] [facility] [address]    Recent Visits  No visits were found meeting these conditions  Showing recent visits within past 7 days and meeting all other requirements     Today's Visits  Date Type Provider Dept   03/26/21 Telemedicine Sybil Meza today's visits and meeting all other requirements     Future Appointments  No visits were found meeting these conditions  Showing future appointments within next 150 days and meeting all other requirements        Patient agrees to participate in a virtual check in via telephone or video visit instead of presenting to the office to address urgent/immediate medical needs  Patient is aware this is a billable service  After connecting through Telephone, the patient was identified by name and date of birth  Courtney Zamora was informed that this was a telemedicine visit and that the exam was being conducted confidentially over secure lines  Courtney Zamora acknowledged consent and understanding of privacy and security of the telemedicine visit   I informed the patient that I have reviewed her record in [address] [place] [address] and presented the opportunity for her to ask any questions regarding the visit today  The patient agreed to participate  Subjective:   Todd Ornelas is a 53 y o  female who is concerned about COVID-19  Patient's symptoms include fatigue, nasal congestion and cough  Patient denies fever, chills, malaise, rhinorrhea, sore throat, anosmia, loss of taste, shortness of breath, chest tightness, abdominal pain, nausea, vomiting, diarrhea, myalgias and headaches  Date of symptom onset: 3/23/2021    Exposure:   Contact with a person who is under investigation (PUI) for or who is positive for COVID-19 within the last 14 days?: Yes    Hospitalized recently for fever and/or lower respiratory symptoms?: No      Currently a healthcare worker that is involved in direct patient care?: No      Works in a special setting where the risk of COVID-19 transmission may be high? (this may include long-term care, correctional and halfway facilities; homeless shelters; assisted-living facilities and group homes ): No      Resident in a special setting where the risk of COVID-19 transmission may be high? (this may include long-term care, correctional and halfway facilities; homeless shelters; assisted-living facilities and group homes ): No      No results found for: [address] [facility] [address], [address] [facility], [address]  No past medical history on file  Past Surgical History:   Procedure Laterality Date    BIOPSY CORE NEEDLE Right 01/10/2017    BIOPSY BREAST PERCUTANTOUS NEEDLE CORE    BREAST BIOPSY Right 01/10/2017     biopsy benign     SECTION      DESC: 3/2000,10/2001,10/2005,2007     SECTION      DC COLONOSCOPY FLX DX W/COLLJ SPEC WHEN PFRMD N/A 2018    Procedure: COLONOSCOPY;  Surgeon: Matthew Solitario MD;  [facility];   Service: Gastroenterology    TUBAL LIGATION      US GUIDED BREAST BIOPSY RIGHT COMPLETE Right 1/10/2017    KALEB TOOTH EXTRACTION       Current Outpatient Medications   Medication Sig Dispense Refill    levothyroxine 25 mcg tablet Take 2 tablets (50 mcg total) by mouth daily (Patient not taking: Reported on 12/7/2020)      levothyroxine 50 mcg tablet TAKE 1 TABLET DAILY 90 tablet 3     No current facility-administered medications for this visit  No Known Allergies    Review of Systems   Constitutional: Positive for fatigue  Negative for chills and fever  HENT: Positive for congestion  Negative for rhinorrhea and sore throat  Respiratory: Positive for cough  Negative for chest tightness and shortness of breath  Gastrointestinal: Negative for abdominal pain, diarrhea, nausea and vomiting  Musculoskeletal: Negative for myalgias  Neurological: Negative for headaches  VIRTUAL VISIT DISCLAIMER    Elys Garcia acknowledges that she has consented to an online visit or consultation  She understands that the online visit is based solely on information provided by her, and that, in the absence of a face-to-face physical evaluation by the physician, the diagnosis she receives is both limited and provisional in terms of accuracy and completeness  This is not intended to replace a full medical face-to-face evaluation by the physician  Elsy Garcia understands and accepts these terms

## 2021-03-27 LAB — SARS-COV-2 RNA RESP QL NAA+PROBE: POSITIVE

## 2021-03-29 ENCOUNTER — TELEMEDICINE (OUTPATIENT)
Dept: INTERNAL MEDICINE CLINIC | Facility: CLINIC | Age: 51
End: 2021-03-29
Payer: COMMERCIAL

## 2021-03-29 DIAGNOSIS — U07.1 COVID-19: Primary | ICD-10-CM

## 2021-03-29 PROCEDURE — 1036F TOBACCO NON-USER: CPT | Performed by: INTERNAL MEDICINE

## 2021-03-29 PROCEDURE — 99213 OFFICE O/P EST LOW 20 MIN: CPT | Performed by: INTERNAL MEDICINE

## 2021-03-29 NOTE — PROGRESS NOTES
COVID-19 Virtual Visit     Assessment/Plan:    Problem List Items Addressed This Visit        Other    COVID-19 - Primary       Mild covid-19 review the nasal PCR positive for COVID-19 her symptoms are very mild I have explained to patient she must remain in quarantine times 10 days she is to monitor her pulse ox, temperature if she develops high temperature or increasing short of breath go immediately to the ER plenty of fluids, rest will check on this patient 3 days if any change or worsen contact me immediately call if any problems  Disposition:     I recommended continued isolation until at least 24 hours have passed since recovery defined as resolution of fever without the use of fever-reducing medications AND improvement in COVID symptoms AND 10 days have passed since onset of symptoms (or 10 days have passed since date of first positive viral diagnostic test for asymptomatic patients)  I have spent 15 minutes directly with the patient  Greater than 50% of this time was spent in counseling/coordination of care regarding: diagnostic results, risks and benefits of treatment options, instructions for management, patient and family education, importance of treatment compliance, risk factor reductions and impressions  Encounter provider Alba Muhammad DO    Provider located at [address] [place] [phone]    Recent Visits  Date Type Provider Dept   03/26/21 Telemedicine Sybil Sprague recent visits within past 7 days and meeting all other requirements     Today's Visits  Date Type Provider Dept   03/29/21 [address] Cristal Jhaveri DO [address] [place] [address] today's visits and meeting all other requirements     Future Appointments  No visits were found meeting these conditions     Showing future appointments within next 150 days and meeting all other requirements This virtual check-in was done via [employer] and patient was informed that this is a secure, HIPAA-compliant platform  She agrees to proceed  Patient agrees to participate in a virtual check in via telephone or video visit instead of presenting to the office to address urgent/immediate medical needs  Patient is aware this is a billable service  After connecting through [facility], the patient was identified by name and date of birth  Marcin Arriola was informed that this was a telemedicine visit and that the exam was being conducted confidentially over secure lines  My office door was closed  No one else was in the room  Marcin Arriola acknowledged consent and understanding of privacy and security of the telemedicine visit  I informed the patient that I have reviewed her record in Epic and presented the opportunity for her to ask any questions regarding the visit today  The patient agreed to participate  Subjective:   Marcin Arriola is a 53 y o  female who has been screened for COVID-19  Patient's symptoms include chills, nasal congestion, rhinorrhea, anosmia, loss of taste, myalgias and headache  Patient denies fever, cough, shortness of breath, chest tightness, abdominal pain, nausea, vomiting and diarrhea  Fatigue: josé miguel Flores has been staying home and has isolated themselves in her home  She is taking care to not share personal items and is cleaning all surfaces that are touched often, like counters, tabletops, and doorknobs using household cleaning sprays or wipes  She is wearing a mask when she leaves her room  Date of symptom onset: 3/23/2021  Date of positive COVID-19 PCR: 3/26/2021   was exposed and positive sinus congestion , cold evening , chills no temp ,   Lab Results   Component Value Date    SARSCOV2 Positive (A) 03/26/2021     No past medical history on file    Past Surgical History:   Procedure Laterality Date    BIOPSY CORE NEEDLE Right 01/10/2017    BIOPSY BREAST PERCUTANTOUS NEEDLE CORE    BREAST BIOPSY Right 01/10/2017     biopsy benign     SECTION      DESC: 3/2000,10/2001,10/2005,2007     SECTION      [place] COLONOSCOPY FLX DX W/COLLJ SPEC WHEN PFRMD N/A 2018    Procedure: COLONOSCOPY;  Surgeon: Winston Jacobo MD;  [facility]; Service: Gastroenterology    TUBAL LIGATION      US GUIDED BREAST BIOPSY RIGHT COMPLETE Right 1/10/2017    WISDOM TOOTH EXTRACTION       Current Outpatient Medications   Medication Sig Dispense Refill    levothyroxine 50 mcg tablet TAKE 1 TABLET DAILY 90 tablet 3     No current facility-administered medications for this visit  No Known Allergies    Review of Systems   Constitutional: Positive for chills  Negative for fever  Fatigue: chronic  HENT: Positive for congestion and rhinorrhea  Respiratory: Negative for cough, chest tightness and shortness of breath  Gastrointestinal: Negative for abdominal pain, diarrhea, nausea and vomiting  Musculoskeletal: Positive for myalgias  Neurological: Positive for headaches  Objective: There were no vitals filed for this visit  Physical Exam  VIRTUAL VISIT DISCLAIMER    Elsy Garcia acknowledges that she has consented to an online visit or consultation  She understands that the online visit is based solely on information provided by her, and that, in the absence of a face-to-face physical evaluation by the physician, the diagnosis she receives is both limited and provisional in terms of accuracy and completeness  This is not intended to replace a full medical face-to-face evaluation by the physician  Elsy Garcia understands and accepts these terms

## 2021-03-30 NOTE — ASSESSMENT & PLAN NOTE
Mild covid-19 review the nasal PCR positive for COVID-19 her symptoms are very mild I have explained to patient she must remain in quarantine times 10 days she is to monitor her pulse ox, temperature if she develops high temperature or increasing short of breath go immediately to the ER plenty of fluids, rest will check on this patient 3 days if any change or worsen contact me immediately call if any problems

## 2021-03-31 DIAGNOSIS — Z23 ENCOUNTER FOR IMMUNIZATION: ICD-10-CM

## 2021-04-01 ENCOUNTER — TELEMEDICINE (OUTPATIENT)
Dept: INTERNAL MEDICINE CLINIC | Facility: CLINIC | Age: 51
End: 2021-04-01
Payer: COMMERCIAL

## 2021-04-01 VITALS — WEIGHT: 150 LBS | BODY MASS INDEX: 27.6 KG/M2 | HEIGHT: 62 IN

## 2021-04-01 DIAGNOSIS — U07.1 COVID-19: Primary | ICD-10-CM

## 2021-04-01 PROCEDURE — 99442 PR PHYS/QHP TELEPHONE EVALUATION 11-20 MIN: CPT | Performed by: NURSE PRACTITIONER

## 2021-04-01 RX ORDER — AZITHROMYCIN 250 MG/1
TABLET, FILM COATED ORAL
Qty: 6 TABLET | Refills: 0 | Status: SHIPPED | OUTPATIENT
Start: 2021-04-01 | End: 2021-04-05

## 2021-04-01 NOTE — PROGRESS NOTES
COVID-19 Outpatient Progress Note    Assessment/Plan:    Problem List Items Addressed This Visit        Other    COVID-19 - Primary    Relevant Medications    azithromycin (ZITHROMAX) 250 mg tablet         Disposition:     I recommended continued isolation until at least 24 hours have passed since recovery defined as resolution of fever without the use of fever-reducing medications AND improvement in COVID symptoms AND 10 days have passed since onset of symptoms (or 10 days have passed since date of first positive viral diagnostic test for asymptomatic patients)  I have spent 15 minutes directly with the patient  Greater than 50% of this time was spent in counseling/coordination of care regarding: prognosis, instructions for management and impressions  Encounter provider Rosemarie Helm, [facility]    Provider located at [address] [address] [phone]    Recent Visits  Date Type Provider Dept   03/29/21 [id number] DO Vanessa Manzano    03/26/21 Telemedicine Sybil Steven recent visits within past 7 days and meeting all other requirements     Today's Visits  Date Type Provider Dept   04/01/21 Telemedicine Sybil Gibson today's visits and meeting all other requirements     Future Appointments  No visits were found meeting these conditions  Showing future appointments within next 150 days and meeting all other requirements        Patient agrees to participate in a virtual check in via telephone or video visit instead of presenting to the office to address urgent/immediate medical needs  Patient is aware this is a billable service  After connecting through Telephone, the patient was identified by name and date of birth   Silvio Vazquez was informed that this was a telemedicine visit and that the exam was being conducted confidentially over secure lines  My office door was closed  No one else was in the room  Derrell Milner acknowledged consent and understanding of privacy and security of the telemedicine visit  I informed the patient that I have reviewed her record in Epic and presented the opportunity for her to ask any questions regarding the visit today  The patient agreed to participate  It was my intent to perform this visit via video technology but the patient was not able to do a video connection so the visit was completed via audio telephone only  Subjective:   Derrell Milner is a 53 y o  female who has been screened for COVID-19  Symptom change since last report: improving  Patient's symptoms include fatigue, cough and myalgias  Patient denies fever, chills, malaise, congestion, rhinorrhea, sore throat, anosmia, loss of taste, shortness of breath, chest tightness, abdominal pain, nausea, vomiting, diarrhea and headaches  oJan Tirado has been staying home and has isolated themselves in her home  She is taking care to not share personal items and is cleaning all surfaces that are touched often, like counters, tabletops, and doorknobs using household cleaning sprays or wipes  She is wearing a mask when she leaves her room  Lab Results   Component Value Date    SARSCOV2 Positive (A) 2021     No past medical history on file  Past Surgical History:   Procedure Laterality Date    BIOPSY CORE NEEDLE Right 01/10/2017    BIOPSY BREAST PERCUTANTOUS NEEDLE CORE    BREAST BIOPSY Right 01/10/2017     biopsy benign     SECTION      DESC: 3/2000,10/2001,10/2005,2007     SECTION      [place] COLONOSCOPY FLX DX W/COLLJ SPEC WHEN PFRMD N/A 2018    Procedure: COLONOSCOPY;  Surgeon: Bre Daugherty MD;  [facility];   Service: Gastroenterology    TUBAL LIGATION      US GUIDED BREAST BIOPSY RIGHT COMPLETE Right 1/10/2017    WISDOM TOOTH EXTRACTION       Current Outpatient Medications   Medication Sig Dispense Refill    levothyroxine 50 mcg tablet TAKE 1 TABLET DAILY 90 tablet 3    azithromycin (ZITHROMAX) 250 mg tablet Take 2 tablets today then 1 tablet daily x 4 days 6 tablet 0     No current facility-administered medications for this visit  No Known Allergies    Review of Systems   Constitutional: Positive for fatigue  Negative for chills and fever  HENT: Negative for congestion, rhinorrhea and sore throat  Respiratory: Positive for cough  Negative for chest tightness and shortness of breath  Gastrointestinal: Negative for abdominal pain, diarrhea, nausea and vomiting  Musculoskeletal: Positive for myalgias  Neurological: Negative for headaches  Objective:    Vitals:    04/01/21 1502   Weight: 68 kg (150 lb)   Height: 5' 2" (1 575 m)       VIRTUAL VISIT DISCLAIMER    Elsy Garcia acknowledges that she has consented to an online visit or consultation  She understands that the online visit is based solely on information provided by her, and that, in the absence of a face-to-face physical evaluation by the physician, the diagnosis she receives is both limited and provisional in terms of accuracy and completeness  This is not intended to replace a full medical face-to-face evaluation by the physician  Elsy Garcia understands and accepts these terms

## 2021-04-05 ENCOUNTER — TELEMEDICINE (OUTPATIENT)
Dept: INTERNAL MEDICINE CLINIC | Facility: CLINIC | Age: 51
End: 2021-04-05
Payer: COMMERCIAL

## 2021-04-05 DIAGNOSIS — U07.1 COVID-19: Primary | ICD-10-CM

## 2021-04-05 PROCEDURE — 99442 PR PHYS/QHP TELEPHONE EVALUATION 11-20 MIN: CPT | Performed by: NURSE PRACTITIONER

## 2021-04-05 NOTE — PROGRESS NOTES
COVID-19 Outpatient Progress Note    Assessment/Plan:    Problem List Items Addressed This Visit     None         Disposition:     I recommended continued isolation until at least 24 hours have passed since recovery defined as resolution of fever without the use of fever-reducing medications AND improvement in COVID symptoms AND 10 days have passed since onset of symptoms (or 10 days have passed since date of first positive viral diagnostic test for asymptomatic patients)  I have spent 15 minutes directly with the patient  Greater than 50% of this time was spent in counseling/coordination of care regarding: prognosis, instructions for management, patient and family education and impressions  [facility]    Provider located at [address] [address] [phone]    Recent Visits  Date Type Provider Dept   04/01/21 Telemedicine Fermin Finney [address] [place] [address]   03/29/21 Telemedicine Adriana Siddiqui, [address] [place] [address] recent visits within past 7 days and meeting all other requirements     Today's Visits  Date Type Provider Dept   [facility] visits and meeting all other requirements     Future Appointments  No visits were found meeting these conditions  Showing future appointments within next 150 days and meeting all other requirements        Patient agrees to participate in a virtual check in via telephone or video visit instead of presenting to the office to address urgent/immediate medical needs  Patient is aware this is a billable service  After connecting through Telephone, the patient was identified by name and date of birth  Alie Caputo was informed that this was a telemedicine visit and that the exam was being conducted confidentially over secure lines  My office door was closed  No one else was in the room  Kelli Seymour acknowledged consent and understanding of privacy and security of the telemedicine visit  I informed the patient that I have reviewed her record in Epic and presented the opportunity for her to ask any questions regarding the visit today  The patient agreed to participate  It was my intent to perform this visit via video technology but the patient was not able to do a video connection so the visit was completed via audio telephone only  Subjective:   Kelli Seymour is a 53 y o  female who has been screened for COVID-19  Symptom change since last report: resolving  Patient's symptoms include fatigue and nasal congestion  Patient denies fever, chills, malaise, rhinorrhea, sore throat, anosmia, loss of taste, cough, shortness of breath, chest tightness, abdominal pain, nausea, vomiting, diarrhea, myalgias and headaches  Nayeli Xie has been staying home and has isolated themselves in her home  She is taking care to not share personal items and is cleaning all surfaces that are touched often, like counters, tabletops, and doorknobs using household cleaning sprays or wipes  She is wearing a mask when she leaves her room  Date of symptom onset: 3/24/2021  Date of positive COVID-19 PCR: 3/26/2021    Winded on exertion    Lab Results   Component Value Date    SARSCOV2 Positive (A) 2021     History reviewed  No pertinent past medical history  Past Surgical History:   Procedure Laterality Date    BIOPSY CORE NEEDLE Right 01/10/2017    BIOPSY BREAST PERCUTANTOUS NEEDLE CORE    BREAST BIOPSY Right 01/10/2017     biopsy benign     SECTION      DESC: 3/2000,10/2001,10/2005,2007     SECTION      [place] COLONOSCOPY FLX DX W/COLLJ SPEC WHEN PFRMD N/A 2018    Procedure: COLONOSCOPY;  Surgeon: Fransisco Piper MD;  [facility];   Service: Gastroenterology    TUBAL LIGATION      US GUIDED BREAST BIOPSY RIGHT COMPLETE Right 1/10/2017    WISDOM TOOTH EXTRACTION Current Outpatient Medications   Medication Sig Dispense Refill    azithromycin (ZITHROMAX) 250 mg tablet Take 2 tablets today then 1 tablet daily x 4 days 6 tablet 0    levothyroxine 50 mcg tablet TAKE 1 TABLET DAILY 90 tablet 3     No current facility-administered medications for this visit  No Known Allergies    Review of Systems   Constitutional: Positive for fatigue  Negative for chills and fever  HENT: Positive for congestion  Negative for rhinorrhea and sore throat  Eyes: Negative  Respiratory: Negative for cough, chest tightness and shortness of breath  Cardiovascular: Negative  Gastrointestinal: Negative  Negative for abdominal pain, diarrhea, nausea and vomiting  Musculoskeletal: Negative  Negative for myalgias  Neurological: Negative  Negative for headaches  Objective: There were no vitals filed for this visit  VIRTUAL VISIT DISCLAIMER    Elsy Garcia acknowledges that she has consented to an online visit or consultation  She understands that the online visit is based solely on information provided by her, and that, in the absence of a face-to-face physical evaluation by the physician, the diagnosis she receives is both limited and provisional in terms of accuracy and completeness  This is not intended to replace a full medical face-to-face evaluation by the physician  Elsy Garcia understands and accepts these terms

## 2021-04-07 ENCOUNTER — TELEMEDICINE (OUTPATIENT)
Dept: INTERNAL MEDICINE CLINIC | Facility: CLINIC | Age: 51
End: 2021-04-07
Payer: COMMERCIAL

## 2021-04-07 VITALS — HEIGHT: 62 IN | BODY MASS INDEX: 27.44 KG/M2

## 2021-04-07 DIAGNOSIS — U07.1 COVID-19: Primary | ICD-10-CM

## 2021-04-07 PROCEDURE — 99442 PR PHYS/QHP TELEPHONE EVALUATION 11-20 MIN: CPT | Performed by: NURSE PRACTITIONER

## 2021-04-07 NOTE — PROGRESS NOTES
COVID-19 Outpatient Progress Note    Assessment/Plan:    Problem List Items Addressed This Visit        Other    COVID-19 - Primary         Disposition:     I recommended continued isolation until at least 24 hours have passed since recovery defined as resolution of fever without the use of fever-reducing medications AND improvement in COVID symptoms AND 10 days have passed since onset of symptoms (or 10 days have passed since date of first positive viral diagnostic test for asymptomatic patients)  I have spent 15 minutes directly with the patient  Greater than 50% of this time was spent in counseling/coordination of care regarding: prognosis and instructions for management  Encounter provider Sami Langley    Provider located at [address] [place] [phone]    Recent Visits  Date Type Provider Dept   04/05/21 Telemedicine Dora Flannery [address] [place] [address]   04/01/21 Telemedicine Sybil Yusuf recent visits within past 7 days and meeting all other requirements     Today's Visits  Date Type Provider Dept   04/07/21 Telemedicine Sybil Langley today's visits and meeting all other requirements     Future Appointments  No visits were found meeting these conditions  Showing future appointments within next 150 days and meeting all other requirements        Patient agrees to participate in a virtual check in via telephone or video visit instead of presenting to the office to address urgent/immediate medical needs  Patient is aware this is a billable service  After connecting through Telephone, the patient was identified by name and date of birth  Leslie Dodd was informed that this was a telemedicine visit and that the exam was being conducted confidentially over secure lines  My office door was closed  No one else was in the room   Leslie Dodd acknowledged consent and understanding of privacy and security of the telemedicine visit  I informed the patient that I have reviewed her record in Epic and presented the opportunity for her to ask any questions regarding the visit today  The patient agreed to participate  It was my intent to perform this visit via video technology but the patient was not able to do a video connection so the visit was completed via audio telephone only  Subjective:   Trung Chi is a 53 y o  female who has been screened for COVID-19  Symptom change since last report: resolving  Patient is currently asymptomatic  Patient denies fever, chills, fatigue, malaise, congestion, rhinorrhea, sore throat, anosmia, loss of taste, cough, shortness of breath, chest tightness, abdominal pain, nausea, vomiting, diarrhea, myalgias and headaches  Colin Balderrama has been staying home and has isolated themselves in her home  She is taking care to not share personal items and is cleaning all surfaces that are touched often, like counters, tabletops, and doorknobs using household cleaning sprays or wipes  She is wearing a mask when she leaves her room  Lab Results   Component Value Date    SARSCOV2 Positive (A) 2021     No past medical history on file  Past Surgical History:   Procedure Laterality Date    BIOPSY CORE NEEDLE Right 01/10/2017    BIOPSY BREAST PERCUTANTOUS NEEDLE CORE    BREAST BIOPSY Right 01/10/2017     biopsy benign     SECTION      DESC: 3/2000,10/2001,10/2005,2007     SECTION      [place] COLONOSCOPY FLX DX W/COLLJ SPEC WHEN PFRMD N/A 2018    Procedure: COLONOSCOPY;  Surgeon: Larissa hWitley MD;  [facility];   Service: Gastroenterology    TUBAL LIGATION      US GUIDED BREAST BIOPSY RIGHT COMPLETE Right 1/10/2017    WISDOM TOOTH EXTRACTION       Current Outpatient Medications   Medication Sig Dispense Refill    levothyroxine 50 mcg tablet TAKE 1 TABLET DAILY 90 tablet 3     No current facility-administered medications for this visit  No Known Allergies    Review of Systems   Constitutional: Negative  Negative for chills, fatigue and fever  HENT: Negative  Negative for congestion, rhinorrhea and sore throat  Eyes: Negative  Respiratory: Negative  Negative for cough, chest tightness and shortness of breath  Cardiovascular: Negative  Gastrointestinal: Negative  Negative for abdominal pain, diarrhea, nausea and vomiting  Musculoskeletal: Negative  Negative for myalgias  Neurological: Negative  Negative for headaches  Objective:    Vitals:    04/07/21 0948   Height: 5' 2" (1 575 m)     VIRTUAL VISIT DISCLAIMER    Elsy Garcia acknowledges that she has consented to an online visit or consultation  She understands that the online visit is based solely on information provided by her, and that, in the absence of a face-to-face physical evaluation by the physician, the diagnosis she receives is both limited and provisional in terms of accuracy and completeness  This is not intended to replace a full medical face-to-face evaluation by the physician  Elsy Garcia understands and accepts these terms

## 2021-05-16 ENCOUNTER — IMMUNIZATIONS (OUTPATIENT)
Dept: FAMILY MEDICINE CLINIC | Facility: HOSPITAL | Age: 51
End: 2021-05-16

## 2021-05-16 DIAGNOSIS — Z23 ENCOUNTER FOR IMMUNIZATION: Primary | ICD-10-CM

## 2021-05-16 PROCEDURE — 91300 SARS-COV-2 / COVID-19 MRNA VACCINE (PFIZER-BIONTECH) 30 MCG: CPT

## 2021-05-16 PROCEDURE — 0001A SARS-COV-2 / COVID-19 MRNA VACCINE (PFIZER-BIONTECH) 30 MCG: CPT

## 2021-06-05 ENCOUNTER — IMMUNIZATIONS (OUTPATIENT)
Dept: FAMILY MEDICINE CLINIC | Facility: HOSPITAL | Age: 51
End: 2021-06-05

## 2021-06-05 DIAGNOSIS — Z23 ENCOUNTER FOR IMMUNIZATION: Primary | ICD-10-CM

## 2021-06-05 PROCEDURE — 91300 SARS-COV-2 / COVID-19 MRNA VACCINE (PFIZER-BIONTECH) 30 MCG: CPT

## 2021-06-05 PROCEDURE — 0002A SARS-COV-2 / COVID-19 MRNA VACCINE (PFIZER-BIONTECH) 30 MCG: CPT

## 2021-06-10 ENCOUNTER — CONSULT (OUTPATIENT)
Dept: SURGICAL ONCOLOGY | Facility: CLINIC | Age: 51
End: 2021-06-10
Payer: COMMERCIAL

## 2021-06-10 ENCOUNTER — TELEPHONE (OUTPATIENT)
Dept: SURGICAL ONCOLOGY | Facility: CLINIC | Age: 51
End: 2021-06-10

## 2021-06-10 VITALS
WEIGHT: 151 LBS | DIASTOLIC BLOOD PRESSURE: 78 MMHG | BODY MASS INDEX: 27.79 KG/M2 | TEMPERATURE: 98 F | HEIGHT: 62 IN | SYSTOLIC BLOOD PRESSURE: 110 MMHG | HEART RATE: 78 BPM

## 2021-06-10 DIAGNOSIS — N63.20 LEFT BREAST MASS: Primary | ICD-10-CM

## 2021-06-10 DIAGNOSIS — Z71.9 ENCOUNTER FOR CONSULTATION: ICD-10-CM

## 2021-06-10 PROBLEM — D24.1 FIBROADENOMA OF BREAST, RIGHT: Status: ACTIVE | Noted: 2021-06-10

## 2021-06-10 PROBLEM — D24.1 FIBROADENOMA OF BREAST, RIGHT: Status: RESOLVED | Noted: 2021-06-10 | Resolved: 2021-06-10

## 2021-06-10 PROCEDURE — 1036F TOBACCO NON-USER: CPT | Performed by: SURGERY

## 2021-06-10 PROCEDURE — 3008F BODY MASS INDEX DOCD: CPT | Performed by: SURGERY

## 2021-06-10 PROCEDURE — 99204 OFFICE O/P NEW MOD 45 MIN: CPT | Performed by: SURGERY

## 2021-06-10 NOTE — TELEPHONE ENCOUNTER
Called and spoke to patient in regards to Surgery scheduled for Thursday 9/23/2021 at the [facility] with Dr Hannah Cotto  Patient aware I am sending out a fed [employer] envelope with all her pre op information,  instructions and appointments  She was driving at time of call and states she will look over the information once she receives it and call if she has any questions

## 2021-06-10 NOTE — PROGRESS NOTES
Breast Consultation-Surgical Oncology     [address] [facility],[address]  [facility]  [place] [facility] [address]    Name:  Saintclair Old  YOB: 1970  MRN:  [id number]    Assessment/Plan   Diagnoses and all orders for this visit:    Left breast mass    Encounter for consultation  -     Ambulatory referral to Surgical Oncology    Other orders  -     ceFAZolin (ANCEF) 1,000 mg in dextrose 5 % 100 mL IVPB            HPI: Saintclair Old is a 53y o  year old female who presents with  An increasing mass in the far upper outer left breast   She would like to have this surgically excised  She has a history of fibrocystic changes and a benign fibroadenoma of the right breast   She denies any family history of breast cancer  Surgical treatment to date consisted of   Not applicable  Oncology History:    Oncology History    No history exists         Pertinent reproductive history:  Age at menarche:    OB History        5    Para   4    Term   4            AB   1    Living   4       SAB   1    TAB        Ectopic        Multiple        Live Births   4           Obstetric Comments   1ST PREGNANCY AT 29  Menarche-                 Problem List:   Patient Active Problem List   Diagnosis    Wellness examination    Family history of colon cancer    Vision disturbance    Screening for cardiovascular condition    Edema    Hypothyroidism    Elevated brain natriuretic peptide (BNP) level    Perimenopausal symptom    COVID-19    Left breast mass     Past Medical History:   Diagnosis Date    Disease of thyroid gland      Past Surgical History:   Procedure Laterality Date    BIOPSY CORE NEEDLE Right 01/10/2017    BIOPSY BREAST PERCUTANTOUS NEEDLE CORE    BREAST BIOPSY Right 01/10/2017     biopsy benign     SECTION      DESC: 3/2000,10/2001,10/2005,2007     SECTION      [place] COLONOSCOPY FLX DX W/COLLJ SPEC WHEN PFRMD N/A 9/12/2018    Procedure: COLONOSCOPY;  Surgeon: Randolph Isidro MD;  [facility]; Service: Gastroenterology    TUBAL LIGATION      US GUIDED BREAST BIOPSY RIGHT COMPLETE Right 1/10/2017    WISDOM TOOTH EXTRACTION       Family History   Problem Relation Age of Onset    Coronary artery disease Mother     Heart attack Mother     No Known Problems Father     Colon cancer Maternal Aunt         ? 29's    No Known Problems Daughter     No Known Problems Maternal Grandmother     No Known Problems Maternal Grandfather     No Known Problems Paternal Grandmother     No Known Problems Paternal Grandfather     No Known Problems Maternal Aunt     No Known Problems Paternal Aunt     Prostate cancer Maternal Uncle         66's     Social History     Socioeconomic History    Marital status: /Civil Union     Spouse name: Not on file    Number of children: Not on file    Years of education: 2ND YEAR COLLEGE    Highest education level: Not on file   Occupational History    Occupation: HOMEMAKER   Social Needs    Financial resource strain: Not on file    Food insecurity     Worry: Not on file     Inability: Not on file    Transportation needs     Medical: Not on file     Non-medical: Not on file   Tobacco Use    Smoking status: Never Smoker    Smokeless tobacco: Never Used   Substance and Sexual Activity    Alcohol use: Yes     Frequency: 2-3 times a week     Drinks per session: 1 or 2     Binge frequency: Never     Comment: 2-3 per month    Drug use: No    Sexual activity: Yes     Comment: USES BIRTH CONTROL   Lifestyle    Physical activity     Days per week: Not on file     Minutes per session: Not on file    Stress: Not on file   Relationships    Social connections     Talks on phone: Not on file     Gets together: Not on file     Attends Islam service: Not on file     Active member of club or organization: Not on file     Attends meetings of clubs or organizations: Not on file Relationship status: Not on file    Intimate partner violence     Fear of current or ex partner: Not on file     Emotionally abused: Not on file     Physically abused: Not on file     Forced sexual activity: Not on file   Other Topics Concern    Not on file   Social History Narrative    DAILY COFFEE CONSUMPTION 2 CUPS A DAY    DENIED---- HOME ENVIRONMENT DOMESTIC VIOLENCE    LIVING INDEPENDENTLY WITH SPOUSE     Current Outpatient Medications   Medication Sig Dispense Refill    levothyroxine 50 mcg tablet TAKE 1 TABLET DAILY 90 tablet 3     No current facility-administered medications for this visit  No Known Allergies      The following portions of the patient's history were reviewed and updated as appropriate: allergies, current medications, past family history, past medical history, past social history, past surgical history and problem list     Review of Systems:  Review of Systems   Constitutional: Negative  Negative for appetite change and fever  Eyes: Negative  Respiratory: Negative for shortness of breath  Cardiovascular: Negative  Gastrointestinal: Negative  Endocrine: Negative  Genitourinary: Negative  Musculoskeletal: Negative  Negative for arthralgias and myalgias  Skin: Negative  Allergic/Immunologic: Negative  Neurological: Negative  Hematological: Negative  Negative for adenopathy  Does not bruise/bleed easily  Psychiatric/Behavioral: Negative  Physical Exam:  Physical Exam  Constitutional:       General: She is not in acute distress  Appearance: She is well-developed  HENT:      Head: Normocephalic and atraumatic  Cardiovascular:      Heart sounds: Normal heart sounds  Pulmonary:      Breath sounds: Normal breath sounds  Chest:      Breasts:         Right: No inverted nipple, mass, nipple discharge, skin change or tenderness  Left: Mass ( visible and soft palpable far upper outer) present   No inverted nipple, nipple discharge, skin change or tenderness  Abdominal:      Palpations: Abdomen is soft  Musculoskeletal:      Right lower leg: No edema  Left lower leg: No edema  Lymphadenopathy:      Upper Body:      Right upper body: No supraclavicular, axillary or pectoral adenopathy  Left upper body: No supraclavicular, axillary or pectoral adenopathy  Neurological:      Mental Status: She is alert and oriented to person, place, and time  Psychiatric:         Mood and Affect: Mood normal          Laboratory:   01/10/2017 core biopsy right breast 1000 hours revealed a benign fibroadenoma which was concordant with her imaging        [id number] bilateral 3D screening mammogram is benign BI-RADS one with a density of two          Discussion/Summary: 81-year-old female with a history of fibrocystic changes and a benign fibroadenoma of the right breast   She was seen previously by me in  for these issues  She now presents with an increasing mass in the far upper left breast / chest wall  This is visible and palpable  It is soft in nature and is likely a benign lipoma  She would like to have this surgically excised  I reviewed this procedure with her  All of her questions were answered today  Consent was signed in the office  She would like to have surgery early  secondary to restrictions in the summer with sun exposure, swimming and lifting/activity  We will plan for surgery in September    She understands that she will need to return for an updated history and physical

## 2021-08-05 ENCOUNTER — OFFICE VISIT (OUTPATIENT)
Dept: INTERNAL MEDICINE CLINIC | Facility: CLINIC | Age: 51
End: 2021-08-05
Payer: COMMERCIAL

## 2021-08-05 VITALS
SYSTOLIC BLOOD PRESSURE: 118 MMHG | HEART RATE: 55 BPM | OXYGEN SATURATION: 98 % | DIASTOLIC BLOOD PRESSURE: 70 MMHG | RESPIRATION RATE: 16 BRPM | HEIGHT: 62 IN | WEIGHT: 154 LBS | BODY MASS INDEX: 28.34 KG/M2

## 2021-08-05 DIAGNOSIS — Z11.59 NEED FOR HEPATITIS C SCREENING TEST: Primary | ICD-10-CM

## 2021-08-05 DIAGNOSIS — E03.9 HYPOTHYROIDISM, UNSPECIFIED TYPE: ICD-10-CM

## 2021-08-05 DIAGNOSIS — Z00.00 WELLNESS EXAMINATION: ICD-10-CM

## 2021-08-05 PROCEDURE — 99396 PREV VISIT EST AGE 40-64: CPT | Performed by: INTERNAL MEDICINE

## 2021-08-05 PROCEDURE — 3725F SCREEN DEPRESSION PERFORMED: CPT | Performed by: INTERNAL MEDICINE

## 2021-08-05 PROCEDURE — 3008F BODY MASS INDEX DOCD: CPT | Performed by: INTERNAL MEDICINE

## 2021-08-05 PROCEDURE — 1036F TOBACCO NON-USER: CPT | Performed by: INTERNAL MEDICINE

## 2021-08-05 NOTE — PROGRESS NOTES
BMI Counseling: Body mass index is 28 17 kg/m²  The BMI is above normal  Nutrition recommendations include decreasing portion sizes, encouraging healthy choices of fruits and vegetables, moderation in carbohydrate intake and reducing intake of saturated and trans fat  Exercise recommendations include moderate physical activity 150 minutes/week  No pharmacotherapy was ordered  INTERNAL MEDICINE HEALTH MAINTENANCE OFFICE VISIT  [facility]    NAME: Elsy Garcia  AGE: 49 y o  SEX: female  : 1970     DATE: 2021     Assessment and Plan:     Hypothyroidism  On levothyroxine 50 mg   Recheck TSH     Wellness examination  Overall the patient is clinically stable and doing well, we encouraged the patient to follow a healthy and balanced diet  Recommended that the patient exercise routinely approximately 3 times minutes 5 times per week  Reviewed the patient's vaccines and have made recommendations for updates if necessary and will flu vaccine for which patient agreed  Discussed with her about Shingrix, she will call her insurance company to check for coverage  We will be ordering screening laboratories which are age appropriate  1  Patient Counseling:  · Nutrition: Stressed importance of moderation in sodium/caffeine intake, saturated fat, trans fat and cholesterol  Discussed portion control as well as increasing intake of fruits, vegetables, lean protein, and fish  · Exercise: Stressed the importance of regular exercise at least 150 mins/week  · Injury prevention: Discussed safety belts, safety helmets, smoke detector, smoking near bedding or upholstery  · Dental health: Discussed importance of regular tooth brushing, flossing, and dental visits  · Immunizations reviewed  · Discussed benefits of screening   · Education was printed for patient    2  Discussed the patient's BMI with her    The BMI is above average; BMI management plan is completed     No follow-ups on file  Chief Complaint:     Chief Complaint   Patient presents with    Annual Exam        History of Present Illness: Well Adult Physical   Patient here for a comprehensive physical exam The patient reports problems - hot flashes, weight gain     Diet and Physical Activity  Diet: well balanced diet  Weight concerns: Patient is overweight (BMI 25 0-29  9)  Exercise: daily      Depression Screen  Negative for depression with PHQ2 score of 0  General Health  Hearing: Normal:  bilateral  Vision: goes for regular eye exams  Dental: regular dental visits    Reproductive Health   perimenopause     The following portions of the patient's history were reviewed and updated as appropriate: allergies, current medications, past family history, past medical history, past social history, past surgical history and problem list      Review of Systems:     Review of Systems   Constitutional: Negative for activity change, fatigue and unexpected weight change  Respiratory: Negative for cough, chest tightness, shortness of breath and wheezing  Cardiovascular: Negative for chest pain and leg swelling  Gastrointestinal: Negative for blood in stool, constipation and diarrhea  Genitourinary: Negative for hematuria, vaginal bleeding and vaginal discharge  Psychiatric/Behavioral: Positive for sleep disturbance  All other systems reviewed and are negative         Past Medical History:     Past Medical History:   Diagnosis Date    Disease of thyroid gland         Past Surgical History:     Past Surgical History:   Procedure Laterality Date    BIOPSY CORE NEEDLE Right 01/10/2017    BIOPSY BREAST PERCUTANTOUS NEEDLE CORE    BREAST BIOPSY Right 01/10/2017     biopsy benign     SECTION      DESC: 3/2000,10/2001,10/2005,2007     SECTION      [place] COLONOSCOPY FLX DX W/COLLJ SPEC WHEN PFRMD N/A 2018    Procedure: COLONOSCOPY;  Surgeon: Wisam Robison MD;  [facility];  Service: Gastroenterology    TUBAL LIGATION      US GUIDED BREAST BIOPSY RIGHT COMPLETE Right 1/10/2017    WISDOM TOOTH EXTRACTION          Social History:     Social History     Socioeconomic History    Marital status: /Civil Union     Spouse name: None    Number of children: None    Years of education: 2ND YEAR COLLEGE    Highest education level: None   Occupational History    Occupation: HOMEMAKER   Tobacco Use    Smoking status: Never Smoker    Smokeless tobacco: Never Used   Substance and Sexual Activity    Alcohol use: Yes     Comment: 2-3 per month    Drug use: No    Sexual activity: Yes     Comment: USES BIRTH CONTROL   Other Topics Concern    None   Social History Narrative    DAILY COFFEE CONSUMPTION 2 CUPS A DAY    DENIED---- HOME ENVIRONMENT DOMESTIC VIOLENCE    LIVING INDEPENDENTLY WITH SPOUSE     Social Determinants of Health     Financial Resource Strain:     Difficulty of Paying Living Expenses:    Food Insecurity:     Worried About Running Out of Food in the Last Year:     Ran Out of Food in the Last Year:    Transportation Needs:     Lack of Transportation (Medical):  Lack of Transportation (Non-Medical):    Physical Activity:     Days of Exercise per Week:     Minutes of Exercise per Session:    Stress:     Feeling of Stress :    Social Connections:     Frequency of Communication with Friends and Family:     Frequency of Social Gatherings with Friends and Family:     Attends Confucianist Services:     Active Member of Clubs or Organizations:     Attends Club or Organization Meetings:     Marital Status:    Intimate Partner Violence:     Fear of Current or Ex-Partner:     Emotionally Abused:     Physically Abused:     Sexually Abused:         Family History:     Family History   Problem Relation Age of Onset    Coronary artery disease Mother     Heart attack Mother     No Known Problems Father     Colon cancer Maternal Aunt         ? 30's    No Known Problems Daughter     No Known Problems Maternal Grandmother     No Known Problems Maternal Grandfather     No Known Problems Paternal Grandmother     No Known Problems Paternal Grandfather     No Known Problems Maternal Aunt     No Known Problems Paternal Aunt     Prostate cancer Maternal Uncle         70's        Current Medications:     Outpatient Medications Prior to Visit   Medication Sig Dispense Refill    levothyroxine 50 mcg tablet TAKE 1 TABLET DAILY 90 tablet 3     No facility-administered medications prior to visit  Allergies:     No Known Allergies     Objective:         Physical Exam  Vitals reviewed  Constitutional:       General: She is not in acute distress  Appearance: She is not ill-appearing, toxic-appearing or diaphoretic  HENT:      Head: Normocephalic  Mouth/Throat:      Mouth: Mucous membranes are moist    Eyes:      General: No scleral icterus  Right eye: No discharge  Left eye: No discharge  Conjunctiva/sclera: Conjunctivae normal    Cardiovascular:      Rate and Rhythm: Normal rate and regular rhythm  Heart sounds: Normal heart sounds  No murmur heard  No friction rub  Pulmonary:      Effort: No respiratory distress  Breath sounds: Normal breath sounds  No wheezing, rhonchi or rales  Abdominal:      General: There is no distension  Palpations: Abdomen is soft  Tenderness: There is no abdominal tenderness  There is no guarding or rebound  Musculoskeletal:      Cervical back: Normal range of motion  No tenderness  Right lower leg: No edema  Left lower leg: No edema  Skin:     General: Skin is warm  Neurological:      Mental Status: She is alert and oriented to person, place, and time  Psychiatric:         Mood and Affect: Mood normal          Behavior: Behavior normal          Thought Content:  Thought content normal          Judgment: Judgment normal          Data:    Laboratory Results: I have personally reviewed the pertinent laboratory results/reports   Radiology/Other Diagnostic Testing Results: I have personally reviewed pertinent reports         Health Maintenance:     Health Maintenance   Topic Date Due    Hepatitis C Screening  Never done    BMI: Followup Plan  10/22/2020    Influenza Vaccine (1) 09/01/2021    Annual Physical  12/07/2021    Breast Cancer Screening: Mammogram  02/06/2022    Depression Screening PHQ  08/05/2022    BMI: Adult  08/05/2022    DTaP,Tdap,and Td Vaccines (2 - Td or Tdap) 07/14/2024    Cervical Cancer Screening  12/07/2025    Colorectal Cancer Screening  09/12/2028    HIV Screening  Completed    COVID-19 Vaccine  Completed    Pneumococcal Vaccine: Pediatrics (0 to 5 Years) and At-Risk Patients (6 to 59 Years)  Aged Out    HIB Vaccine  Aged Out    Hepatitis B Vaccine  Aged Out    IPV Vaccine  Aged Out    Hepatitis A Vaccine  Aged Out    Meningococcal ACWY Vaccine  Aged Out    HPV Vaccine  Aged [employer] History   Administered Date(s) Administered    Influenza Quadrivalent Preservative Free 3 years and older IM 10/04/2014, 10/07/2015, 10/07/2017    Influenza, high dose seasonal 0 7 mL 10/12/2019    Influenza, injectable, quadrivalent, preservative free 0 5 mL 10/22/2020    Influenza, recombinant, quadrivalent,injectable, preservative free 11/09/2018    Influenza, seasonal, injectable 01/24/2014    SARS-CoV-2 / COVID-19 mRNA IM (Pfizer-BioNTech) 05/16/2021, 06/05/2021    Td (adult), adsorbed 1970    Tdap 07/14/2014       Nutrition Assessment and Intervention:     Reviewed food recall journal    Recommended completion of food recall journal      Physical Activity Assessment and Intervention:    Activity journal reviewed    Activity journal completion recommended      Emotional and Mental Well-being, Sleep, Connectedness Assessment and Intervention:    Sleep/stress assessment performed    Depression and anxiety screening performed and reviewed        Kei Bennett MD  [facility] [address]

## 2021-08-05 NOTE — PATIENT INSTRUCTIONS
Black Cohosh - for hot flashes   [employer]- black for calory count   shingrix vaccine- ask your insurance about coverage

## 2021-08-05 NOTE — ASSESSMENT & PLAN NOTE
Overall the patient is clinically stable and doing well, we encouraged the patient to follow a healthy and balanced diet  Recommended that the patient exercise routinely approximately 3 times minutes 5 times per week  Reviewed the patient's vaccines and have made recommendations for updates if necessary and will flu vaccine for which patient agreed  Discussed with her about Shingrix, she will call her insurance company to check for coverage  We will be ordering screening laboratories which are age appropriate

## 2021-08-06 ENCOUNTER — APPOINTMENT (OUTPATIENT)
Dept: LAB | Facility: CLINIC | Age: 51
End: 2021-08-06
Payer: COMMERCIAL

## 2021-08-06 DIAGNOSIS — Z11.59 NEED FOR HEPATITIS C SCREENING TEST: ICD-10-CM

## 2021-08-06 DIAGNOSIS — E03.9 HYPOTHYROIDISM, UNSPECIFIED TYPE: ICD-10-CM

## 2021-08-06 DIAGNOSIS — Z00.00 WELLNESS EXAMINATION: ICD-10-CM

## 2021-08-06 LAB
ALBUMIN SERPL BCP-MCNC: 4.2 G/DL (ref 3.5–5)
ALP SERPL-CCNC: 70 U/L (ref 46–116)
ALT SERPL W P-5'-P-CCNC: 21 U/L (ref 12–78)
ANION GAP SERPL CALCULATED.3IONS-SCNC: 8 MMOL/L (ref 4–13)
AST SERPL W P-5'-P-CCNC: 20 U/L (ref 5–45)
BASOPHILS # BLD AUTO: 0.02 THOUSANDS/ΜL (ref 0–0.1)
BASOPHILS NFR BLD AUTO: 1 % (ref 0–1)
BILIRUB SERPL-MCNC: 0.69 MG/DL (ref 0.2–1)
BUN SERPL-MCNC: 16 MG/DL (ref 5–25)
CALCIUM SERPL-MCNC: 9.3 MG/DL (ref 8.3–10.1)
CHLORIDE SERPL-SCNC: 104 MMOL/L (ref 100–108)
CHOLEST SERPL-MCNC: 187 MG/DL (ref 50–200)
CO2 SERPL-SCNC: 28 MMOL/L (ref 21–32)
CREAT SERPL-MCNC: 0.81 MG/DL (ref 0.6–1.3)
EOSINOPHIL # BLD AUTO: 0.08 THOUSAND/ΜL (ref 0–0.61)
EOSINOPHIL NFR BLD AUTO: 2 % (ref 0–6)
ERYTHROCYTE [DISTWIDTH] IN BLOOD BY AUTOMATED COUNT: 12.2 % (ref 11.6–15.1)
GFR SERPL CREATININE-BSD FRML MDRD: 85 ML/MIN/1.73SQ M
GLUCOSE P FAST SERPL-MCNC: 87 MG/DL (ref 65–99)
HCT VFR BLD AUTO: 42 % (ref 34.8–46.1)
HCV AB SER QL: NORMAL
HDLC SERPL-MCNC: 73 MG/DL
HGB BLD-MCNC: 13.5 G/DL (ref 11.5–15.4)
IMM GRANULOCYTES # BLD AUTO: 0 THOUSAND/UL (ref 0–0.2)
IMM GRANULOCYTES NFR BLD AUTO: 0 % (ref 0–2)
LDLC SERPL CALC-MCNC: 108 MG/DL (ref 0–100)
LYMPHOCYTES # BLD AUTO: 1.13 THOUSANDS/ΜL (ref 0.6–4.47)
LYMPHOCYTES NFR BLD AUTO: 26 % (ref 14–44)
MCH RBC QN AUTO: 31.5 PG (ref 26.8–34.3)
MCHC RBC AUTO-ENTMCNC: 32.1 G/DL (ref 31.4–37.4)
MCV RBC AUTO: 98 FL (ref 82–98)
MONOCYTES # BLD AUTO: 0.5 THOUSAND/ΜL (ref 0.17–1.22)
MONOCYTES NFR BLD AUTO: 12 % (ref 4–12)
NEUTROPHILS # BLD AUTO: 2.6 THOUSANDS/ΜL (ref 1.85–7.62)
NEUTS SEG NFR BLD AUTO: 59 % (ref 43–75)
NRBC BLD AUTO-RTO: 0 /100 WBCS
PLATELET # BLD AUTO: 180 THOUSANDS/UL (ref 149–390)
PMV BLD AUTO: 11.5 FL (ref 8.9–12.7)
POTASSIUM SERPL-SCNC: 4.1 MMOL/L (ref 3.5–5.3)
PROT SERPL-MCNC: 7.6 G/DL (ref 6.4–8.2)
RBC # BLD AUTO: 4.29 MILLION/UL (ref 3.81–5.12)
SODIUM SERPL-SCNC: 140 MMOL/L (ref 136–145)
TRIGL SERPL-MCNC: 31 MG/DL
TSH SERPL DL<=0.05 MIU/L-ACNC: 2.81 UIU/ML (ref 0.36–3.74)
WBC # BLD AUTO: 4.33 THOUSAND/UL (ref 4.31–10.16)

## 2021-08-06 PROCEDURE — 80061 LIPID PANEL: CPT

## 2021-08-06 PROCEDURE — 86803 HEPATITIS C AB TEST: CPT

## 2021-08-06 PROCEDURE — 36415 COLL VENOUS BLD VENIPUNCTURE: CPT

## 2021-08-06 PROCEDURE — 85025 COMPLETE CBC W/AUTO DIFF WBC: CPT

## 2021-08-06 PROCEDURE — 80053 COMPREHEN METABOLIC PANEL: CPT

## 2021-08-06 PROCEDURE — 84443 ASSAY THYROID STIM HORMONE: CPT

## 2021-08-11 ENCOUNTER — HOSPITAL ENCOUNTER (EMERGENCY)
Facility: HOSPITAL | Age: 51
Discharge: HOME | End: 2021-08-11
Attending: EMERGENCY MEDICINE
Payer: COMMERCIAL

## 2021-08-11 VITALS
DIASTOLIC BLOOD PRESSURE: 67 MMHG | RESPIRATION RATE: 18 BRPM | HEART RATE: 67 BPM | SYSTOLIC BLOOD PRESSURE: 141 MMHG | OXYGEN SATURATION: 99 % | TEMPERATURE: 99.3 F

## 2021-08-11 DIAGNOSIS — S76.301A HAMSTRING INJURY, RIGHT, INITIAL ENCOUNTER: Primary | ICD-10-CM

## 2021-08-11 PROCEDURE — 99283 EMERGENCY DEPT VISIT LOW MDM: CPT

## 2021-08-11 PROCEDURE — 99284 EMERGENCY DEPT VISIT MOD MDM: CPT | Performed by: EMERGENCY MEDICINE

## 2021-08-11 RX ORDER — NAPROXEN 250 MG/1
500 TABLET ORAL ONCE
Status: COMPLETED | OUTPATIENT
Start: 2021-08-11 | End: 2021-08-11

## 2021-08-11 RX ORDER — NAPROXEN 500 MG/1
500 TABLET ORAL 2 TIMES DAILY WITH MEALS
Qty: 20 TABLET | Refills: 0 | Status: SHIPPED | OUTPATIENT
Start: 2021-08-11 | End: 2021-09-03

## 2021-08-11 RX ORDER — ACETAMINOPHEN 325 MG/1
975 TABLET ORAL ONCE
Status: COMPLETED | OUTPATIENT
Start: 2021-08-11 | End: 2021-08-11

## 2021-08-11 RX ADMIN — ACETAMINOPHEN 975 MG: 325 TABLET, FILM COATED ORAL at 18:23

## 2021-08-11 RX ADMIN — NAPROXEN 500 MG: 250 TABLET ORAL at 18:23

## 2021-08-11 NOTE — ED PROVIDER NOTES
History  Chief Complaint   Patient presents with    Leg Injury     pt reports she was water skiing and felt a pull in the back of her right leg, from her buttcheek to behind the knee      This is a 49 y o  old female who presents to the ED for evaluation of thigh pain  Was waterskiing, felt something pull and now can't walk or sit comfortbale  Happened at [facility] around noon  No medications for it  No numbness or tingling  Prior to Admission Medications   Prescriptions Last Dose Informant Patient Reported? Taking?   levothyroxine 50 mcg tablet   No No   Sig: TAKE 1 TABLET DAILY      Facility-Administered Medications: None     Past Medical History:   Diagnosis Date    Disease of thyroid gland      Past Surgical History:   Procedure Laterality Date    BIOPSY CORE NEEDLE Right 01/10/2017    BIOPSY BREAST PERCUTANTOUS NEEDLE CORE    BREAST BIOPSY Right 01/10/2017     biopsy benign     SECTION      DESC: 3/2000,10/2001,10/2005,2007     SECTION      [place] COLONOSCOPY FLX DX W/COLLJ SPEC WHEN PFRMD N/A 2018    Procedure: COLONOSCOPY;  Surgeon: Mansi Michael MD;  [facility]; Service: Gastroenterology    TUBAL LIGATION      US GUIDED BREAST BIOPSY RIGHT COMPLETE Right 1/10/2017    WISDOM TOOTH EXTRACTION       Family History   Problem Relation Age of Onset    Coronary artery disease Mother     Heart attack Mother     No Known Problems Father     Colon cancer Maternal Aunt         ? 29's    No Known Problems Daughter     No Known Problems Maternal Grandmother     No Known Problems Maternal Grandfather     No Known Problems Paternal Grandmother     No Known Problems Paternal Grandfather     No Known Problems Maternal Aunt     No Known Problems Paternal Aunt     Prostate cancer Maternal Uncle         70's     I have reviewed and agree with the history as documented      E-Cigarette/Vaping     E-Cigarette/Vaping Substances     Social History     Tobacco Use    Smoking status: Never Smoker    Smokeless tobacco: Never Used   Substance Use Topics    Alcohol use: Yes     Comment: 2-3 per month    Drug use: No     Review of Systems   Constitutional: Negative for chills, fatigue, fever and unexpected weight change  HENT: Negative for congestion, rhinorrhea and sore throat  Eyes: Negative for redness and visual disturbance  Respiratory: Negative for cough and shortness of breath  Cardiovascular: Negative for chest pain and leg swelling  Gastrointestinal: Negative for abdominal pain, constipation, diarrhea, nausea and vomiting  Endocrine: Negative for cold intolerance and heat intolerance  Genitourinary: Negative for dysuria, frequency and urgency  Musculoskeletal: Negative for back pain  Skin: Negative for rash  Neurological: Negative for dizziness, syncope and numbness  All other systems reviewed and are negative  Physical Exam  Physical Exam  Vitals and nursing note reviewed  Constitutional:       General: She is not in acute distress  Appearance: She is well-developed  She is not diaphoretic  HENT:      Head: Normocephalic and atraumatic  Right Ear: External ear normal       Left Ear: External ear normal       Nose: Nose normal       Mouth/Throat:      Mouth: Mucous membranes are moist       Pharynx: No oropharyngeal exudate  Eyes:      Conjunctiva/sclera: Conjunctivae normal       Pupils: Pupils are equal, round, and reactive to light  Cardiovascular:      Rate and Rhythm: Normal rate and regular rhythm  Heart sounds: Normal heart sounds  No murmur heard  No gallop  Pulmonary:      Effort: Pulmonary effort is normal       Breath sounds: Normal breath sounds  No wheezing  Chest:      Chest wall: No tenderness  Abdominal:      General: Bowel sounds are normal  There is no distension  Palpations: Abdomen is soft  Tenderness: There is no abdominal tenderness  There is no guarding or rebound  Musculoskeletal:         General: Tenderness (R medial hamstring) present  No deformity  Normal range of motion  Cervical back: Normal range of motion and neck supple  Lymphadenopathy:      Cervical: No cervical adenopathy  Skin:     General: Skin is warm and dry  Findings: No erythema or rash  Neurological:      Mental Status: She is alert and oriented to person, place, and time  Cranial Nerves: No cranial nerve deficit  Vital Signs  ED Triage Vitals [08/11/21 1735]   Temperature Pulse Respirations Blood Pressure SpO2   99 3 °F (37 4 °C) 67 18 141/67 99 %      Temp src Heart Rate Source Patient Position - Orthostatic VS BP Location FiO2 (%)   -- -- Sitting Right arm --      Pain Score       8         ED Medications  Medications   naproxen (NAPROSYN) tablet 500 mg (has no administration in time range)   acetaminophen (TYLENOL) tablet 975 mg (has no administration in time range)       Diagnostic Studies  Results Reviewed     None        No orders to display     Procedures  Procedures    ED Course        A/P: This is a 49 y o  female who presents to the ED for evaluation of Hamstring injury  Suspect strain  Jesse WILLIAM  Ortho follow up  I personally discussed return precautions with this patient and family  I provided the patient with written discharge instructions and particularly highlighted specific areas of interest to this patient, including but not limited to: medications for symptom managment, follow up recommendations, and return precautions  Patient and family are in agreement with this plan as outlined above      MDM    Disposition  Final diagnoses:   Hamstring injury, right, initial encounter     Time reflects when diagnosis was documented in both MDM as applicable and the Disposition within this note     Time User Action Codes Description Comment    8/11/2021  5:57 PM Lion Christianson Add [S08 [id number]] Hamstring injury, right, initial encounter       ED Disposition     ED Disposition Condition Date/Time Comment    Discharge Stable Wed Aug 11, 2021  5:57 PM Abigail Garcia discharge to home/self care              Follow-up Information     Follow up With Specialties Details Why Contact Info Additional [address] , DO Internal Medicine Call   [address] [place] [address] [phone] [facility] Call  follow up Juanito Sandoval [address] [facility]"" 85 [id number] [address] [facility], [address] [place], [place], [address] [phone]          Patient's Medications   Discharge Prescriptions    NAPROXEN (NAPROSYN) 500 MG TABLET    Take 1 tablet (500 mg total) by mouth 2 (two) times a day with meals       Start Date: 8/11/2021 End Date: --       Order Dose: 500 mg       Quantity: 20 tablet    Refills: 0     Outpatient Discharge Orders   Crutches       PDMP Review     None          ED Provider  Electronically Signed by           Eloise Boyd MD  08/11/21 0471

## 2021-09-03 ENCOUNTER — OFFICE VISIT (OUTPATIENT)
Dept: OBGYN CLINIC | Facility: CLINIC | Age: 51
End: 2021-09-03
Payer: COMMERCIAL

## 2021-09-03 ENCOUNTER — APPOINTMENT (OUTPATIENT)
Dept: LAB | Facility: CLINIC | Age: 51
End: 2021-09-03
Payer: COMMERCIAL

## 2021-09-03 VITALS
WEIGHT: 147 LBS | HEIGHT: 62 IN | HEART RATE: 66 BPM | BODY MASS INDEX: 27.05 KG/M2 | SYSTOLIC BLOOD PRESSURE: 128 MMHG | DIASTOLIC BLOOD PRESSURE: 75 MMHG

## 2021-09-03 DIAGNOSIS — S76.311A STRAIN OF RIGHT HAMSTRING MUSCLE, INITIAL ENCOUNTER: Primary | ICD-10-CM

## 2021-09-03 DIAGNOSIS — N63.20 LEFT BREAST MASS: ICD-10-CM

## 2021-09-03 LAB
ALBUMIN SERPL BCP-MCNC: 4.2 G/DL (ref 3.5–5)
ALP SERPL-CCNC: 70 U/L (ref 46–116)
ALT SERPL W P-5'-P-CCNC: 21 U/L (ref 12–78)
ANION GAP SERPL CALCULATED.3IONS-SCNC: 7 MMOL/L (ref 4–13)
AST SERPL W P-5'-P-CCNC: 23 U/L (ref 5–45)
BASOPHILS # BLD AUTO: 0.03 THOUSANDS/ΜL (ref 0–0.1)
BASOPHILS NFR BLD AUTO: 1 % (ref 0–1)
BILIRUB SERPL-MCNC: 0.38 MG/DL (ref 0.2–1)
BUN SERPL-MCNC: 15 MG/DL (ref 5–25)
CALCIUM SERPL-MCNC: 9.6 MG/DL (ref 8.3–10.1)
CHLORIDE SERPL-SCNC: 107 MMOL/L (ref 100–108)
CO2 SERPL-SCNC: 30 MMOL/L (ref 21–32)
CREAT SERPL-MCNC: 0.84 MG/DL (ref 0.6–1.3)
EOSINOPHIL # BLD AUTO: 0.1 THOUSAND/ΜL (ref 0–0.61)
EOSINOPHIL NFR BLD AUTO: 3 % (ref 0–6)
ERYTHROCYTE [DISTWIDTH] IN BLOOD BY AUTOMATED COUNT: 11.9 % (ref 11.6–15.1)
GFR SERPL CREATININE-BSD FRML MDRD: 81 ML/MIN/1.73SQ M
GLUCOSE SERPL-MCNC: 95 MG/DL (ref 65–140)
HCT VFR BLD AUTO: 42.5 % (ref 34.8–46.1)
HGB BLD-MCNC: 13.7 G/DL (ref 11.5–15.4)
IMM GRANULOCYTES # BLD AUTO: 0 THOUSAND/UL (ref 0–0.2)
IMM GRANULOCYTES NFR BLD AUTO: 0 % (ref 0–2)
LYMPHOCYTES # BLD AUTO: 0.88 THOUSANDS/ΜL (ref 0.6–4.47)
LYMPHOCYTES NFR BLD AUTO: 25 % (ref 14–44)
MCH RBC QN AUTO: 31.4 PG (ref 26.8–34.3)
MCHC RBC AUTO-ENTMCNC: 32.2 G/DL (ref 31.4–37.4)
MCV RBC AUTO: 97 FL (ref 82–98)
MONOCYTES # BLD AUTO: 0.51 THOUSAND/ΜL (ref 0.17–1.22)
MONOCYTES NFR BLD AUTO: 15 % (ref 4–12)
NEUTROPHILS # BLD AUTO: 1.96 THOUSANDS/ΜL (ref 1.85–7.62)
NEUTS SEG NFR BLD AUTO: 56 % (ref 43–75)
NRBC BLD AUTO-RTO: 0 /100 WBCS
PLATELET # BLD AUTO: 177 THOUSANDS/UL (ref 149–390)
PMV BLD AUTO: 11.7 FL (ref 8.9–12.7)
POTASSIUM SERPL-SCNC: 4.1 MMOL/L (ref 3.5–5.3)
PROT SERPL-MCNC: 7.6 G/DL (ref 6.4–8.2)
RBC # BLD AUTO: 4.37 MILLION/UL (ref 3.81–5.12)
SODIUM SERPL-SCNC: 144 MMOL/L (ref 136–145)
WBC # BLD AUTO: 3.48 THOUSAND/UL (ref 4.31–10.16)

## 2021-09-03 PROCEDURE — 36415 COLL VENOUS BLD VENIPUNCTURE: CPT

## 2021-09-03 PROCEDURE — 1036F TOBACCO NON-USER: CPT | Performed by: ORTHOPAEDIC SURGERY

## 2021-09-03 PROCEDURE — 3008F BODY MASS INDEX DOCD: CPT | Performed by: ORTHOPAEDIC SURGERY

## 2021-09-03 PROCEDURE — 99203 OFFICE O/P NEW LOW 30 MIN: CPT | Performed by: ORTHOPAEDIC SURGERY

## 2021-09-03 PROCEDURE — 80053 COMPREHEN METABOLIC PANEL: CPT

## 2021-09-03 PROCEDURE — 85025 COMPLETE CBC W/AUTO DIFF WBC: CPT

## 2021-09-03 NOTE — PROGRESS NOTES
Patient Name:  Nicole Garibay  MRN:  [id number]    Assessment & Plan     Right hamstring strain 2021, significantly improved  Patient may resume normal activity as tolerated  She will contact me if she has persistent pain  Chief Complaint     Right hamstring injury    History of the Present Illness     Francois Garcia is a 49 y o  female with right posterior thigh pain that started suddenly after an injury while waterskiing on 2021  She was initially unable to sit or walk comfortably and presented to the emergency department where she was diagnosed with a hamstring injury  The symptoms have largely resolved since that time  Physical Exam     /75   Pulse 66   Ht 5' 2" (1 575 m)   Wt 66 7 kg (147 lb)   BMI 26 89 kg/m²     Right hip:  Deformity: None  Tenderness: None  Range of motion: Flexion normal, ER normal, IR normal  Strength: Normal  Log roll test: Negative  Impingement test: Negative  EDMOND test: Negative  SLR against resistance: Negative    Right knee:  Effusion: None  Tenderness: None  Range of motion: Normal  Lachman test: Stable  Varus stress: Stable  Valgus stress: Stable  Posterior drawer test: Stable  Sherrie's test: Negative    Eyes: Anicteric sclerae  ENT: Trachea midline  Lungs: Normal respiratory effort  CV: Capillary refill is less than 2 seconds  Skin: Intact without erythema  Lymph: No palpable lymphadenopathy  Neuro: Sensation is grossly intact to light touch  Psych: Mood and affect are appropriate        Past Medical History:   Diagnosis Date    Disease of thyroid gland        Past Surgical History:   Procedure Laterality Date    BIOPSY CORE NEEDLE Right 01/10/2017    BIOPSY BREAST PERCUTANTOUS NEEDLE CORE    BREAST BIOPSY Right 01/10/2017     biopsy benign     SECTION      DESC: 3/2000,10/2001,10/2005,2007     SECTION      [place] COLONOSCOPY FLX DX W/COLLJ SPEC WHEN PFRMD N/A 2018    Procedure: COLONOSCOPY;  Surgeon: Dilma Patel MD; [facility]; Service: Gastroenterology    TUBAL LIGATION      US GUIDED BREAST BIOPSY RIGHT COMPLETE Right 1/10/2017    WISDOM TOOTH EXTRACTION         No Known Allergies    Current Outpatient Medications on File Prior to Visit   Medication Sig Dispense Refill    levothyroxine 50 mcg tablet TAKE 1 TABLET DAILY 90 tablet 3    [DISCONTINUED] naproxen (NAPROSYN) 500 mg tablet Take 1 tablet (500 mg total) by mouth 2 (two) times a day with meals (Patient not taking: Reported on 9/3/2021) 20 tablet 0     No current facility-administered medications on file prior to visit  Social History     Tobacco Use    Smoking status: Never Smoker    Smokeless tobacco: Never Used   Substance Use Topics    Alcohol use: Yes     Comment: 2-3 per month    Drug use: No       Family History   Problem Relation Age of Onset    Coronary artery disease Mother     Heart attack Mother     No Known Problems Father     Colon cancer Maternal Aunt         ? 30's    No Known Problems Daughter     No Known Problems Maternal Grandmother     No Known Problems Maternal Grandfather     No Known Problems Paternal Grandmother     No Known Problems Paternal Grandfather     No Known Problems Maternal Aunt     No Known Problems Paternal Aunt     Prostate cancer Maternal Uncle         66's       Scribe Attestation    I,:  Eb Lynn am acting as a scribe while in the presence of the attending physician :       I,:  Dez Webber MD personally performed the services described in this documentation    as scribed in my presence :

## 2021-09-07 ENCOUNTER — OFFICE VISIT (OUTPATIENT)
Dept: SURGICAL ONCOLOGY | Facility: CLINIC | Age: 51
End: 2021-09-07

## 2021-09-07 VITALS
SYSTOLIC BLOOD PRESSURE: 106 MMHG | HEIGHT: 62 IN | HEART RATE: 93 BPM | TEMPERATURE: 97.9 F | BODY MASS INDEX: 27.6 KG/M2 | DIASTOLIC BLOOD PRESSURE: 78 MMHG | OXYGEN SATURATION: 99 % | WEIGHT: 150 LBS | RESPIRATION RATE: 18 BRPM

## 2021-09-07 DIAGNOSIS — N63.20 LEFT BREAST MASS: Primary | ICD-10-CM

## 2021-09-07 PROCEDURE — PREOP: Performed by: NURSE PRACTITIONER

## 2021-09-07 PROCEDURE — 3008F BODY MASS INDEX DOCD: CPT | Performed by: ORTHOPAEDIC SURGERY

## 2021-09-07 NOTE — H&P (VIEW-ONLY)
Surgical Oncology Follow Up       [address]  [facility]  [address]  [place] [address]    Rubén Aaron Jose  1970  [id number]      Chief Complaint   Patient presents with    Follow-up       Assessment/Plan:  1  Left breast mass  - Surgery as scheduled with Dr Benjamin Lopes     Discussion/Summary:  Patient is a 78-year-old female with a history of fibrocystic breast tissue and a benign right breast fibroadenoma that presented to our office in June of 2021 with an increasing mass of her for upper outer left breast/chest wall  She desired surgical excision  She met with Dr Benjamin Lopes and consented for an excision of the mass  This is scheduled for September 23, 2021  Patient presents today for a preoperative history and physical   She offers no new complaints today and there are no worrisome findings on her exam today  She will proceed with surgery as scheduled  She knows to contact our office with any changes or concerns prior to her surgery date  All of her questions were answered today  History of Present Illness:     -Interval History:  Patient presents today for a preoperative history and physical   She is scheduled for an excision of a left breast/chest mass with Dr Benjamin Lopes in 9/23  She completed her preoperative labs  Her WBC was slightly low at 3 48  Patient states this has been low intermittently in the past   She notices no changes to the left chest mass  Denies headaches, back pain, bone pain, chest pain, palpitations, cough, SOB, abdominal pain  Reports no issues with anesthesia in the past      Review of Systems:  Review of Systems   Constitutional: Negative for activity change, appetite change, chills, fatigue, fever and unexpected weight change  Respiratory: Negative for cough and shortness of breath  Cardiovascular: Negative for chest pain     Gastrointestinal: Negative for abdominal pain, constipation, diarrhea, nausea and vomiting  Musculoskeletal: Negative for arthralgias, back pain, gait problem and myalgias  Skin: Negative for color change and rash  Neurological: Negative for dizziness and headaches  Hematological: Negative for adenopathy  Psychiatric/Behavioral: Negative for agitation and confusion  All other systems reviewed and are negative  Patient Active Problem List   Diagnosis    Wellness examination    Family history of colon cancer    Vision disturbance    Screening for cardiovascular condition    Edema    Hypothyroidism    Elevated brain natriuretic peptide (BNP) level    Perimenopausal symptom    COVID-19    Left breast mass     Past Medical History:   Diagnosis Date    Disease of thyroid gland      Past Surgical History:   Procedure Laterality Date    BIOPSY CORE NEEDLE Right 01/10/2017    BIOPSY BREAST PERCUTANTOUS NEEDLE CORE    BREAST BIOPSY Right 01/10/2017     biopsy benign     SECTION      DESC: 3/2000,10/2001,10/2005,2007     SECTION      [place] COLONOSCOPY FLX DX W/COLLJ SPEC WHEN PFRMD N/A 2018    Procedure: COLONOSCOPY;  Surgeon: Jennifer Breen MD;  [facility]; Service: Gastroenterology    TUBAL LIGATION      US GUIDED BREAST BIOPSY RIGHT COMPLETE Right 1/10/2017    WISDOM TOOTH EXTRACTION       Family History   Problem Relation Age of Onset    Coronary artery disease Mother     Heart attack Mother     No Known Problems Father     Colon cancer Maternal Aunt         ? 30's    No Known Problems Daughter     No Known Problems Maternal Grandmother     No Known Problems Maternal Grandfather     No Known Problems Paternal Grandmother     No Known Problems Paternal Grandfather     No Known Problems Maternal Aunt     No Known Problems Paternal Aunt     Prostate cancer Maternal Uncle         66's     Social History     Socioeconomic History    Marital status: /Civil Union     Spouse name: Not on file    Number of children: Not on file    Years of education: 2ND YEAR COLLEGE    Highest education level: Not on file   Occupational History    Occupation: HOMEMAKER   Tobacco Use    Smoking status: Never Smoker    Smokeless tobacco: Never Used   Substance and Sexual Activity    Alcohol use: Yes     Comment: 2-3 per month    Drug use: No    Sexual activity: Yes     Comment: USES BIRTH CONTROL   Other Topics Concern    Not on file   Social History Narrative    DAILY COFFEE CONSUMPTION 2 CUPS A DAY    DENIED---- HOME ENVIRONMENT DOMESTIC VIOLENCE    LIVING INDEPENDENTLY WITH SPOUSE     Social Determinants of Health     Financial Resource Strain:     Difficulty of Paying Living Expenses:    Food Insecurity:     Worried About Running Out of Food in the Last Year:     Ran Out of Food in the Last Year:    Transportation Needs:     Lack of Transportation (Medical):  Lack of Transportation (Non-Medical):    Physical Activity:     Days of Exercise per Week:     Minutes of Exercise per Session:    Stress:     Feeling of Stress :    Social Connections:     Frequency of Communication with Friends and Family:     Frequency of Social Gatherings with Friends and Family:     Attends Nondenominational Services:     Active Member of Clubs or Organizations:     Attends Club or Organization Meetings:     Marital Status:    Intimate Partner Violence:     Fear of Current or Ex-Partner:     Emotionally Abused:     Physically Abused:     Sexually Abused:        Current Outpatient Medications:     levothyroxine 50 mcg tablet, TAKE 1 TABLET DAILY, Disp: 90 tablet, Rfl: 3  No Known Allergies  Vitals:    09/07/21 1027   BP: 106/78   Pulse: 93   Resp: 18   Temp: 97 9 °F (36 6 °C)   SpO2: 99%       Physical Exam  Vitals reviewed  Constitutional:       General: She is not in acute distress  Appearance: Normal appearance  She is well-developed  She is not diaphoretic  HENT:      Head: Normocephalic and atraumatic     Neck:      Vascular: No carotid bruit  Cardiovascular:      Rate and Rhythm: Normal rate and regular rhythm  Heart sounds: Normal heart sounds  Pulmonary:      Effort: Pulmonary effort is normal       Breath sounds: Normal breath sounds  Chest:       Abdominal:      Palpations: Abdomen is soft  There is no mass  Tenderness: There is no abdominal tenderness  Musculoskeletal:         General: Normal range of motion  Cervical back: Normal range of motion  Right lower leg: No edema  Left lower leg: No edema  Lymphadenopathy:      Cervical: No cervical adenopathy  Upper Body:      Right upper body: No supraclavicular or axillary adenopathy  Left upper body: No supraclavicular or axillary adenopathy  Skin:     General: Skin is warm and dry  Findings: No rash  Neurological:      Mental Status: She is alert and oriented to person, place, and time  Psychiatric:         Speech: Speech normal            Advance Care Planning/Advance Directives:  Discussed disease status and treatment goals with the patient

## 2021-09-07 NOTE — PROGRESS NOTES
Surgical Oncology Follow Up       [address] [facility]  [facility]  [address]  [place] [address]    Denisha Garcia  1970  [id number]      Chief Complaint   Patient presents with    Follow-up       Assessment/Plan:  1  Left breast mass  - Surgery as scheduled with Dr Nikko Smith     Discussion/Summary:  Patient is a 21-year-old female with a history of fibrocystic breast tissue and a benign right breast fibroadenoma that presented to our office in June of 2021 with an increasing mass of her for upper outer left breast/chest wall  She desired surgical excision  She met with Dr Nikko Smith and consented for an excision of the mass  This is scheduled for September 23, 2021  Patient presents today for a preoperative history and physical   She offers no new complaints today and there are no worrisome findings on her exam today  She will proceed with surgery as scheduled  She knows to contact our office with any changes or concerns prior to her surgery date  All of her questions were answered today  History of Present Illness:     -Interval History:  Patient presents today for a preoperative history and physical   She is scheduled for an excision of a left breast/chest mass with Dr Nikko Smith in 9/23  She completed her preoperative labs  Her WBC was slightly low at 3 48  Patient states this has been low intermittently in the past   She notices no changes to the left chest mass  Denies headaches, back pain, bone pain, chest pain, palpitations, cough, SOB, abdominal pain  Reports no issues with anesthesia in the past      Review of Systems:  Review of Systems   Constitutional: Negative for activity change, appetite change, chills, fatigue, fever and unexpected weight change  Respiratory: Negative for cough and shortness of breath  Cardiovascular: Negative for chest pain     Gastrointestinal: Negative for abdominal pain, constipation, diarrhea, nausea and vomiting  Musculoskeletal: Negative for arthralgias, back pain, gait problem and myalgias  Skin: Negative for color change and rash  Neurological: Negative for dizziness and headaches  Hematological: Negative for adenopathy  Psychiatric/Behavioral: Negative for agitation and confusion  All other systems reviewed and are negative  Patient Active Problem List   Diagnosis    Wellness examination    Family history of colon cancer    Vision disturbance    Screening for cardiovascular condition    Edema    Hypothyroidism    Elevated brain natriuretic peptide (BNP) level    Perimenopausal symptom    COVID-19    Left breast mass     Past Medical History:   Diagnosis Date    Disease of thyroid gland      Past Surgical History:   Procedure Laterality Date    BIOPSY CORE NEEDLE Right 01/10/2017    BIOPSY BREAST PERCUTANTOUS NEEDLE CORE    BREAST BIOPSY Right 01/10/2017     biopsy benign     SECTION      DESC: 3/2000,10/2001,10/2005,2007     SECTION      [place] COLONOSCOPY FLX DX W/COLLJ SPEC WHEN PFRMD N/A 2018    Procedure: COLONOSCOPY;  Surgeon: Keli Hodge MD;  [facility]; Service: Gastroenterology    TUBAL LIGATION      US GUIDED BREAST BIOPSY RIGHT COMPLETE Right 1/10/2017    WISDOM TOOTH EXTRACTION       Family History   Problem Relation Age of Onset    Coronary artery disease Mother     Heart attack Mother     No Known Problems Father     Colon cancer Maternal Aunt         ? 30's    No Known Problems Daughter     No Known Problems Maternal Grandmother     No Known Problems Maternal Grandfather     No Known Problems Paternal Grandmother     No Known Problems Paternal Grandfather     No Known Problems Maternal Aunt     No Known Problems Paternal Aunt     Prostate cancer Maternal Uncle         66's     Social History     Socioeconomic History    Marital status: /Civil Union     Spouse name: Not on file    Number of children: Not on file    Years of education: 2ND YEAR COLLEGE    Highest education level: Not on file   Occupational History    Occupation: HOMEMAKER   Tobacco Use    Smoking status: Never Smoker    Smokeless tobacco: Never Used   Substance and Sexual Activity    Alcohol use: Yes     Comment: 2-3 per month    Drug use: No    Sexual activity: Yes     Comment: USES BIRTH CONTROL   Other Topics Concern    Not on file   Social History Narrative    DAILY COFFEE CONSUMPTION 2 CUPS A DAY    DENIED---- HOME ENVIRONMENT DOMESTIC VIOLENCE    LIVING INDEPENDENTLY WITH SPOUSE     Social Determinants of Health     Financial Resource Strain:     Difficulty of Paying Living Expenses:    Food Insecurity:     Worried About Running Out of Food in the Last Year:     Ran Out of Food in the Last Year:    Transportation Needs:     Lack of Transportation (Medical):  Lack of Transportation (Non-Medical):    Physical Activity:     Days of Exercise per Week:     Minutes of Exercise per Session:    Stress:     Feeling of Stress :    Social Connections:     Frequency of Communication with Friends and Family:     Frequency of Social Gatherings with Friends and Family:     Attends Congregation Services:     Active Member of Clubs or Organizations:     Attends Club or Organization Meetings:     Marital Status:    Intimate Partner Violence:     Fear of Current or Ex-Partner:     Emotionally Abused:     Physically Abused:     Sexually Abused:        Current Outpatient Medications:     levothyroxine 50 mcg tablet, TAKE 1 TABLET DAILY, Disp: 90 tablet, Rfl: 3  No Known Allergies  Vitals:    09/07/21 1027   BP: 106/78   Pulse: 93   Resp: 18   Temp: 97 9 °F (36 6 °C)   SpO2: 99%       Physical Exam  Vitals reviewed  Constitutional:       General: She is not in acute distress  Appearance: Normal appearance  She is well-developed  She is not diaphoretic  HENT:      Head: Normocephalic and atraumatic     Neck:      Vascular: No carotid bruit  Cardiovascular:      Rate and Rhythm: Normal rate and regular rhythm  Heart sounds: Normal heart sounds  Pulmonary:      Effort: Pulmonary effort is normal       Breath sounds: Normal breath sounds  Chest:       Abdominal:      Palpations: Abdomen is soft  There is no mass  Tenderness: There is no abdominal tenderness  Musculoskeletal:         General: Normal range of motion  Cervical back: Normal range of motion  Right lower leg: No edema  Left lower leg: No edema  Lymphadenopathy:      Cervical: No cervical adenopathy  Upper Body:      Right upper body: No supraclavicular or axillary adenopathy  Left upper body: No supraclavicular or axillary adenopathy  Skin:     General: Skin is warm and dry  Findings: No rash  Neurological:      Mental Status: She is alert and oriented to person, place, and time  Psychiatric:         Speech: Speech normal            Advance Care Planning/Advance Directives:  Discussed disease status and treatment goals with the patient

## 2021-09-22 ENCOUNTER — ANESTHESIA EVENT (OUTPATIENT)
Dept: PERIOP | Facility: HOSPITAL | Age: 51
End: 2021-09-22
Payer: COMMERCIAL

## 2021-09-23 ENCOUNTER — HOSPITAL ENCOUNTER (OUTPATIENT)
Facility: HOSPITAL | Age: 51
Setting detail: OUTPATIENT SURGERY
Discharge: HOME | End: 2021-09-23
Attending: SURGERY | Admitting: SURGERY
Payer: COMMERCIAL

## 2021-09-23 ENCOUNTER — ANESTHESIA (OUTPATIENT)
Dept: PERIOP | Facility: HOSPITAL | Age: 51
End: 2021-09-23
Payer: COMMERCIAL

## 2021-09-23 VITALS
HEART RATE: 56 BPM | DIASTOLIC BLOOD PRESSURE: 59 MMHG | TEMPERATURE: 97.1 F | RESPIRATION RATE: 20 BRPM | SYSTOLIC BLOOD PRESSURE: 110 MMHG | OXYGEN SATURATION: 99 %

## 2021-09-23 DIAGNOSIS — N63.20 LEFT BREAST MASS: ICD-10-CM

## 2021-09-23 LAB
EXT PREGNANCY TEST URINE: NEGATIVE
EXT. CONTROL: NORMAL

## 2021-09-23 PROCEDURE — 81025 URINE PREGNANCY TEST: CPT | Performed by: SURGERY

## 2021-09-23 PROCEDURE — 88304 TISSUE EXAM BY PATHOLOGIST: CPT | Performed by: PATHOLOGY

## 2021-09-23 PROCEDURE — 19301 PARTIAL MASTECTOMY: CPT | Performed by: SURGERY

## 2021-09-23 RX ORDER — SODIUM CHLORIDE, SODIUM LACTATE, POTASSIUM CHLORIDE, CALCIUM CHLORIDE 600; 310; 30; 20 MG/100ML; MG/100ML; MG/100ML; MG/100ML
125 INJECTION, SOLUTION INTRAVENOUS CONTINUOUS
Status: DISCONTINUED | OUTPATIENT
Start: 2021-09-23 | End: 2021-09-23 | Stop reason: HOSPADM

## 2021-09-23 RX ORDER — LIDOCAINE HYDROCHLORIDE 10 MG/ML
INJECTION, SOLUTION EPIDURAL; INFILTRATION; INTRACAUDAL; PERINEURAL AS NEEDED
Status: DISCONTINUED | OUTPATIENT
Start: 2021-09-23 | End: 2021-09-23

## 2021-09-23 RX ORDER — ONDANSETRON 2 MG/ML
INJECTION INTRAMUSCULAR; INTRAVENOUS AS NEEDED
Status: DISCONTINUED | OUTPATIENT
Start: 2021-09-23 | End: 2021-09-23

## 2021-09-23 RX ORDER — PROPOFOL 10 MG/ML
INJECTION, EMULSION INTRAVENOUS AS NEEDED
Status: DISCONTINUED | OUTPATIENT
Start: 2021-09-23 | End: 2021-09-23

## 2021-09-23 RX ORDER — MAGNESIUM HYDROXIDE 1200 MG/15ML
LIQUID ORAL AS NEEDED
Status: DISCONTINUED | OUTPATIENT
Start: 2021-09-23 | End: 2021-09-23 | Stop reason: HOSPADM

## 2021-09-23 RX ORDER — ACETAMINOPHEN 325 MG/1
650 TABLET ORAL EVERY 6 HOURS PRN
Status: DISCONTINUED | OUTPATIENT
Start: 2021-09-23 | End: 2021-09-23 | Stop reason: HOSPADM

## 2021-09-23 RX ORDER — MIDAZOLAM HYDROCHLORIDE 2 MG/2ML
INJECTION, SOLUTION INTRAMUSCULAR; INTRAVENOUS AS NEEDED
Status: DISCONTINUED | OUTPATIENT
Start: 2021-09-23 | End: 2021-09-23

## 2021-09-23 RX ORDER — FENTANYL CITRATE/PF 50 MCG/ML
50 SYRINGE (ML) INJECTION
Status: DISCONTINUED | OUTPATIENT
Start: 2021-09-23 | End: 2021-09-23 | Stop reason: HOSPADM

## 2021-09-23 RX ORDER — CEFAZOLIN SODIUM 1 G/50ML
1000 SOLUTION INTRAVENOUS
Status: DISCONTINUED | OUTPATIENT
Start: 2021-09-23 | End: 2021-09-23 | Stop reason: HOSPADM

## 2021-09-23 RX ORDER — SODIUM CHLORIDE, SODIUM LACTATE, POTASSIUM CHLORIDE, CALCIUM CHLORIDE 600; 310; 30; 20 MG/100ML; MG/100ML; MG/100ML; MG/100ML
INJECTION, SOLUTION INTRAVENOUS CONTINUOUS PRN
Status: DISCONTINUED | OUTPATIENT
Start: 2021-09-23 | End: 2021-09-23

## 2021-09-23 RX ORDER — DEXAMETHASONE SODIUM PHOSPHATE 10 MG/ML
INJECTION, SOLUTION INTRAMUSCULAR; INTRAVENOUS AS NEEDED
Status: DISCONTINUED | OUTPATIENT
Start: 2021-09-23 | End: 2021-09-23

## 2021-09-23 RX ORDER — CEFAZOLIN SODIUM 1 G/50ML
SOLUTION INTRAVENOUS AS NEEDED
Status: DISCONTINUED | OUTPATIENT
Start: 2021-09-23 | End: 2021-09-23

## 2021-09-23 RX ORDER — BUPIVACAINE HYDROCHLORIDE 5 MG/ML
INJECTION, SOLUTION PERINEURAL AS NEEDED
Status: DISCONTINUED | OUTPATIENT
Start: 2021-09-23 | End: 2021-09-23 | Stop reason: HOSPADM

## 2021-09-23 RX ORDER — FENTANYL CITRATE 50 UG/ML
INJECTION, SOLUTION INTRAMUSCULAR; INTRAVENOUS AS NEEDED
Status: DISCONTINUED | OUTPATIENT
Start: 2021-09-23 | End: 2021-09-23

## 2021-09-23 RX ORDER — KETOROLAC TROMETHAMINE 30 MG/ML
INJECTION, SOLUTION INTRAMUSCULAR; INTRAVENOUS AS NEEDED
Status: DISCONTINUED | OUTPATIENT
Start: 2021-09-23 | End: 2021-09-23

## 2021-09-23 RX ORDER — METOCLOPRAMIDE HYDROCHLORIDE 5 MG/ML
10 INJECTION INTRAMUSCULAR; INTRAVENOUS ONCE AS NEEDED
Status: DISCONTINUED | OUTPATIENT
Start: 2021-09-23 | End: 2021-09-23 | Stop reason: HOSPADM

## 2021-09-23 RX ADMIN — ONDANSETRON 4 MG: 2 INJECTION INTRAMUSCULAR; INTRAVENOUS at 08:26

## 2021-09-23 RX ADMIN — PROPOFOL 20 MG: 10 INJECTION, EMULSION INTRAVENOUS at 08:29

## 2021-09-23 RX ADMIN — SODIUM CHLORIDE, SODIUM LACTATE, POTASSIUM CHLORIDE, AND CALCIUM CHLORIDE: .6; .31; .03; .02 INJECTION, SOLUTION INTRAVENOUS at 08:23

## 2021-09-23 RX ADMIN — DEXAMETHASONE SODIUM PHOSPHATE 4 MG: 10 INJECTION, SOLUTION INTRAMUSCULAR; INTRAVENOUS at 08:26

## 2021-09-23 RX ADMIN — MIDAZOLAM HYDROCHLORIDE 2 MG: 1 INJECTION, SOLUTION INTRAMUSCULAR; INTRAVENOUS at 08:22

## 2021-09-23 RX ADMIN — PROPOFOL 180 MG: 10 INJECTION, EMULSION INTRAVENOUS at 08:26

## 2021-09-23 RX ADMIN — CEFAZOLIN SODIUM 1000 MG: 1 SOLUTION INTRAVENOUS at 08:31

## 2021-09-23 RX ADMIN — KETOROLAC TROMETHAMINE 30 MG: 30 INJECTION, SOLUTION INTRAMUSCULAR at 09:02

## 2021-09-23 RX ADMIN — FENTANYL CITRATE 25 MCG: 50 INJECTION, SOLUTION INTRAMUSCULAR; INTRAVENOUS at 08:40

## 2021-09-23 RX ADMIN — LIDOCAINE HYDROCHLORIDE 70 MG: 10 INJECTION, SOLUTION EPIDURAL; INFILTRATION; INTRACAUDAL; PERINEURAL at 08:26

## 2021-09-23 RX ADMIN — FENTANYL CITRATE 25 MCG: 50 INJECTION, SOLUTION INTRAMUSCULAR; INTRAVENOUS at 08:30

## 2021-09-23 NOTE — ANESTHESIA POSTPROCEDURE EVALUATION
Post-Op Assessment Note    CV Status:  Stable    Pain management: adequate     Mental Status:  Sleepy   Hydration Status:  Euvolemic   PONV Controlled:  Controlled   Airway Patency:  Patent      Post Op Vitals Reviewed: Yes      Staff: Anesthesiologist, CRNA         No complications documented      BP      Temp      Pulse     Resp      SpO2

## 2021-09-23 NOTE — OP NOTE
OPERATIVE REPORT  PATIENT NAME: Waldo Waller    :  1970  MRN: [id number]  Pt Location: [facility]    SURGERY DATE: 2021    Surgeon(s) and Role:     * Jaimie Mcmillan MD - Primary     * Eddie Bates MD - Assisting    Preop Diagnosis:  Left breast mass [N63 20]    Post-Op Diagnosis Codes:     * Left breast mass [N63 20]    Procedure(s) (LRB):  BREAST MASS EXCISION (Left)    Specimen(s):  ID Type Source Tests Collected by Time Destination   1 : Left chest wall mass Tissue Soft Tissue, Other TISSUE EXAM Jaimie Mcmillan MD 2021 [id number]        Estimated Blood Loss:   Minimal    Drains:  * No LDAs found *    Anesthesia Type:   General    Operative Indications:  Left breast mass [N63 20]      Operative Findings:  n/a    Complications:   None    Procedure and Technique:  Surekha Garcia is a 72-year-old female who presented with an increasing painful mass in the upper left breast/chest wall  She desired surgical excision  She was brought to the operating  She had general anesthesia  She was given preoperative antibiotics  She was prepped and draped in the usual standard fashion time-out was performed  Attention was turned to the for upper outer left breast/chest wall in the infraclavicular region  0 5% Marcaine plain was injected over the visible and palpable mass  An incision was created in this location  Electrocautery was used to dissect through the subcutaneous tissue down to the level of concern  This had the gross appearance of a lipoma this was excised completely and submitted to pathology in formalin  Her wound was irrigated and hemostasis was achieved  The wound was then closed in a layered fashion using multiple interrupted 3-0 Monocryl suture and a running 4-0 Monocryl subcuticular stitch  All counts were correct  The skin was cleaned and dried  Surgical glue, sterile gauze and Tegaderm dressing were applied  The patient was then extubated and taken to recovery in stable condition       I was present for the entire procedure    Patient Disposition:  extubated and stable    SIGNATURE: Diana Erickson MD  DATE: September 23, 2021  TIME: 9:03 AM

## 2021-09-23 NOTE — DISCHARGE INSTRUCTIONS
POST-OPERATIVE CARE INSTRUCTIONS       Care after your procedure:   General  · Rest and relax for 24 hours, then gradually return to normal activities  · Do not preform any heavy lifting or strenuous physical activities for 14 days  · Your activity restrictions will be re-evaluated at your post op visit  · Drink clear liquids until you are certain there is no nausea, then resume a normal diet  · Do not drink alcohol, drive any vehicle, operate mechanical equipment or make critical decisions for at least 24 hours and until you are off any narcotic pain medications  The Incision  · Your incision is closed with:   dissolvable stiches just underneath the skin  · The incision is also covered with:                          clear waterproof glue  · A gauze-pad is covering the wound  Wound care  · Remove your gauze-pad after 24 hours  · You may then shower using soap and water to clean your incision  Gently dry the wound  · You may redress your wound with additional gauze and tape if you choose  · A little bruising at the wound site is normal     Medication  · Resume all previous medications  · Take either Naproxen (Aleve) one tablet every 8 hours or Ibuprofen(Advil/Motrin) one(1) to two(2) tablets every 6 hours around the clock for the first 2-3 days  Take this even if you don't think you need it for at least the first 24 hours  · Pain Medication Instructions: may also use over the counter tylenol          Other (If applicable)  · May use ice to the incision site(s) for the next 24-48 hours, twice daily     Call your  doctor if you have any of the following:  · Redness, swelling, heat, drainage, and/or bleeding from your wound  · Chills or fever ( above 101' F )  · Pain, not relieved with the above medications  · If you have any questions or problems call our office [phone]    Follow-up appointment:  · As scheduled

## 2021-09-23 NOTE — ANESTHESIA PREPROCEDURE EVALUATION
Procedure:  BREAST MASS EXCISION (Left Breast)    Relevant Problems   ENDO   (+) Hypothyroidism      NEURO/PSYCH   (+) Vision disturbance        Physical Exam    Airway    Mallampati score: I  TM Distance: >3 FB  Neck ROM: full     Dental   No notable dental hx     Cardiovascular      Pulmonary      Other Findings        Anesthesia Plan  ASA Score- 2     Anesthesia Type- general with ASA Monitors  Additional Monitors:   Airway Plan: LMA  Plan Factors-Exercise tolerance (METS): >4 METS  Chart reviewed  EKG reviewed  Existing labs reviewed  Patient summary reviewed  Patient is not a current smoker  Induction- intravenous  Postoperative Plan- Plan for postoperative opioid use  Informed Consent- Anesthetic plan and risks discussed with patient  I personally reviewed this patient with the CRNA  Discussed and agreed on the Anesthesia Plan with the CRNA  Siri Navarrete

## 2021-10-07 ENCOUNTER — OFFICE VISIT (OUTPATIENT)
Dept: SURGICAL ONCOLOGY | Facility: CLINIC | Age: 51
End: 2021-10-07

## 2021-10-07 VITALS
HEART RATE: 62 BPM | BODY MASS INDEX: 26.68 KG/M2 | WEIGHT: 145 LBS | OXYGEN SATURATION: 99 % | SYSTOLIC BLOOD PRESSURE: 138 MMHG | TEMPERATURE: 98 F | RESPIRATION RATE: 16 BRPM | HEIGHT: 62 IN | DIASTOLIC BLOOD PRESSURE: 72 MMHG

## 2021-10-07 DIAGNOSIS — D17.1 LIPOMA OF ANTERIOR CHEST WALL: Primary | ICD-10-CM

## 2021-10-07 PROCEDURE — 99024 POSTOP FOLLOW-UP VISIT: CPT | Performed by: SURGERY

## 2021-10-07 PROCEDURE — 3008F BODY MASS INDEX DOCD: CPT | Performed by: ORTHOPAEDIC SURGERY

## 2021-12-28 ENCOUNTER — IMMUNIZATIONS (OUTPATIENT)
Dept: FAMILY MEDICINE CLINIC | Facility: HOSPITAL | Age: 51
End: 2021-12-28

## 2021-12-28 DIAGNOSIS — Z23 ENCOUNTER FOR IMMUNIZATION: Primary | ICD-10-CM

## 2021-12-28 PROCEDURE — 0001A COVID-19 PFIZER VACC 0.3 ML: CPT

## 2021-12-28 PROCEDURE — 91300 COVID-19 PFIZER VACC 0.3 ML: CPT

## 2022-01-14 ENCOUNTER — ANNUAL EXAM (OUTPATIENT)
Dept: OBGYN CLINIC | Facility: CLINIC | Age: 52
End: 2022-01-14
Payer: COMMERCIAL

## 2022-01-14 VITALS
SYSTOLIC BLOOD PRESSURE: 108 MMHG | DIASTOLIC BLOOD PRESSURE: 70 MMHG | WEIGHT: 140 LBS | HEIGHT: 62 IN | BODY MASS INDEX: 25.76 KG/M2

## 2022-01-14 DIAGNOSIS — Z01.419 ENCNTR FOR GYN EXAM (GENERAL) (ROUTINE) W/O ABN FINDINGS: Primary | ICD-10-CM

## 2022-01-14 DIAGNOSIS — Z01.419 PAP SMEAR, AS PART OF ROUTINE GYNECOLOGICAL EXAMINATION: ICD-10-CM

## 2022-01-14 DIAGNOSIS — Z12.31 ENCOUNTER FOR SCREENING MAMMOGRAM FOR MALIGNANT NEOPLASM OF BREAST: ICD-10-CM

## 2022-01-14 PROCEDURE — 99396 PREV VISIT EST AGE 40-64: CPT | Performed by: OBSTETRICS & GYNECOLOGY

## 2022-01-14 NOTE — PROGRESS NOTES
Assessment/Plan:    No problem-specific Assessment & Plan notes found for this encounter  Diagnoses and all orders for this visit:    Encntr for gyn exam (general) (routine) w/o abn findings  -     Thinprep Pap (Refl) HPV mRNA E6/E7    Pap smear, as part of routine gynecological examination    Encounter for screening mammogram for malignant neoplasm of breast  -     Mammo screening bilateral w 3d & cad; Future          Normal gynecological physical examination  Self-breast examination stressed  Mammogram ordered  Discussed regular exercise, healthy diet, importance of vitamin D and calcium supplements  Discussed importance of sun block use during periods of prolonged sun exposure  Patient will be seen in 1 year for routine gynecologic and medical examination  Patient will call office for any problems, concerns, or issues which may arise during the interim  Subjective:          HPI    Patient ID: John Virgen is a 49 y o  female who presents today for her annual gynecologic and medical examination    Menstrual status: Pt is perimenopausal  She reports that her last period was in April 2021  Before that period, they have becoming more sparse with spacing of 3-4 months between each one  Her periods have also become shorter and lighter lasting only 4-5 days with light flow  Pt denies cramping, headaches, pelvic/abdomnial pain, or spotting  Vasomotor symptoms: Pt reports that she is still getting hot flashes daily and is taking an over the counter medication to help alleviate  She reports they are tolerable      Patient reports normal appetite    Patient reports normal bowel and bladder habits    Patient denies any significant pelvic or abdominal pain    Patient denies any headaches, chest pain, shortness of breath fever shakes or chills    Patient denies any COVID 19 symptoms including cough or loss of taste or smell    COVID vaccine status: Fully vaccinated with booster    Medical problems: No new issues reported    Colonoscopy status: Up-to-date    Mammogram status: Pt had a lipoma removed from the left breast in October 2021  She reports that she has not had any issues since and continue to perform monthly exams  Pt has a mammogram scheduled for the end of the month  New screening mammogram order placed in EMR system  The following portions of the patient's history were reviewed and updated as appropriate: allergies, current medications, past family history, past medical history, past social history, past surgical history and problem list     Review of Systems   Constitutional: Negative  Negative for appetite change, diaphoresis, fatigue, fever and unexpected weight change  HENT: Negative  Eyes: Negative  Respiratory: Negative  Cardiovascular: Negative  Gastrointestinal: Negative  Negative for abdominal pain, blood in stool, constipation, diarrhea, nausea and vomiting  Endocrine: Negative  Negative for cold intolerance and heat intolerance  Followed for thyroid   Genitourinary: Negative  Negative for dysuria, frequency, hematuria, urgency, vaginal bleeding, vaginal discharge and vaginal pain  Musculoskeletal: Negative  Skin: Negative  Allergic/Immunologic: Negative  Neurological: Negative  Hematological: Negative  Negative for adenopathy  Psychiatric/Behavioral: Negative  Objective:      /70 (BP Location: Left arm, Patient Position: Sitting, Cuff Size: Standard)   Ht 5' 2" (1 575 m)   Wt 63 5 kg (140 lb)   BMI 25 61 kg/m²          Physical Exam  Constitutional:       Appearance: Normal appearance  She is well-developed  HENT:      Head: Normocephalic  Eyes:      Pupils: Pupils are equal, round, and reactive to light  Cardiovascular:      Rate and Rhythm: Normal rate and regular rhythm  Pulmonary:      Effort: Pulmonary effort is normal       Breath sounds: Normal breath sounds     Chest:   Breasts: Breasts are symmetrical  Right: No inverted nipple, mass, nipple discharge, skin change, tenderness or supraclavicular adenopathy  Left: No inverted nipple, mass, nipple discharge, skin change, tenderness or supraclavicular adenopathy  Comments: Pt followed by dermatology for suspicious mole at 8 o' clock on right breast  Pt had benign lipoma removed from left breast in October 2021  Abdominal:      General: Bowel sounds are normal       Palpations: Abdomen is soft  Genitourinary:     General: Normal vulva  Exam position: Supine  Labia:         Right: No rash, tenderness, lesion or injury  Left: No rash, tenderness, lesion or injury  Urethra: No urethral swelling or urethral lesion  Vagina: Normal  No erythema, tenderness or bleeding  Cervix: No discharge or friability  Uterus: Not enlarged, not fixed and not tender  Adnexa:         Right: No mass, tenderness or fullness  Left: No mass, tenderness or fullness  Rectum: Normal       Comments: Pelvic exam reveals minimal atrophic vaginitis  Good pelvic support confirmed  Musculoskeletal:         General: Normal range of motion  Cervical back: Normal range of motion and neck supple  Lymphadenopathy:      Cervical: No cervical adenopathy  Upper Body:      Right upper body: No supraclavicular adenopathy  Left upper body: No supraclavicular adenopathy  Skin:     General: Skin is warm and dry  Findings: No rash  Neurological:      General: No focal deficit present  Mental Status: She is alert and oriented to person, place, and time  Psychiatric:         Mood and Affect: Mood normal          Speech: Speech normal          Behavior: Behavior normal          Thought Content:  Thought content normal          Judgment: Judgment normal

## 2022-01-17 ENCOUNTER — OFFICE VISIT (OUTPATIENT)
Dept: INTERNAL MEDICINE CLINIC | Facility: CLINIC | Age: 52
End: 2022-01-17
Payer: COMMERCIAL

## 2022-01-17 VITALS
WEIGHT: 141 LBS | BODY MASS INDEX: 25.95 KG/M2 | HEIGHT: 62 IN | SYSTOLIC BLOOD PRESSURE: 102 MMHG | DIASTOLIC BLOOD PRESSURE: 68 MMHG | OXYGEN SATURATION: 99 % | HEART RATE: 67 BPM

## 2022-01-17 DIAGNOSIS — G56.01 RIGHT CARPAL TUNNEL SYNDROME: Primary | ICD-10-CM

## 2022-01-17 PROCEDURE — 99213 OFFICE O/P EST LOW 20 MIN: CPT | Performed by: NURSE PRACTITIONER

## 2022-01-17 NOTE — PROGRESS NOTES
Assessment/Plan:    Right carpal tunnel syndrome  Symptoms consistent with carpal tunnel   refer patient to orthopedic surgeon for evaluation since her job is very physical and involves a lot of fine motor movements and coordination  She may continue with the brace and start at home exercises and stretches       Diagnoses and all orders for this visit:    Right carpal tunnel syndrome  -     Ambulatory Referral to Orthopedic Surgery; Future          Subjective:      Patient ID: Madhu Pelayo is a 49 y o  female  Patient is here for Numbness in the right palm tingling and shooting pain going up to the forearm  She denies weakness with her  and dropping things  She has been wearing and hand brace which has been helping and she has been doing some stretches  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 and she is concerned that her symptoms will worsen        The following portions of the patient's history were reviewed and updated as appropriate: allergies, current medications, past family history, past medical history, past social history, past surgical history and problem list     Review of Systems   Constitutional: Negative  HENT: Negative  Eyes: Negative  Respiratory: Negative  Cardiovascular: Negative  Gastrointestinal: Negative  Musculoskeletal: Negative  Neurological: Negative  Objective:      /68   Pulse 67   Ht 5' 2" (1 575 m)   Wt 64 kg (141 lb)   SpO2 99%   BMI 25 79 kg/m²          Physical Exam  Vitals and nursing note reviewed  Constitutional:       Appearance: She is well-developed  HENT:      Head: Normocephalic and atraumatic  Right Ear: External ear normal       Left Ear: External ear normal       Nose: Nose normal    Eyes:      Conjunctiva/sclera: Conjunctivae normal       Pupils: Pupils are equal, round, and reactive to light  Cardiovascular:      Rate and Rhythm: Normal rate and regular rhythm     Pulmonary:      Effort: Pulmonary effort is normal       Breath sounds: Normal breath sounds  Abdominal:      General: Bowel sounds are normal       Palpations: Abdomen is soft  Musculoskeletal:         General: Normal range of motion  Arms:       Cervical back: Normal range of motion and neck supple  Skin:     General: Skin is warm and dry  Neurological:      Mental Status: She is alert and oriented to person, place, and time

## 2022-01-17 NOTE — PATIENT INSTRUCTIONS
Carpal Tunnel Syndrome Exercises   WHAT YOU NEED TO KNOW:   What do I need to know about carpal tunnel syndrome (CTS) exercises? CTS exercises can help relieve pain and increase your range of motion  Your healthcare provider or physical therapist can tell you how often to do the exercises  What exercises can I do? · Finger extensions:  Hold your fingers and thumb close together  Keep them straight  Put a rubber band around the outside of your fingers and thumb  Spread your fingers apart  Then slowly bring them together without letting the rubber band fall off  Repeat 40 times  · Wrist flexor stretch:  Hold your arm out straight  Grasp your fingers with your other hand  Slowly bend the fingers back (palm facing away) until you feel a stretch in your wrist  Hold for 10 seconds  Repeat 5 times  · Wrist extensor stretch:  Hold your arm out straight  Grasp your fingers with your other hand  Slowly bend the fingers and hand down (palm facing you) until you feel a stretch on top of your hand  Hold for 10 seconds  Repeat 5 times  · Wrist curls without weights:  Sit in a chair with your forearm resting on your thigh or a table  With your palm facing down, flex your wrist up 3 inches and slowly lower it  Turn your forearm over and repeat with your palm facing up  Do each exercise 20 times  · Shrugs:  Stand with your arms by your side  Lift your shoulders up to your ears and hold for 1 second  Then pull your shoulders back, pinching your shoulder blades together  Hold for 1 second  Then relax your shoulders  Repeat 20 times  CARE AGREEMENT:   You have the right to help plan your care  Learn about your health condition and how it may be treated  Discuss treatment options with your healthcare providers to decide what care you want to receive  You always have the right to refuse treatment  The above information is an  only   It is not intended as medical advice for individual conditions or treatments  Talk to your doctor, nurse or pharmacist before following any medical regimen to see if it is safe and effective for you  © Copyright [employer] 2021 Information is for End User's use only and may not be sold, redistributed or otherwise used for commercial purposes   All illustrations and images included in CareNotes® are the copyrighted property of A D A M , Inc  or [address] [place] [address]

## 2022-01-17 NOTE — ASSESSMENT & PLAN NOTE
Symptoms consistent with carpal tunnel   refer patient to orthopedic surgeon for evaluation since her job is very physical and involves a lot of fine motor movements and coordination    She may continue with the brace and start at home exercises and stretches

## 2022-01-20 ENCOUNTER — OFFICE VISIT (OUTPATIENT)
Dept: OBGYN CLINIC | Facility: CLINIC | Age: 52
End: 2022-01-20
Payer: COMMERCIAL

## 2022-01-20 VITALS — BODY MASS INDEX: 26.17 KG/M2 | HEIGHT: 62 IN | WEIGHT: 142.2 LBS

## 2022-01-20 DIAGNOSIS — G56.01 RIGHT CARPAL TUNNEL SYNDROME: ICD-10-CM

## 2022-01-20 PROCEDURE — 99213 OFFICE O/P EST LOW 20 MIN: CPT | Performed by: PHYSICIAN ASSISTANT

## 2022-01-20 PROCEDURE — 1036F TOBACCO NON-USER: CPT | Performed by: PHYSICIAN ASSISTANT

## 2022-01-20 PROCEDURE — 3008F BODY MASS INDEX DOCD: CPT | Performed by: PHYSICIAN ASSISTANT

## 2022-01-20 NOTE — PROGRESS NOTES
ASSESSMENT/PLAN:    Assessment:   Suspect this may have been CTS, but she is asymptomic now with a normal exam     Plan:   US right wrist if symptoms return  I will put the order in now, so if symptoms return, she can just call to schedule    Follow Up: After US with hand specialist if needed    To Do Next Visit:       General Discussions:       Operative Discussions:           _____________________________________________________  CHIEF COMPLAINT:  Chief Complaint   Patient presents with    Right Wrist - Carpal Tunnel         SUBJECTIVE:  Alex Montes De Oca is a 49 y o  female who is asymptomatic today  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  She was having pain and numbness in the volar wrist and thumb  Since then, she has used a brace and taken some time off  Now, her symptoms have resolved  Radiation: Yes to the  thumb  Previous Treatments: activity modification and bracing with relief  Handedness: right  Work status: Cookie decorator    PAST MEDICAL HISTORY:  Past Medical History:   Diagnosis Date    Disease of thyroid gland        PAST SURGICAL HISTORY:  Past Surgical History:   Procedure Laterality Date    BIOPSY CORE NEEDLE Right 01/10/2017    BIOPSY BREAST PERCUTANTOUS NEEDLE CORE    BREAST BIOPSY Right 01/10/2017     biopsy benign    BREAST LUMPECTOMY Left 2021    Procedure: BREAST MASS EXCISION;  Surgeon: Susan Morris MD;  [facility];  Service: Surgical Oncology     SECTION      DESC: 3/2000,10/2001,10/2005,2007     SECTION      LIPOMA RESECTION Left 2021    DE COLONOSCOPY FLX DX W/COLLJ SPEC WHEN PFRMD N/A 2018    Procedure: COLONOSCOPY;  Surgeon: Nettie Stringer MD;  [facility]: [facility];   Service: Gastroenterology    TUBAL LIGATION      US GUIDED BREAST BIOPSY RIGHT COMPLETE Right 1/10/2017    WISDOM TOOTH EXTRACTION         FAMILY HISTORY:  Family History   Problem Relation Age of Onset    Coronary artery disease Mother     Heart attack Mother     No Known Problems Father     Colon cancer Maternal Aunt         ? 29's    No Known Problems Daughter     No Known Problems Maternal Grandmother     No Known Problems Maternal Grandfather     No Known Problems Paternal Grandmother     No Known Problems Paternal Grandfather     No Known Problems Maternal Aunt     No Known Problems Paternal Aunt     Prostate cancer Maternal Uncle         70's       SOCIAL HISTORY:  Social History     Tobacco Use    Smoking status: Never Smoker    Smokeless tobacco: Never Used   Vaping Use    Vaping Use: Never used   Substance Use Topics    Alcohol use: Yes     Comment: 2-3 per month    Drug use: No       MEDICATIONS:    Current Outpatient Medications:     levothyroxine 50 mcg tablet, TAKE 1 TABLET DAILY, Disp: 90 tablet, Rfl: 3    ALLERGIES:  No Known Allergies    REVIEW OF SYSTEMS:  Pertinent items are noted in HPI  A comprehensive review of systems was negative      LABS:  HgA1c: No results found for: HGBA1C  BMP:   Lab Results   Component Value Date    GLUCOSE 96 06/30/2015    CALCIUM 9 6 09/03/2021     06/30/2015    K 4 1 09/03/2021    CO2 30 09/03/2021     09/03/2021    BUN 15 09/03/2021    CREATININE 0 84 09/03/2021         _____________________________________________________  PHYSICAL EXAMINATION:  Vital signs: Ht 5' 2" (1 575 m)   Wt 64 5 kg (142 lb 3 2 oz)   BMI 26 01 kg/m²   General: well developed and well nourished, alert, oriented times 3 and appears comfortable  Psychiatric: Normal  HEENT: Trachea Midline, No torticollis  Cardiovascular: No discernable arrhythmia  Pulmonary: No wheezing or stridor  Abdomen: No rebound or guarding  Extremities: No peripheral edema  Skin: No masses, erythema, lacerations, fluctation, ulcerations  Neurovascular: Sensation Intact to the Median, Ulnar, Radial Nerve, Motor Intact to the Median, Ulnar, Radial Nerve and Pulses Intact    MUSCULOSKELETAL EXAMINATION:  RIGHT SIDE:  CMC: Negative Shuck, Grind, Tenderness to [facility], No instability to MP joint and Carpal tunnel:  No tinel's, phalans, weakness, atrophy  No swelling or discoloration  NVI distally      _____________________________________________________  STUDIES REVIEWED:  No Studies to review      PROCEDURES PERFORMED:  Procedures  No Procedures performed today

## 2022-02-07 ENCOUNTER — HOSPITAL ENCOUNTER (OUTPATIENT)
Dept: RADIOLOGY | Age: 52
Discharge: HOME | End: 2022-02-07
Payer: COMMERCIAL

## 2022-02-07 VITALS — BODY MASS INDEX: 25.03 KG/M2 | WEIGHT: 136 LBS | HEIGHT: 62 IN

## 2022-02-07 DIAGNOSIS — Z12.31 ENCOUNTER FOR SCREENING MAMMOGRAM FOR MALIGNANT NEOPLASM OF BREAST: ICD-10-CM

## 2022-02-07 PROCEDURE — 77063 BREAST TOMOSYNTHESIS BI: CPT

## 2022-02-07 PROCEDURE — 77067 SCR MAMMO BI INCL CAD: CPT

## 2022-02-14 DIAGNOSIS — E03.9 HYPOTHYROIDISM, UNSPECIFIED TYPE: ICD-10-CM

## 2022-02-14 RX ORDER — LEVOTHYROXINE SODIUM 0.05 MG/1
TABLET ORAL
Qty: 90 TABLET | Refills: 3 | Status: SHIPPED | OUTPATIENT
Start: 2022-02-14

## 2022-03-28 ENCOUNTER — HOSPITAL ENCOUNTER (OUTPATIENT)
Dept: RADIOLOGY | Facility: HOSPITAL | Age: 52
Discharge: HOME | End: 2022-03-28
Admitting: RADIOLOGY
Payer: COMMERCIAL

## 2022-03-28 DIAGNOSIS — G56.01 RIGHT CARPAL TUNNEL SYNDROME: ICD-10-CM

## 2022-03-28 PROCEDURE — 76882 US LMTD JT/FCL EVL NVASC XTR: CPT

## 2022-04-06 ENCOUNTER — OFFICE VISIT (OUTPATIENT)
Dept: OBGYN CLINIC | Facility: CLINIC | Age: 52
End: 2022-04-06
Payer: COMMERCIAL

## 2022-04-06 VITALS
HEIGHT: 62 IN | HEART RATE: 63 BPM | DIASTOLIC BLOOD PRESSURE: 73 MMHG | SYSTOLIC BLOOD PRESSURE: 109 MMHG | WEIGHT: 139 LBS | BODY MASS INDEX: 25.58 KG/M2

## 2022-04-06 DIAGNOSIS — G56.01 CARPAL TUNNEL SYNDROME ON RIGHT: Primary | ICD-10-CM

## 2022-04-06 PROCEDURE — 3008F BODY MASS INDEX DOCD: CPT | Performed by: SURGERY

## 2022-04-06 PROCEDURE — 1036F TOBACCO NON-USER: CPT | Performed by: SURGERY

## 2022-04-06 PROCEDURE — 99214 OFFICE O/P EST MOD 30 MIN: CPT | Performed by: SURGERY

## 2022-04-06 NOTE — PROGRESS NOTES
Rosio JHA  Attending, Orthopaedic Surgery  Hand, Wrist, and Elbow Surgery  [facility]      ORTHOPAEDIC HAND, WRIST, AND ELBOW OFFICE  VISIT       ASSESSMENT/PLAN:      44-year-old female with right carpal tunnel syndrome diagnosed on ultrasound evaluation   Reviewed the anatomy and physiology of the above diagnosis along with treatment options including bracing, injections, and surgical release  Patient is having decent relief in symptoms bracing but feels that she would try an injection in the future if symptoms flare up  We briefly reviewed surgical release and associated recovery period  At this time patient elected to proceed conservatively with bracing and activity modifications, she states this summer is typically her slow was time slight anticipate symptoms will improve  She is welcome to return any time for steroid injection or surgical consideration      The patient verbalized understanding of exam findings and treatment plan  We engaged in the shared decision-making process and treatment options were discussed at length with the patient  Surgical and conservative management discussed today along with risks and benefits  Diagnoses and all orders for this visit:    Carpal tunnel syndrome on right        Follow Up:  Return if symptoms worsen or fail to improve  To Do Next Visit:  Re-evaluation of current issue      General Discussions:  Carpal Tunnel Syndrome: The anatomy and physiology of carpal tunnel syndrome was discussed with the patient today  Increase pressure localized under the transverse carpal ligament can cause pain, numbness, tingling, or dysesthesias within the median nerve distribution as well as feelings of fatigue, clumsiness, or awkwardness  These symptoms typically occur at night and worse in the morning upon waking    Eventually, untreated carpal tunnel syndrome can result in weakness and permanent loss of muscle within the thenar compartment of the hand  Treatment options were discussed with the patient  Conservative treatment includes nocturnal resting splints to keep the nerve in a neutral position, ergonomic changes within the work or home environment, activity modification, and tendon gliding exercises  Vitamin B6 one tablet daily over the counter may helpful to reduce symptoms  Steroid injections within the carpal canal can help a majority of patients, however this is often self-limited in a majority of patients  Surgical intervention to divide the transverse carpal ligament typically results in a long-lasting relief of the patient's complaints, with the recurrence rate of less than 1%                                                                                                                                                                                    ____________________________________________________________________________________________________________________________________________      CHIEF COMPLAINT:  Chief Complaint   Patient presents with    Right Wrist - Pain       SUBJECTIVE:  Brittany Giraldo is a 49y o  year old RHD female who presents for evaluation of the right hand  She states that back in December she started with severe pain and numbness in the hand that seemed to flare up with an increase in activity, 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  Since then symptoms have seemed to improve with nighttime bracing however she still does note symptoms intermittently  Ultrasound was ordered for evaluation of the nerve which demonstrate evidence of carpal tunnel syndrome  Patient denies any direct injury to the hand or wrist   She does have occasional thumb pains, no clicking or locking    No left-sided symptoms        Pain/symptom timing:  Worse during the day when active  Pain/symptom context:  Worse with activites and work  Pain/symptom modifying factors:  Rest makes better, activities make worse  Pain/symptom associated signs/symptoms: none    Prior treatment   · NSAIDsYes   · Injections No   · Bracing/Orthotics Yes    Physical Therapy No     I have personally reviewed all the relevant PMH, PSH, SH, FH, Medications and allergies      PAST MEDICAL HISTORY:  Past Medical History:   Diagnosis Date    Disease of thyroid gland        PAST SURGICAL HISTORY:  Past Surgical History:   Procedure Laterality Date    BIOPSY CORE NEEDLE Right 01/10/2017    BIOPSY BREAST PERCUTANTOUS NEEDLE CORE    BREAST BIOPSY Right 01/10/2017     biopsy benign    BREAST LUMPECTOMY Left 2021    Procedure: BREAST MASS EXCISION;  Surgeon: Tami Rubio MD;  [facility];  Service: Surgical Oncology     SECTION      DESC: 3/2000,10/2001,10/2005,2007     SECTION      LIPOMA RESECTION Left 2021    MO COLONOSCOPY FLX DX W/COLLJ SPEC WHEN PFRMD N/A 2018    Procedure: COLONOSCOPY;  Surgeon: Aminata Goetz MD;  [facility]; Service: Gastroenterology    TUBAL LIGATION      US GUIDED BREAST BIOPSY RIGHT COMPLETE Right 1/10/2017    WISDOM TOOTH EXTRACTION         FAMILY HISTORY:  Family History   Problem Relation Age of Onset    Coronary artery disease Mother     Heart attack Mother     No Known Problems Father     Colon cancer Maternal Aunt         ? 29's    No Known Problems Daughter     No Known Problems Maternal Grandmother     No Known Problems Maternal Grandfather     No Known Problems Paternal Grandmother     No Known Problems Paternal Grandfather     No Known Problems Maternal Aunt     No Known Problems Paternal Aunt     Prostate cancer Maternal Uncle         70's       SOCIAL HISTORY:  Social History     Tobacco Use    Smoking status: Never Smoker    Smokeless tobacco: Never Used   Vaping Use    Vaping Use: Never used   Substance Use Topics    Alcohol use: Yes     Comment: 2-3 per month    Drug use: No       MEDICATIONS:    Current Outpatient Medications:     levothyroxine 50 mcg tablet, TAKE 1 TABLET DAILY, Disp: 90 tablet, Rfl: 3    ALLERGIES:  No Known Allergies        REVIEW OF SYSTEMS:  Review of Systems   Constitutional: Negative for chills, fatigue and fever  HENT: Negative for hearing loss, nosebleeds and sore throat  Eyes: Negative for redness and visual disturbance  Respiratory: Negative for cough, shortness of breath and wheezing  Cardiovascular: Negative for chest pain, palpitations and leg swelling  Gastrointestinal: Negative for abdominal pain, nausea and vomiting  Endocrine: Negative for polydipsia and polyuria  Genitourinary: Negative for difficulty urinating, dysuria and hematuria  Musculoskeletal: Negative for arthralgias, joint swelling and myalgias  Skin: Negative for rash and wound  Neurological: Negative for speech difficulty, weakness, numbness and headaches  Psychiatric/Behavioral: Negative for decreased concentration and suicidal ideas  The patient is not nervous/anxious          VITALS:  Vitals:    04/06/22 1156   BP: 109/73   Pulse: 63       LABS:  HgA1c: No results found for: HGBA1C  BMP:   Lab Results   Component Value Date    GLUCOSE 96 06/30/2015    CALCIUM 9 6 09/03/2021     06/30/2015    K 4 1 09/03/2021    CO2 30 09/03/2021     09/03/2021    BUN 15 09/03/2021    CREATININE 0 84 09/03/2021       _____________________________________________________  PHYSICAL EXAMINATION:  General: well developed and well nourished, alert, oriented times 3 and appears comfortable  Psychiatric: Normal  HEENT: Normocephalic, Atraumatic Trachea Midline, No torticollis  Pulmonary: No audible wheezing or respiratory distress   Abdomen/GI: Non tender, non distended   Cardiovascular: No pitting edema, 2+ radial pulse   Skin: No masses, erythema, lacerations, fluctation, ulcerations  Neurovascular: Sensation Intact to the Median, Ulnar, Radial Nerve, Motor Intact to the Median, Ulnar, Radial Nerve and Pulses Intact  Musculoskeletal: Normal, except as noted in detailed exam and in HPI  MUSCULOSKELETAL EXAMINATION:    Right Carpal Tunnel Exam:    Negative thenar atrophy  Negative phalen's test  Negative carpal tunnel compression  Negative tinels over median nerve at the wrist   Opposition strength 5/5  Abduction strength 5/5        No significant pain at [facility]    ___________________________________________________  STUDIES REVIEWED:  I have personally reviewed US of the right hand which demonstrate findings suspicious of carpal tunnel syndrome            PROCEDURES PERFORMED:  Procedures  No Procedures performed today    _____________________________________________________      Ed Cristobal    I,:  Nara Wynne PA-C am acting as a scribe while in the presence of the attending physician :       I,:  Andra Tim MD personally performed the services described in this documentation    as scribed in my presence :

## 2022-08-01 ENCOUNTER — RA CDI HCC (OUTPATIENT)
Dept: OTHER | Facility: HOSPITAL | Age: 52
End: 2022-08-01

## 2022-08-01 NOTE — PROGRESS NOTES
[facility]  coding opportunities       Chart reviewed, no opportunity found: CHART REVIEWED, NO OPPORTUNITY FOUND        Patients Insurance        Commercial Insurance: [employer]

## 2022-08-08 ENCOUNTER — APPOINTMENT (OUTPATIENT)
Dept: LAB | Facility: CLINIC | Age: 52
End: 2022-08-08
Payer: COMMERCIAL

## 2022-08-08 ENCOUNTER — OFFICE VISIT (OUTPATIENT)
Dept: INTERNAL MEDICINE CLINIC | Facility: CLINIC | Age: 52
End: 2022-08-08
Payer: COMMERCIAL

## 2022-08-08 VITALS
DIASTOLIC BLOOD PRESSURE: 78 MMHG | HEART RATE: 60 BPM | SYSTOLIC BLOOD PRESSURE: 112 MMHG | OXYGEN SATURATION: 98 % | HEIGHT: 63 IN | WEIGHT: 143 LBS | BODY MASS INDEX: 25.34 KG/M2

## 2022-08-08 DIAGNOSIS — R89.9 ABNORMAL LABORATORY TEST RESULT: Primary | ICD-10-CM

## 2022-08-08 DIAGNOSIS — Z00.00 ANNUAL PHYSICAL EXAM: Primary | ICD-10-CM

## 2022-08-08 DIAGNOSIS — E03.9 HYPOTHYROIDISM, UNSPECIFIED TYPE: ICD-10-CM

## 2022-08-08 DIAGNOSIS — Z13.6 SCREENING FOR CARDIOVASCULAR CONDITION: ICD-10-CM

## 2022-08-08 DIAGNOSIS — Z00.00 WELLNESS EXAMINATION: ICD-10-CM

## 2022-08-08 LAB
ALBUMIN SERPL BCP-MCNC: 4.4 G/DL (ref 3.5–5)
ALP SERPL-CCNC: 62 U/L (ref 34–104)
ALT SERPL W P-5'-P-CCNC: 14 U/L (ref 7–52)
ANION GAP SERPL CALCULATED.3IONS-SCNC: 3 MMOL/L (ref 4–13)
AST SERPL W P-5'-P-CCNC: 20 U/L (ref 13–39)
BILIRUB SERPL-MCNC: 0.83 MG/DL (ref 0.2–1)
BUN SERPL-MCNC: 11 MG/DL (ref 5–25)
CALCIUM SERPL-MCNC: 9.6 MG/DL (ref 8.4–10.2)
CHLORIDE SERPL-SCNC: 104 MMOL/L (ref 96–108)
CHOLEST SERPL-MCNC: 180 MG/DL
CO2 SERPL-SCNC: 32 MMOL/L (ref 21–32)
CREAT SERPL-MCNC: 0.77 MG/DL (ref 0.6–1.3)
GFR SERPL CREATININE-BSD FRML MDRD: 89 ML/MIN/1.73SQ M
GLUCOSE P FAST SERPL-MCNC: 88 MG/DL (ref 65–99)
HDLC SERPL-MCNC: 66 MG/DL
LDLC SERPL CALC-MCNC: 100 MG/DL (ref 0–100)
POTASSIUM SERPL-SCNC: 4.5 MMOL/L (ref 3.5–5.3)
PROT SERPL-MCNC: 6.8 G/DL (ref 6.4–8.4)
SODIUM SERPL-SCNC: 139 MMOL/L (ref 135–147)
TRIGL SERPL-MCNC: 71 MG/DL
TSH SERPL DL<=0.05 MIU/L-ACNC: 2.57 UIU/ML (ref 0.45–4.5)

## 2022-08-08 PROCEDURE — 36415 COLL VENOUS BLD VENIPUNCTURE: CPT

## 2022-08-08 PROCEDURE — 99396 PREV VISIT EST AGE 40-64: CPT | Performed by: INTERNAL MEDICINE

## 2022-08-08 PROCEDURE — 80061 LIPID PANEL: CPT

## 2022-08-08 PROCEDURE — 80053 COMPREHEN METABOLIC PANEL: CPT

## 2022-08-08 PROCEDURE — 84443 ASSAY THYROID STIM HORMONE: CPT

## 2022-08-08 PROCEDURE — 3725F SCREEN DEPRESSION PERFORMED: CPT | Performed by: INTERNAL MEDICINE

## 2022-08-08 NOTE — PATIENT INSTRUCTIONS

## 2022-08-08 NOTE — ASSESSMENT & PLAN NOTE
Assessment and plan 1  Health maintenance annual wellness examination overall the patient is clinically stable and doing well, we encouraged the patient to follow a healthy and balanced diet  We recommend that the patient exercise routinely approximately 30 minutes 5 times per week   We have reviewed the patient's vaccines and have made recommendations for updates if necessary  annual flu shot      We will be ordering screening laboratories which are age appropriate  Return to the office in   1 year   call if any problems

## 2022-08-08 NOTE — PROGRESS NOTES
ADULT ANNUAL PHYSICAL  [facility] OF [address] [place] [address]    NAME: Aleja Garcia  AGE: 47 y o  SEX: female  : 1970     DATE: 2022     Assessment and Plan:     Problem List Items Addressed This Visit        Endocrine    Hypothyroidism     Hypothyroidism controlled the patient is currently euthyroid I will be ordering a TSH prior to the next office visit and the patient will continue with current medical regiment; we will continue to monitor the patient's progress  Relevant Orders    TSH, 3rd generation (Completed)       Other    Wellness examination     Assessment and plan 1  Health maintenance annual wellness examination overall the patient is clinically stable and doing well, we encouraged the patient to follow a healthy and balanced diet  We recommend that the patient exercise routinely approximately 30 minutes 5 times per week   We have reviewed the patient's vaccines and have made recommendations for updates if necessary  annual flu shot      We will be ordering screening laboratories which are age appropriate  Return to the office in   1 year   call if any problems  Screening for cardiovascular condition    Relevant Orders    Comprehensive metabolic panel (Completed)    Lipid Panel with Direct LDL reflex (Completed)      Other Visit Diagnoses     Annual physical exam    -  Primary          Immunizations and preventive care screenings were discussed with patient today  Appropriate education was printed on patient's after visit summary  Counseling:  Exercise: the importance of regular exercise/physical activity was discussed  Recommend exercise 3-5 times per week for at least 30 minutes  BMI Counseling: Body mass index is 25 69 kg/m²  The BMI is above normal  Nutrition recommendations include decreasing portion sizes and moderation in carbohydrate intake  Exercise recommendations include exercising 3-5 times per week   Rationale for BMI follow-up plan is due to patient being overweight or obese  Depression Screening and Follow-up Plan: Patient was screened for depression during today's encounter  They screened negative with a PHQ-2 score of 0  Return in about 1 year (around 8/8/2023)  Chief Complaint:     Chief Complaint   Patient presents with    Physical Exam      History of Present Illness:     Adult Annual Physical   Patient here for a comprehensive physical exam  The patient reports no problems  Diet and Physical Activity  Diet/Nutrition: well balanced diet  Exercise: moderate cardiovascular exercise  Depression Screening  PHQ-2/9 Depression Screening    Little interest or pleasure in doing things: 0 - not at all  Feeling down, depressed, or hopeless: 0 - not at all  PHQ-2 Score: 0  PHQ-2 Interpretation: Negative depression screen       General Health  Sleep: sleeps well  6-7  Hearing: normal - bilateral   Vision: goes for regular eye exams  Dental: regular dental visits and no dental visits for >1 year  /GYN Health  Patient is: postmenopausal  Last menstrual period:   Contraceptive method:      Review of Systems:     Review of Systems   Constitutional: Negative for activity change, appetite change and unexpected weight change  HENT: Negative for congestion  Eyes: Negative for visual disturbance  Respiratory: Negative for cough and shortness of breath  Cardiovascular: Negative for chest pain  Gastrointestinal: Negative for abdominal pain, diarrhea, nausea and vomiting  Neurological: Negative for dizziness, light-headedness and headaches        Past Medical History:     Past Medical History:   Diagnosis Date    Disease of thyroid gland       Past Surgical History:     Past Surgical History:   Procedure Laterality Date    BIOPSY CORE NEEDLE Right 01/10/2017    BIOPSY BREAST PERCUTANTOUS NEEDLE CORE    BREAST BIOPSY Right 01/10/2017     biopsy benign    BREAST LUMPECTOMY Left 9/23/2021 Procedure: BREAST MASS EXCISION;  Surgeon: Franchesca Fine MD;  [facility];  Service: Surgical Oncology     SECTION      DESC: 3/2000,10/2001,10/2005,2007     SECTION      LIPOMA RESECTION Left 2021    [place] COLONOSCOPY FLX DX W/COLLJ SPEC WHEN PFRMD N/A 2018    Procedure: COLONOSCOPY;  Surgeon: Radha Rdz MD;  [facility]; Service: Gastroenterology    TUBAL LIGATION      US GUIDED BREAST BIOPSY RIGHT COMPLETE Right 1/10/2017    WISDOM TOOTH EXTRACTION        Social History:     Social History     Socioeconomic History    Marital status: /Civil Union     Spouse name: None    Number of children: None    Years of education: 2ND YEAR COLLEGE    Highest education level: None   Occupational History    Occupation: HOMEMAKER   Tobacco Use    Smoking status: Never Smoker    Smokeless tobacco: Never Used   Vaping Use    Vaping Use: Never used   Substance and Sexual Activity    Alcohol use: Yes     Alcohol/week: 1 0 standard drink     Types: 1 Glasses of wine per week    Drug use: No    Sexual activity: Yes     Comment: USES BIRTH CONTROL   Other Topics Concern    None   Social History Narrative    DAILY COFFEE CONSUMPTION 2 CUPS A DAY    DENIED---- HOME ENVIRONMENT DOMESTIC VIOLENCE    LIVING INDEPENDENTLY WITH SPOUSE     Social Determinants of Health     Financial Resource Strain: Not on file   Food Insecurity: Not on file   Transportation Needs: Not on file   Physical Activity: Not on file   Stress: Not on file   Social Connections: Not on file   Intimate Partner Violence: Not on file   Housing Stability: Not on file      Family History:     Family History   Problem Relation Age of Onset    Coronary artery disease Mother     Heart attack Mother     No Known Problems Father     Colon cancer Maternal Aunt         ? 29's    No Known Problems Daughter     No Known Problems Maternal Grandmother     No Known Problems Maternal Grandfather     No Known Problems Paternal Grandmother     No Known Problems Paternal Grandfather     No Known Problems Maternal Aunt     No Known Problems Paternal Aunt     Prostate cancer Maternal Uncle         70's      Current Medications:     Current Outpatient Medications   Medication Sig Dispense Refill    levothyroxine 50 mcg tablet TAKE 1 TABLET DAILY 90 tablet 3     No current facility-administered medications for this visit  Allergies:     No Known Allergies   Physical Exam:     /78   Pulse 60   Ht 5' 2 56" (1 589 m)   Wt 64 9 kg (143 lb)   SpO2 98%   BMI 25 69 kg/m²     Physical Exam  Constitutional:       Appearance: She is well-developed  HENT:      Head: Normocephalic and atraumatic  Right Ear: External ear normal       Left Ear: External ear normal       Nose: Nose normal    Eyes:      Pupils: Pupils are equal, round, and reactive to light  Cardiovascular:      Rate and Rhythm: Normal rate and regular rhythm  Heart sounds: Normal heart sounds  No murmur heard  Pulmonary:      Effort: Pulmonary effort is normal       Breath sounds: Normal breath sounds  Abdominal:      General: There is no distension  Palpations: Abdomen is soft  Tenderness: There is no abdominal tenderness  There is no guarding            Makenzie Valdez, DO  [facility]

## 2022-08-09 ENCOUNTER — APPOINTMENT (OUTPATIENT)
Dept: LAB | Facility: CLINIC | Age: 52
End: 2022-08-09
Payer: COMMERCIAL

## 2022-08-09 DIAGNOSIS — R89.9 ABNORMAL LABORATORY TEST RESULT: ICD-10-CM

## 2022-08-09 PROCEDURE — 84166 PROTEIN E-PHORESIS/URINE/CSF: CPT | Performed by: PATHOLOGY

## 2022-08-09 PROCEDURE — 84166 PROTEIN E-PHORESIS/URINE/CSF: CPT

## 2022-08-09 PROCEDURE — 84165 PROTEIN E-PHORESIS SERUM: CPT

## 2022-08-09 PROCEDURE — 84165 PROTEIN E-PHORESIS SERUM: CPT | Performed by: PATHOLOGY

## 2022-08-09 PROCEDURE — 36415 COLL VENOUS BLD VENIPUNCTURE: CPT

## 2022-08-11 LAB
ALBUMIN SERPL ELPH-MCNC: 4.31 G/DL (ref 3.5–5)
ALBUMIN SERPL ELPH-MCNC: 64.4 % (ref 52–65)
ALBUMIN UR ELPH-MCNC: 100 %
ALPHA1 GLOB MFR UR ELPH: 0 %
ALPHA1 GLOB SERPL ELPH-MCNC: 0.26 G/DL (ref 0.1–0.4)
ALPHA1 GLOB SERPL ELPH-MCNC: 3.9 % (ref 2.5–5)
ALPHA2 GLOB MFR UR ELPH: 0 %
ALPHA2 GLOB SERPL ELPH-MCNC: 0.62 G/DL (ref 0.4–1.2)
ALPHA2 GLOB SERPL ELPH-MCNC: 9.2 % (ref 7–13)
B-GLOBULIN MFR UR ELPH: 0 %
BETA GLOB ABNORMAL SERPL ELPH-MCNC: 0.36 G/DL (ref 0.4–0.8)
BETA1 GLOB SERPL ELPH-MCNC: 5.3 % (ref 5–13)
BETA2 GLOB SERPL ELPH-MCNC: 3.9 % (ref 2–8)
BETA2+GAMMA GLOB SERPL ELPH-MCNC: 0.26 G/DL (ref 0.2–0.5)
GAMMA GLOB ABNORMAL SERPL ELPH-MCNC: 0.89 G/DL (ref 0.5–1.6)
GAMMA GLOB MFR UR ELPH: 0 %
GAMMA GLOB SERPL ELPH-MCNC: 13.3 % (ref 12–22)
IGG/ALB SER: 1.81 {RATIO} (ref 1.1–1.8)
PROT PATTERN SERPL ELPH-IMP: ABNORMAL
PROT PATTERN UR ELPH-IMP: NORMAL
PROT SERPL-MCNC: 6.7 G/DL (ref 6.4–8.2)
PROT UR-MCNC: 6 MG/DL

## 2023-01-27 ENCOUNTER — APPOINTMENT (OUTPATIENT)
Dept: LAB | Facility: CLINIC | Age: 53
End: 2023-01-27

## 2023-01-27 ENCOUNTER — ANNUAL EXAM (OUTPATIENT)
Dept: OBGYN CLINIC | Facility: CLINIC | Age: 53
End: 2023-01-27

## 2023-01-27 VITALS
HEIGHT: 63 IN | WEIGHT: 152.4 LBS | DIASTOLIC BLOOD PRESSURE: 80 MMHG | BODY MASS INDEX: 27 KG/M2 | SYSTOLIC BLOOD PRESSURE: 118 MMHG

## 2023-01-27 DIAGNOSIS — N95.2 ATROPHIC VAGINITIS: ICD-10-CM

## 2023-01-27 DIAGNOSIS — N91.2 ABSENCE OF MENSTRUATION: ICD-10-CM

## 2023-01-27 DIAGNOSIS — Z01.419 ENCOUNTER FOR GYNECOLOGICAL EXAMINATION WITHOUT ABNORMAL FINDING: Primary | ICD-10-CM

## 2023-01-27 DIAGNOSIS — Z12.31 ENCOUNTER FOR SCREENING MAMMOGRAM FOR MALIGNANT NEOPLASM OF BREAST: ICD-10-CM

## 2023-01-27 DIAGNOSIS — Z01.419 PAP SMEAR, AS PART OF ROUTINE GYNECOLOGICAL EXAMINATION: ICD-10-CM

## 2023-01-27 DIAGNOSIS — N91.2 AMENORRHEA: ICD-10-CM

## 2023-01-27 LAB — FSH SERPL-ACNC: 60.3 MIU/ML

## 2023-01-27 NOTE — PROGRESS NOTES
Assessment/Plan:    No problem-specific Assessment & Plan notes found for this encounter  Diagnoses and all orders for this visit:    Encounter for gynecological examination without abnormal finding  -     Thinprep Tis Pap Reflex HPV mRNA E6/E7    Pap smear, as part of routine gynecological examination    Encounter for screening mammogram for malignant neoplasm of breast  -     Mammo screening bilateral w 3d & cad; Future    Atrophic vaginitis    Amenorrhea  -     Follicle stimulating hormone; Future  -     Follicle stimulating hormone          Normal gynecological physical examination  Self-breast examination stressed  Mammogram ordered  Will order [facility] and follow up with result  Discussed regular exercise, healthy diet, importance of vitamin D and calcium supplements  Discussed importance of sun block use during periods of prolonged sun exposure  Patient will be seen in 1 year for routine gynecologic and medical examination  Patient will call office for any problems, concerns, or issues which may arise during the interim  Subjective:   Patient presents to office for annual wellness exam   Patient has no complaints at today's visit  HPI  Patient ID: Candido Poe is a 47 y o  female who presents today for her annual gynecologic and medical examination    Menstrual status: Postmenopausal   Patient states she has not had her period for almost 2 years  Patient denies vaginal bleeding  Will order [facility] level  Vasomotor symptoms: Patient reports hot flashes which she manages with an OTC Estroven  Patient education was provided to either use Estroven, or pure black cohosh, or pure oil of primrose to help vasomotor symptoms      Patient reports normal appetite    Patient reports normal bowel and bladder habits    Patient denies any significant pelvic or abdominal pain    Patient denies any headaches, chest pain, shortness of breath fever shakes or chills    Patient denies any COVID 19 symptoms including cough or loss of taste or smell    COVID vaccine status: 2 COVID vaccines and 1 booster  Medical problems: Follows with PCP regularly  Followed for thyroid  Colonoscopy status: Patient had colonoscopy and will obtain next colonoscopy in 10 years  Mammogram status: Will obtain updated mammogram this year  Importance of regular self breast exams stressed to patient  The following portions of the patient's history were reviewed and updated as appropriate: allergies, current medications, past family history, past medical history, past social history, past surgical history and problem list     Review of Systems   Constitutional: Negative  Negative for appetite change, diaphoresis, fatigue, fever and unexpected weight change  HENT: Negative  Eyes: Negative  Respiratory: Negative  Cardiovascular: Negative  Gastrointestinal: Negative  Negative for abdominal pain, blood in stool, constipation, diarrhea, nausea and vomiting  Endocrine: Negative  Negative for cold intolerance and heat intolerance  Followed for thyroid   Genitourinary: Negative  Negative for dysuria, frequency, hematuria, urgency, vaginal bleeding, vaginal discharge and vaginal pain  Musculoskeletal: Negative  Skin: Negative  Allergic/Immunologic: Negative  Neurological: Negative  Hematological: Negative  Negative for adenopathy  Psychiatric/Behavioral: Negative  Objective:  /80   Ht 5' 2 56" (1 589 m)   Wt 69 1 kg (152 lb 6 4 oz)   LMP 09/04/2020 (Exact Date)   BMI 27 38 kg/m²      Physical Exam  Constitutional:       General: She is not in acute distress  Appearance: Normal appearance  She is well-developed  She is not diaphoretic  HENT:      Head: Normocephalic and atraumatic  Eyes:      Pupils: Pupils are equal, round, and reactive to light  Cardiovascular:      Rate and Rhythm: Normal rate and regular rhythm  Heart sounds: Normal heart sounds   No murmur heard     No friction rub  No gallop  Pulmonary:      Effort: Pulmonary effort is normal       Breath sounds: Normal breath sounds  Chest:   Breasts:     Breasts are symmetrical       Right: No inverted nipple, mass, nipple discharge, skin change or tenderness  Left: No inverted nipple, mass, nipple discharge, skin change or tenderness  Abdominal:      General: Bowel sounds are normal       Palpations: Abdomen is soft  Genitourinary:     Exam position: Supine  Labia:         Right: No rash or lesion  Left: No rash or lesion  Urethra: No urethral swelling or urethral lesion  Vagina: Normal  No vaginal discharge, erythema, tenderness or bleeding  Cervix: No discharge or friability  Uterus: Not enlarged and not tender  Adnexa:         Right: No mass, tenderness or fullness  Left: No mass, tenderness or fullness  Rectum: Normal  Guaiac result negative  Comments: Pelvic exam revealed good support  Mild atrophic vaginitis appreciated on exam   Musculoskeletal:         General: Normal range of motion  Cervical back: Normal range of motion and neck supple  Lymphadenopathy:      Cervical: No cervical adenopathy  Upper Body:      Right upper body: No supraclavicular adenopathy  Left upper body: No supraclavicular adenopathy  Skin:     General: Skin is warm and dry  Findings: No rash  Neurological:      General: No focal deficit present  Mental Status: She is alert and oriented to person, place, and time  Psychiatric:         Mood and Affect: Mood normal          Speech: Speech normal          Behavior: Behavior normal          Thought Content:  Thought content normal          Judgment: Judgment normal

## 2023-01-27 NOTE — PATIENT INSTRUCTIONS
Normal gynecological physical examination  Self-breast examination stressed  [address] ordered and will follow up with patient regarding result  Mammogram ordered  Discussed regular exercise, healthy diet, importance of vitamin D and calcium supplements  Discussed importance of sun block use during periods of prolonged sun exposure  Patient will be seen in 1 year for routine gynecologic and medical examination  Patient will call office for any problems, concerns, or issues which may arise during the interim

## 2023-01-31 ENCOUNTER — TELEPHONE (OUTPATIENT)
Dept: OBGYN CLINIC | Facility: CLINIC | Age: 53
End: 2023-01-31

## 2023-01-31 NOTE — TELEPHONE ENCOUNTER
----- Message from Kayla Granado MD sent at 1/31/2023 10:41 AM EST -----  Please tell that her [address] [place] [address] value of 60 indicates that she is menopausal consistent with not having a period for 2 years    Thanks

## 2023-02-03 LAB
CLINICAL INFO: NORMAL
CYTO CVX: NORMAL
CYTOLOGY CMNT CVX/VAG CYTO-IMP: NORMAL
DATE PREVIOUS BX: NORMAL
LMP START DATE: NORMAL
SL AMB PREV. PAP:: NORMAL
SPECIMEN SOURCE CVX/VAG CYTO: NORMAL

## 2023-02-07 DIAGNOSIS — E03.9 HYPOTHYROIDISM, UNSPECIFIED TYPE: ICD-10-CM

## 2023-02-07 RX ORDER — LEVOTHYROXINE SODIUM 0.05 MG/1
TABLET ORAL
Qty: 90 TABLET | Refills: 3 | Status: SHIPPED | OUTPATIENT
Start: 2023-02-07

## 2023-02-08 ENCOUNTER — HOSPITAL ENCOUNTER (OUTPATIENT)
Dept: MAMMOGRAPHY | Facility: HOSPITAL | Age: 53
Discharge: HOME | End: 2023-02-08
Attending: OBSTETRICS & GYNECOLOGY

## 2023-02-08 VITALS — BODY MASS INDEX: 26.99 KG/M2 | WEIGHT: 152.34 LBS | HEIGHT: 63 IN

## 2023-02-08 DIAGNOSIS — Z12.31 SCREENING MAMMOGRAM, ENCOUNTER FOR: ICD-10-CM

## 2023-08-09 ENCOUNTER — HOSPITAL ENCOUNTER (OUTPATIENT)
Dept: ULTRASOUND IMAGING | Facility: HOSPITAL | Age: 53
Discharge: HOME | End: 2023-08-09
Payer: COMMERCIAL

## 2023-08-09 ENCOUNTER — OFFICE VISIT (OUTPATIENT)
Dept: INTERNAL MEDICINE CLINIC | Facility: CLINIC | Age: 53
End: 2023-08-09
Payer: COMMERCIAL

## 2023-08-09 VITALS
WEIGHT: 148.4 LBS | HEIGHT: 62 IN | DIASTOLIC BLOOD PRESSURE: 78 MMHG | BODY MASS INDEX: 27.31 KG/M2 | HEART RATE: 77 BPM | SYSTOLIC BLOOD PRESSURE: 118 MMHG | OXYGEN SATURATION: 99 %

## 2023-08-09 DIAGNOSIS — M77.11 LATERAL EPICONDYLITIS OF RIGHT ELBOW: ICD-10-CM

## 2023-08-09 DIAGNOSIS — E04.9 ENLARGED THYROID: ICD-10-CM

## 2023-08-09 DIAGNOSIS — Z13.6 SCREENING FOR CARDIOVASCULAR CONDITION: ICD-10-CM

## 2023-08-09 DIAGNOSIS — Z00.00 WELLNESS EXAMINATION: Primary | ICD-10-CM

## 2023-08-09 DIAGNOSIS — E03.9 HYPOTHYROIDISM, UNSPECIFIED TYPE: ICD-10-CM

## 2023-08-09 PROCEDURE — 76536 US EXAM OF HEAD AND NECK: CPT

## 2023-08-09 PROCEDURE — 99213 OFFICE O/P EST LOW 20 MIN: CPT | Performed by: INTERNAL MEDICINE

## 2023-08-09 PROCEDURE — 99396 PREV VISIT EST AGE 40-64: CPT | Performed by: INTERNAL MEDICINE

## 2023-08-09 NOTE — PROGRESS NOTES
ADULT ANNUAL PHYSICAL  [address] [facility] [employer] [facility]    NAME: Dudley Garcia  AGE: 47 y.o. SEX: female  : 1970     DATE: 2023     Assessment and Plan:     Problem List Items Addressed This Visit        Endocrine    Hypothyroidism     Hypothyroidism controlled the patient is currently euthyroid I will be ordering a TSH prior to the next office visit and the patient will continue with current medical regiment; we will continue to monitor the patient's progress. Continue levothyroxine 50 mcg once daily         Relevant Orders    TSH, 3rd generation (Completed)    Enlarged thyroid     Enlargement of the thyroid gland left side on examination check ultrasound for further evaluation         Relevant Orders    TSH, 3rd generation (Completed)    US thyroid       Musculoskeletal and Integument    Lateral epicondylitis of right elbow     Recommend ice, Voltaren gel 4 times daily. ,  Will start physical therapy for [employer]         Relevant Medications    Diclofenac Sodium (VOLTAREN) 1 %    Other Relevant Orders    Ambulatory Referral to Physical Therapy       Other    Wellness examination - Primary     Assessment and plan 1. Health maintenance annual wellness examination overall the patient is clinically stable and doing well, we encouraged the patient to follow a healthy and balanced diet. We recommend that the patient exercise routinely approximately 30 minutes 5 times per week . We have reviewed the patient's vaccines and have made recommendations for updates if necessary   annual flu shot in the fall   . We will be ordering screening laboratories which are age appropriate. Return to the office in 1 year    call if any problems.          Screening for cardiovascular condition    Relevant Orders    Comprehensive metabolic panel (Completed)    Lipid Panel with Direct LDL reflex (Completed)     RTO in 1 year call with any problems  Immunizations and preventive care screenings were discussed with patient today. Appropriate education was printed on patient's after visit summary. Hot flashes last 2 min saw gyn Dr Rock Lydia thakkar  , reports right elbow pain which radiates down the arm, right handed   Counseling:  Exercise: the importance of regular exercise/physical activity was discussed. Recommend exercise 3-5 times per week for at least 30 minutes. BMI Counseling: Body mass index is 27.14 kg/m². The BMI is above normal. Nutrition recommendations include decreasing portion sizes and moderation in carbohydrate intake. Exercise recommendations include exercising 3-5 times per week. Rationale for BMI follow-up plan is due to patient being overweight or obese. Depression Screening and Follow-up Plan: Patient was screened for depression during today's encounter. They screened negative with a PHQ-2 score of 0. Return in about 1 year (around 8/9/2024). Chief Complaint:     Chief Complaint   Patient presents with   • Physical Exam      History of Present Illness:     Adult Annual Physical   Patient here for a comprehensive physical exam.  54-year old female coming in for a follow up office visit regarding hypothyroidism, lateral epicondylitis right elbow and enlarged thyroid; the patient reports me compliant taking medications without untoward side effects the. The patient is here to review his medical condition, update me on the medical condition and the patient reports me no hospitalizations and no ER visits. No injuries no illnesses overall doing very well here to review her laboratories in detail. Diet and Physical Activity  Diet/Nutrition: well balanced diet. 6 times per week  , going to the community [place] , walking chucho  Exercise: walking.       Depression Screening  PHQ-2/9 Depression Screening    Little interest or pleasure in doing things: 0 - not at all  Feeling down, depressed, or hopeless: 0 - not at all  PHQ-2 Score: 0  PHQ-2 Interpretation: Negative depression screen       General Health  Sleep: sleeps well. Hearing: normal - bilateral.  Vision: no vision problems. Dental: regular dental visits. /GYN Health  Patient is: postmenopausal  Last menstrual period: 53  Contraceptive method:      Review of Systems:     Review of Systems   Constitutional: Negative for activity change, appetite change and unexpected weight change. HENT: Negative for congestion. Eyes: Negative for visual disturbance. Respiratory: Negative for cough and shortness of breath. Cardiovascular: Negative for chest pain. Gastrointestinal: Negative for abdominal pain, diarrhea, nausea and vomiting. Neurological: Negative for dizziness, light-headedness and headaches. Past Medical History:     Past Medical History:   Diagnosis Date   • Disease of thyroid gland       Past Surgical History:     Past Surgical History:   Procedure Laterality Date   • BIOPSY CORE NEEDLE Right 01/10/2017    BIOPSY BREAST PERCUTANTOUS NEEDLE CORE   • BREAST BIOPSY Right 01/10/2017     biopsy benign   • BREAST LUMPECTOMY Left 2021    Procedure: BREAST MASS EXCISION;  Surgeon: Wilbur Michael MD;  Location: [facility];  Service: Surgical Oncology   •  SECTION      DESC: 3/2000,10/2001,10/2005,2007   •  SECTION     • LIPOMA RESECTION Left 2021   • CO COLONOSCOPY FLX DX W/COLLJ SPEC WHEN PFRMD N/A 2018    Procedure: COLONOSCOPY;  Surgeon: Aayush Mccabe MD;  Location: [facility];   Service: Gastroenterology   • TUBAL LIGATION     • US GUIDED BREAST BIOPSY RIGHT COMPLETE Right 1/10/2017   • WISDOM TOOTH EXTRACTION        Social History:     Social History     Socioeconomic History   • Marital status: /Civil Union     Spouse name: None   • Number of children: None   • Years of education: 2ND YEAR COLLEGE   • Highest education level: None   Occupational History   • Occupation: HOMEMAKER   Tobacco Use   • Smoking status: Never   • Smokeless tobacco: Never   Vaping Use   • Vaping Use: Never used   Substance and Sexual Activity   • Alcohol use: Yes     Alcohol/week: 1.0 standard drink of alcohol     Types: 1 Glasses of wine per week   • Drug use: No   • Sexual activity: Yes     Comment: USES BIRTH CONTROL   Other Topics Concern   • None   Social History Narrative    DAILY COFFEE CONSUMPTION 2 CUPS A DAY    DENIED---- HOME ENVIRONMENT DOMESTIC VIOLENCE    LIVING INDEPENDENTLY WITH SPOUSE     Social Determinants of Health     Financial Resource Strain: Not on file   Food Insecurity: Not on file   Transportation Needs: Not on file   Physical Activity: Not on file   Stress: Not on file   Social Connections: Not on file   Intimate Partner Violence: Not on file   Housing Stability: Not on file      Family History:     Family History   Problem Relation Age of Onset   • Coronary artery disease Mother    • Heart attack Mother    • No Known Problems Father    • No Known Problems Daughter    • No Known Problems Maternal Grandmother    • No Known Problems Maternal Grandfather    • No Known Problems Paternal Grandmother    • No Known Problems Paternal Grandfather    • Colon cancer Maternal Aunt         ? 30's   • No Known Problems Maternal Aunt    • Prostate cancer Maternal Uncle         70's   • No Known Problems Paternal Aunt    • No Known Problems Son    • No Known Problems Son    • No Known Problems Son       Current Medications:     Current Outpatient Medications   Medication Sig Dispense Refill   • Diclofenac Sodium (VOLTAREN) 1 % Apply 2 g topically 4 (four) times a day as needed (lateral epicodilitis) 1 g 2   • levothyroxine 50 mcg tablet TAKE 1 TABLET DAILY 90 tablet 3     No current facility-administered medications for this visit.       Allergies:     No Known Allergies   Physical Exam:     /78   Pulse 77   Ht 5' 2" (1.575 m)   Wt 67.3 kg (148 lb 6.4 oz)   LMP 09/04/2020 (Exact Date)   SpO2 99%   BMI 27.14 kg/m²     Physical Exam  Vitals and nursing note reviewed. Constitutional:       General: She is not in acute distress. Appearance: Normal appearance. She is well-developed. She is not ill-appearing, toxic-appearing or diaphoretic. HENT:      Head: Normocephalic and atraumatic. Right Ear: External ear normal.      Left Ear: External ear normal.      Nose: Nose normal.   Eyes:      Pupils: Pupils are equal, round, and reactive to light. Cardiovascular:      Rate and Rhythm: Normal rate and regular rhythm. Heart sounds: Normal heart sounds. No murmur heard. Pulmonary:      Effort: Pulmonary effort is normal.      Breath sounds: Normal breath sounds. Abdominal:      General: There is no distension. Palpations: Abdomen is soft. Tenderness: There is no abdominal tenderness. There is no guarding.      Enlargement of the left thyroid possibly, lateral epicondyles tenderness right elbow with palpation    DO Daniel Lopez

## 2023-08-10 ENCOUNTER — TELEPHONE (OUTPATIENT)
Dept: INTERNAL MEDICINE CLINIC | Facility: CLINIC | Age: 53
End: 2023-08-10

## 2023-08-10 ENCOUNTER — LAB (OUTPATIENT)
Dept: LAB | Facility: CLINIC | Age: 53
End: 2023-08-10
Payer: COMMERCIAL

## 2023-08-10 DIAGNOSIS — E03.9 HYPOTHYROIDISM, UNSPECIFIED TYPE: ICD-10-CM

## 2023-08-10 DIAGNOSIS — Z13.6 SCREENING FOR CARDIOVASCULAR CONDITION: ICD-10-CM

## 2023-08-10 DIAGNOSIS — E04.9 ENLARGED THYROID: ICD-10-CM

## 2023-08-10 PROBLEM — M77.11 LATERAL EPICONDYLITIS OF RIGHT ELBOW: Status: ACTIVE | Noted: 2023-08-10

## 2023-08-10 LAB
ALBUMIN SERPL BCP-MCNC: 4.3 G/DL (ref 3.5–5)
ALP SERPL-CCNC: 60 U/L (ref 34–104)
ALT SERPL W P-5'-P-CCNC: 12 U/L (ref 7–52)
ANION GAP SERPL CALCULATED.3IONS-SCNC: 6 MMOL/L
AST SERPL W P-5'-P-CCNC: 17 U/L (ref 13–39)
BILIRUB SERPL-MCNC: 0.67 MG/DL (ref 0.2–1)
BUN SERPL-MCNC: 16 MG/DL (ref 5–25)
CALCIUM SERPL-MCNC: 9.4 MG/DL (ref 8.4–10.2)
CHLORIDE SERPL-SCNC: 102 MMOL/L (ref 96–108)
CHOLEST SERPL-MCNC: 184 MG/DL
CO2 SERPL-SCNC: 29 MMOL/L (ref 21–32)
CREAT SERPL-MCNC: 0.81 MG/DL (ref 0.6–1.3)
GFR SERPL CREATININE-BSD FRML MDRD: 83 ML/MIN/1.73SQ M
GLUCOSE P FAST SERPL-MCNC: 89 MG/DL (ref 65–99)
HDLC SERPL-MCNC: 63 MG/DL
LDLC SERPL CALC-MCNC: 111 MG/DL (ref 0–100)
POTASSIUM SERPL-SCNC: 4.1 MMOL/L (ref 3.5–5.3)
PROT SERPL-MCNC: 6.8 G/DL (ref 6.4–8.4)
SODIUM SERPL-SCNC: 137 MMOL/L (ref 135–147)
TRIGL SERPL-MCNC: 50 MG/DL
TSH SERPL DL<=0.05 MIU/L-ACNC: 3.25 UIU/ML (ref 0.45–4.5)

## 2023-08-10 PROCEDURE — 84443 ASSAY THYROID STIM HORMONE: CPT

## 2023-08-10 PROCEDURE — 36415 COLL VENOUS BLD VENIPUNCTURE: CPT

## 2023-08-10 PROCEDURE — 80053 COMPREHEN METABOLIC PANEL: CPT

## 2023-08-10 PROCEDURE — 80061 LIPID PANEL: CPT

## 2023-08-10 NOTE — ASSESSMENT & PLAN NOTE
Assessment and plan 1. Health maintenance annual wellness examination overall the patient is clinically stable and doing well, we encouraged the patient to follow a healthy and balanced diet. We recommend that the patient exercise routinely approximately 30 minutes 5 times per week . We have reviewed the patient's vaccines and have made recommendations for updates if necessary   annual flu shot in the fall   . We will be ordering screening laboratories which are age appropriate. Return to the office in 1 year    call if any problems.

## 2023-08-10 NOTE — ASSESSMENT & PLAN NOTE
Hypothyroidism controlled the patient is currently euthyroid I will be ordering a TSH prior to the next office visit and the patient will continue with current medical regiment; we will continue to monitor the patient's progress.   Continue levothyroxine 50 mcg once daily

## 2023-08-10 NOTE — TELEPHONE ENCOUNTER
[facility] called regarding the Diclofenac Sodium Gel 1%. The insurance is requiring a prior authorization. Please see the comments below from the insurance and advise if patient has tried any of the mentioned medications. Has the patient tried two different prescription-strength step 1 non-steroidal anti-inflammatory drugs (NSAIDs) for the current condition? Step 1 NSAIDs include: Cataflam, diclofenac potassium 50 mg, diclofenac potassium 25 mg capsules, diclofenac sodium(IR and ER), diclofenac sodium and misoprostol, diclofenac sodium topical solution 1.5%, etodolac (IR and ER), flurbiprofen, ibuprofen, indomethacin (IR and ER), ketoprofen IR 50 mg and 75mg, ketorolac (tablets), meclofenamate, mefenamic acid, meloxicam tablets, nabumetone, naproxen (generics for Naprelan and Naproxen Suspension are NOT step 1), oxaprozin, piroxicam, sulindac, and tolmetin 200 mg. PLEASE NOTE: Celecoxib is accepted as a step 1 NSAID. Over-the-counter (OTC) NSAIDs count as alternatives when the patient used prescription-strength doses. OTC dose of ibuprofen (Advil) = 200 mg; therefore a prescription-strength dose would be a dose greater than 200 mg per dose.  OTC dose of naproxen sodium (Aleve) = 220 mg; therefore a prescription-strength dose would be a dose greater than 220 mg per dose

## 2023-10-20 ENCOUNTER — CLINICAL SUPPORT (OUTPATIENT)
Dept: INTERNAL MEDICINE CLINIC | Facility: CLINIC | Age: 53
End: 2023-10-20

## 2023-10-20 DIAGNOSIS — Z23 ENCOUNTER FOR IMMUNIZATION: Primary | ICD-10-CM

## 2024-01-02 ENCOUNTER — ANNUAL EXAM (OUTPATIENT)
Dept: OBGYN CLINIC | Facility: CLINIC | Age: 54
End: 2024-01-02
Payer: COMMERCIAL

## 2024-01-02 VITALS
WEIGHT: 154.4 LBS | DIASTOLIC BLOOD PRESSURE: 80 MMHG | HEIGHT: 61 IN | BODY MASS INDEX: 29.15 KG/M2 | SYSTOLIC BLOOD PRESSURE: 128 MMHG

## 2024-01-02 DIAGNOSIS — Z01.419 WOMEN'S ANNUAL ROUTINE GYNECOLOGICAL EXAMINATION: Primary | ICD-10-CM

## 2024-01-02 DIAGNOSIS — Z01.419 PAP SMEAR, AS PART OF ROUTINE GYNECOLOGICAL EXAMINATION: ICD-10-CM

## 2024-01-02 DIAGNOSIS — N95.1 VASOMOTOR SYMPTOMS DUE TO MENOPAUSE: ICD-10-CM

## 2024-01-02 DIAGNOSIS — N95.2 ATROPHIC VAGINITIS: ICD-10-CM

## 2024-01-02 DIAGNOSIS — Z12.31 ENCOUNTER FOR SCREENING MAMMOGRAM FOR MALIGNANT NEOPLASM OF BREAST: ICD-10-CM

## 2024-01-02 PROCEDURE — 99396 PREV VISIT EST AGE 40-64: CPT | Performed by: OBSTETRICS & GYNECOLOGY

## 2024-01-02 NOTE — PROGRESS NOTES
Assessment/Plan:    No problem-specific Assessment & Plan notes found for this encounter.       Diagnoses and all orders for this visit:    Women's annual routine gynecological examination  -     Thinprep Tis Pap Reflex HPV mRNA E6/E7  -     Cancel: Liquid-based pap, screening    Pap smear, as part of routine gynecological examination    Encounter for screening mammogram for malignant neoplasm of breast    Atrophic vaginitis    Vasomotor symptoms due to menopause          Normal gynecological physical examination.  Self-breast examination stressed.  Mammogram ordered.  Discussed regular exercise, healthy diet, importance of vitamin D and calcium supplements.  Discussed importance of sun block use during periods of prolonged sun exposure.  Patient will be seen in 1 year for routine gynecologic and medical examination.  Patient will call office for any problems, concerns, or issues which may arise during the interim.     Subjective:          HPI    Patient ID: Elsy Garcia is a 53 y.o. female who presents today for her annual gynecologic and medical examination    Menstrual status: Patient is postmenopausal and denies any vaginal bleeding at this time.  Patient is experiencing moderate to severe hot flashes and I suggested a trip to [facility] for consultation regarding natural supplements.  Will see patient back in 6 months for reassessment to determine whether those results are satisfactory or whether she will need estrogen replacement therapy.  I also told her to call earlier should she not achieve satisfactory success with the natural remedies.    Vasomotor symptoms: Patient reports moderate to severe hot flashes at this time    Patient reports normal appetite    Patient reports normal bowel and bladder habits    Patient denies any significant pelvic or abdominal pain    Patient denies any headaches, chest pain, shortness of breath fever shakes or chills    Patient denies any COVID 19 symptoms including  "cough or loss of taste or smell    COVID vaccine status: Patient aware of COVID vaccination protocols    Medical problems: Patient followed for hypothyroidism    Colonoscopy status: Patient up-to-date with screening colonoscopy    Mammogram status: Importance of self breast exam stressed to the patient at today's visit and she is up-to-date with screening mammography.  Appropriate arrangements for her annual screening mammogram were placed into the EMR system at today's visit.    The following portions of the patient's history were reviewed and updated as appropriate: allergies, current medications, past family history, past medical history, past social history, past surgical history and problem list.    Review of Systems   Constitutional: Negative.  Negative for appetite change, diaphoresis, fatigue, fever and unexpected weight change.   HENT: Negative.     Eyes: Negative.    Respiratory: Negative.     Cardiovascular: Negative.    Gastrointestinal: Negative.  Negative for abdominal pain, blood in stool, constipation, diarrhea, nausea and vomiting.   Endocrine: Negative.  Negative for cold intolerance and heat intolerance.        Followed for hypothyroidism   Genitourinary: Negative.  Negative for dysuria, frequency, hematuria, urgency, vaginal bleeding, vaginal discharge and vaginal pain.   Musculoskeletal: Negative.    Skin: Negative.    Allergic/Immunologic: Negative.    Neurological: Negative.    Hematological: Negative.  Negative for adenopathy.   Psychiatric/Behavioral: Negative.           Objective:      /80   Ht 5' 1\" (1.549 m)   Wt 70 kg (154 lb 6.4 oz)   LMP 09/04/2020 (Exact Date)   BMI 29.17 kg/m²          Physical Exam  Constitutional:       General: She is not in acute distress.     Appearance: Normal appearance. She is well-developed. She is not diaphoretic.   HENT:      Head: Normocephalic and atraumatic.   Eyes:      Pupils: Pupils are equal, round, and reactive to light.   Cardiovascular: "      Rate and Rhythm: Normal rate and regular rhythm.      Heart sounds: Normal heart sounds. No murmur heard.     No friction rub. No gallop.   Pulmonary:      Effort: Pulmonary effort is normal.      Breath sounds: Normal breath sounds.   Chest:   Breasts:     Breasts are symmetrical.      Right: No inverted nipple, mass, nipple discharge, skin change or tenderness.      Left: No inverted nipple, mass, nipple discharge, skin change or tenderness.   Abdominal:      General: Bowel sounds are normal.      Palpations: Abdomen is soft.   Genitourinary:     General: Normal vulva.      Exam position: Supine.      Labia:         Right: No rash or lesion.         Left: No rash or lesion.       Urethra: No urethral swelling or urethral lesion.      Vagina: Normal. No vaginal discharge, erythema, tenderness or bleeding.      Cervix: No discharge or friability.      Uterus: Not enlarged and not tender.       Adnexa:         Right: No mass, tenderness or fullness.          Left: No mass, tenderness or fullness.        Rectum: Normal. Guaiac result negative.      Comments: Pelvic exam revealed mild atrophic vaginitis  Good pelvic support confirmed  Musculoskeletal:         General: Normal range of motion.      Cervical back: Normal range of motion and neck supple.   Lymphadenopathy:      Cervical: No cervical adenopathy.      Upper Body:      Right upper body: No supraclavicular adenopathy.      Left upper body: No supraclavicular adenopathy.   Skin:     General: Skin is warm and dry.      Findings: No rash.   Neurological:      General: No focal deficit present.      Mental Status: She is alert and oriented to person, place, and time.   Psychiatric:         Mood and Affect: Mood normal.         Speech: Speech normal.         Behavior: Behavior normal.         Thought Content: Thought content normal.         Judgment: Judgment normal.

## 2024-01-02 NOTE — PATIENT INSTRUCTIONS
Normal gynecological physical examination.  Self-breast examination stressed.  Mammogram ordered.  Discussed regular exercise, healthy diet, importance of vitamin D and calcium supplements.  Discussed importance of sun block use during periods of prolonged sun exposure.  Patient will be seen in 1 year for routine gynecologic and medical examination.  Patient will call office for any problems, concerns, or issues which may arise during the interim.    Patient to visit [facility] for consultation regarding natural supplements for vasomotor symptoms namely moderate to severe hot flashes.  Will see patient back in 6 months for an interval follow-up surveillance visit.  If however patient does not achieve satisfactory relief with the natural supplements I advised her to call for an appointment earlier to initiate estrogen replacement therapy at that time.

## 2024-01-12 ENCOUNTER — CLINICAL SUPPORT (OUTPATIENT)
Dept: INTERNAL MEDICINE CLINIC | Facility: CLINIC | Age: 54
End: 2024-01-12
Payer: COMMERCIAL

## 2024-01-12 DIAGNOSIS — Z23 ENCOUNTER FOR IMMUNIZATION: Primary | ICD-10-CM

## 2024-01-12 PROCEDURE — 90750 HZV VACC RECOMBINANT IM: CPT

## 2024-01-12 PROCEDURE — 90471 IMMUNIZATION ADMIN: CPT

## 2024-02-02 DIAGNOSIS — E03.9 HYPOTHYROIDISM, UNSPECIFIED TYPE: ICD-10-CM

## 2024-02-02 RX ORDER — LEVOTHYROXINE SODIUM 0.05 MG/1
TABLET ORAL
Qty: 90 TABLET | Refills: 1 | Status: SHIPPED | OUTPATIENT
Start: 2024-02-02

## 2024-02-21 PROBLEM — Z13.6 SCREENING FOR CARDIOVASCULAR CONDITION: Status: RESOLVED | Noted: 2019-07-08 | Resolved: 2024-02-21

## 2024-04-09 ENCOUNTER — HOSPITAL ENCOUNTER (OUTPATIENT)
Dept: MAMMOGRAPHY | Facility: HOSPITAL | Age: 54
Discharge: HOME | End: 2024-04-09
Attending: OBSTETRICS & GYNECOLOGY
Payer: COMMERCIAL

## 2024-04-09 VITALS — HEIGHT: 61 IN | BODY MASS INDEX: 29.07 KG/M2 | WEIGHT: 154 LBS

## 2024-04-09 DIAGNOSIS — Z12.31 ENCOUNTER FOR SCREENING MAMMOGRAM FOR MALIGNANT NEOPLASM OF BREAST: ICD-10-CM

## 2024-04-09 PROCEDURE — 77067 SCR MAMMO BI INCL CAD: CPT

## 2024-04-09 PROCEDURE — 77063 BREAST TOMOSYNTHESIS BI: CPT

## 2024-07-12 DIAGNOSIS — Z00.6 ENCOUNTER FOR EXAMINATION FOR NORMAL COMPARISON OR CONTROL IN CLINICAL RESEARCH PROGRAM: ICD-10-CM

## 2024-07-15 ENCOUNTER — APPOINTMENT (OUTPATIENT)
Dept: LAB | Facility: CLINIC | Age: 54
End: 2024-07-15

## 2024-07-15 DIAGNOSIS — Z00.6 ENCOUNTER FOR EXAMINATION FOR NORMAL COMPARISON OR CONTROL IN CLINICAL RESEARCH PROGRAM: ICD-10-CM

## 2024-07-15 PROCEDURE — 36415 COLL VENOUS BLD VENIPUNCTURE: CPT

## 2024-07-23 ENCOUNTER — OFFICE VISIT (OUTPATIENT)
Dept: OBGYN CLINIC | Facility: CLINIC | Age: 54
End: 2024-07-23
Payer: COMMERCIAL

## 2024-07-23 VITALS
HEIGHT: 61 IN | DIASTOLIC BLOOD PRESSURE: 76 MMHG | SYSTOLIC BLOOD PRESSURE: 120 MMHG | BODY MASS INDEX: 26.96 KG/M2 | WEIGHT: 142.8 LBS

## 2024-07-23 DIAGNOSIS — N95.1 VASOMOTOR SYMPTOMS DUE TO MENOPAUSE: Primary | ICD-10-CM

## 2024-07-23 PROCEDURE — 99213 OFFICE O/P EST LOW 20 MIN: CPT | Performed by: OBSTETRICS & GYNECOLOGY

## 2024-07-23 NOTE — PROGRESS NOTES
Assessment/Plan:      Diagnoses and all orders for this visit:    Vasomotor symptoms due to menopause      Discussed in detail the patient's vasomotor symptoms. At this time she is not interested in hormonal treatments. She notes she will think about starting the therapy. Will reassess in 6 mo at her annual exam. We discussed natural supplementations, estrogen therapy, and SSRIs. All questions were answered during the visit. If the patient were to have any further questions or concerns she should call the office.     Total time of today's visit was 20 minutes of which greater than 50% was spent face-to-face counseling the patient as well as coordination of care, review of chart and lab values, physical examination as well as computer entry into the [employer] medical record system.      Subjective:     Patient ID: Elsy Garcia is a 54 y.o. female for a follow up for moderate to severe vasomotor symptoms. She was seen 6 months ago for which she noted hot flashes for which I suggested a trip to [place]. The patient notes she has begun multiple supplements and natural remedies with little relief. She notes she is hesitant to begin any hormonal therapy at this time. She is interested in learning more about her options. The patient does note that she will get anxious at times which makes her symptoms worse. Denies any personal or family history of breast cancer or VTE.     Review of Systems   Constitutional:  Positive for diaphoresis. Negative for appetite change, fatigue, fever and unexpected weight change.   HENT: Negative.     Eyes: Negative.    Respiratory: Negative.     Cardiovascular: Negative.    Gastrointestinal: Negative.  Negative for abdominal pain, blood in stool, constipation, diarrhea, nausea and vomiting.   Endocrine: Negative.  Negative for cold intolerance and heat intolerance.        Followed for thyroid   Genitourinary: Negative.  Negative for dysuria, frequency, hematuria, urgency, vaginal  bleeding, vaginal discharge and vaginal pain.   Musculoskeletal: Negative.    Skin: Negative.    Allergic/Immunologic: Negative.    Neurological: Negative.    Hematological: Negative.  Negative for adenopathy.   Psychiatric/Behavioral:  The patient is nervous/anxious.          Objective:     Physical Exam  Constitutional:       General: She is not in acute distress.     Appearance: Normal appearance. She is not ill-appearing.   HENT:      Head: Normocephalic.   Eyes:      Pupils: Pupils are equal, round, and reactive to light.   Pulmonary:      Effort: Pulmonary effort is normal. No respiratory distress.   Musculoskeletal:         General: Normal range of motion.      Cervical back: Normal range of motion.   Skin:     General: Skin is warm and dry.   Neurological:      General: No focal deficit present.      Mental Status: She is alert. Mental status is at baseline.

## 2024-07-23 NOTE — PATIENT INSTRUCTIONS
Discussed in detail the patient's vasomotor symptoms. At this time she is not interested in hormonal treatments. She notes she will think about starting the therapy. Will reassess in 6 mo at her annual exam. We discussed natural supplementations, estrogen therapy, and SSRIs. All questions were answered during the visit. If the patient were to have any further questions or concerns she should call the office.

## 2024-07-30 LAB
APOB+LDLR+PCSK9 GENE MUT ANL BLD/T: NOT DETECTED
BRCA1+BRCA2 DEL+DUP + FULL MUT ANL BLD/T: NOT DETECTED
MLH1+MSH2+MSH6+PMS2 GN DEL+DUP+FUL M: NOT DETECTED

## 2024-07-31 DIAGNOSIS — E03.9 HYPOTHYROIDISM, UNSPECIFIED TYPE: ICD-10-CM

## 2024-07-31 RX ORDER — LEVOTHYROXINE SODIUM 0.05 MG/1
TABLET ORAL
Qty: 90 TABLET | Refills: 0 | Status: SHIPPED | OUTPATIENT
Start: 2024-07-31

## 2024-08-06 ENCOUNTER — RA CDI HCC (OUTPATIENT)
Dept: OTHER | Facility: HOSPITAL | Age: 54
End: 2024-08-06

## 2024-08-12 ENCOUNTER — OFFICE VISIT (OUTPATIENT)
Dept: INTERNAL MEDICINE CLINIC | Facility: CLINIC | Age: 54
End: 2024-08-12
Payer: COMMERCIAL

## 2024-08-12 VITALS
HEIGHT: 61 IN | DIASTOLIC BLOOD PRESSURE: 78 MMHG | HEART RATE: 58 BPM | OXYGEN SATURATION: 99 % | BODY MASS INDEX: 26.43 KG/M2 | SYSTOLIC BLOOD PRESSURE: 118 MMHG | WEIGHT: 140 LBS

## 2024-08-12 DIAGNOSIS — Z23 ENCOUNTER FOR IMMUNIZATION: ICD-10-CM

## 2024-08-12 DIAGNOSIS — E03.9 HYPOTHYROIDISM, UNSPECIFIED TYPE: ICD-10-CM

## 2024-08-12 DIAGNOSIS — Z00.00 WELLNESS EXAMINATION: Primary | ICD-10-CM

## 2024-08-12 DIAGNOSIS — Z13.6 SCREENING FOR CARDIOVASCULAR CONDITION: ICD-10-CM

## 2024-08-12 DIAGNOSIS — E78.2 MIXED HYPERLIPIDEMIA: ICD-10-CM

## 2024-08-12 DIAGNOSIS — Z13.1 SCREENING FOR DIABETES MELLITUS: ICD-10-CM

## 2024-08-12 DIAGNOSIS — M54.2 CHRONIC NECK PAIN: ICD-10-CM

## 2024-08-12 DIAGNOSIS — R09.81 NASAL CONGESTION: ICD-10-CM

## 2024-08-12 DIAGNOSIS — F41.9 ANXIETY: ICD-10-CM

## 2024-08-12 DIAGNOSIS — J34.89 NASAL OBSTRUCTION: ICD-10-CM

## 2024-08-12 DIAGNOSIS — G89.29 CHRONIC NECK PAIN: ICD-10-CM

## 2024-08-12 DIAGNOSIS — Z78.0 POSTMENOPAUSAL: ICD-10-CM

## 2024-08-12 PROCEDURE — 90471 IMMUNIZATION ADMIN: CPT

## 2024-08-12 PROCEDURE — 90714 TD VACC NO PRESV 7 YRS+ IM: CPT

## 2024-08-12 PROCEDURE — 99214 OFFICE O/P EST MOD 30 MIN: CPT | Performed by: INTERNAL MEDICINE

## 2024-08-12 PROCEDURE — 99396 PREV VISIT EST AGE 40-64: CPT | Performed by: INTERNAL MEDICINE

## 2024-08-12 RX ORDER — FLUTICASONE PROPIONATE 50 MCG
2 SPRAY, SUSPENSION (ML) NASAL
Qty: 1 G | Refills: 0 | Status: SHIPPED | OUTPATIENT
Start: 2024-08-12

## 2024-08-12 RX ORDER — VENLAFAXINE HYDROCHLORIDE 37.5 MG/1
37.5 CAPSULE, EXTENDED RELEASE ORAL
Qty: 30 CAPSULE | Refills: 5 | Status: SHIPPED | OUTPATIENT
Start: 2024-08-12

## 2024-08-12 NOTE — ASSESSMENT & PLAN NOTE
Mild hyperlipidemia reviewed previous years laboratories showing a mild elevated cholesterol level ASCVD risk score 1.1% recommend a healthy and balanced diet will check fasting lipid panel

## 2024-08-12 NOTE — ASSESSMENT & PLAN NOTE
Patient has noticed difficulty with aeration through her nose especially while she is walking on examination she has a mild deviation of the septum to the right along with congestion and mild turbinate hypertrophy likely allergies will have patient see ENT for nasopharyngeal scope recommend Flonase 2 sprays each nostril once daily as needed at nighttime

## 2024-08-12 NOTE — ASSESSMENT & PLAN NOTE
Severe hot flashes/postmenopausal symptoms she has tried multiple modalities without improvements patient would prefer to hold off on low-dose estrogen at this point she has been to her OB/GYN her symptoms are severe they do impact her sleep/quality of life and lead to anxiety Rx for Effexor XR 37.5 mg once daily reviewed the risk benefits and side effects RTO in 2 months call if any problems also recommend for patient to see menopause specialist Dr. Lindsey Cabrales

## 2024-08-12 NOTE — ASSESSMENT & PLAN NOTE
Assessment and plan 1.  Health maintenance annual wellness examination overall the patient is clinically stable and doing well, we encouraged the patient to follow a healthy and balanced diet.  We recommend that the patient exercise routinely approximately 30 minutes 5 times per week .  We have reviewed the patient's vaccines and have made recommendations for updates if necessary recommend flu vaccine in the fall patient received her updated Td she is up-to-date on Shingrix, colonoscopy is up-to-date mammography up-to-date    .  We will be ordering screening laboratories which are age appropriate.  Return to the office in 2 months    call if any problems.

## 2024-08-12 NOTE — PROGRESS NOTES
Adult Annual Physical  Name: Elsy Garcia      : 1970      MRN: [id number]  Encounter Provider: Timothy Fierro DO  Encounter Date: 2024   Encounter department: [facility]    Assessment & Plan   1. Wellness examination  Assessment & Plan:  Assessment and plan 1.  Health maintenance annual wellness examination overall the patient is clinically stable and doing well, we encouraged the patient to follow a healthy and balanced diet.  We recommend that the patient exercise routinely approximately 30 minutes 5 times per week .  We have reviewed the patient's vaccines and have made recommendations for updates if necessary recommend flu vaccine in the fall patient received her updated Td she is up-to-date on Shingrix, colonoscopy is up-to-date mammography up-to-date    .  We will be ordering screening laboratories which are age appropriate.  Return to the office in 2 months    call if any problems.  2. Hypothyroidism, unspecified type  Assessment & Plan:  Hypothyroidism controlled the patient is currently euthyroid I will be ordering a TSH prior to the next office visit and the patient will continue with current medical regiment; we will continue to monitor the patient's progress.  Continue levothyroxine 50 mcg once daily  Orders:  -     TSH, 3rd generation; Future  3. Encounter for immunization  -     TD VACCINE GREATER THAN OR EQUAL TO 6YO PRESERVATIVE FREE IM  4. Postmenopausal  Assessment & Plan:  Severe hot flashes/postmenopausal symptoms she has tried multiple modalities without improvements patient would prefer to hold off on low-dose estrogen at this point she has been to her OB/GYN her symptoms are severe they do impact her sleep/quality of life and lead to anxiety Rx for Effexor XR 37.5 mg once daily reviewed the risk benefits and side effects RTO in 2 months call if any problems also recommend for patient to see menopause specialist Dr. Lindsey Cabrales  Orders:  -      venlafaxine (EFFEXOR-XR) 37.5 mg 24 hr capsule; Take 1 capsule (37.5 mg total) by mouth daily with breakfast  -     Ambulatory Referral to Obstetrics / Gynecology; Future  5. Anxiety  -     venlafaxine (EFFEXOR-XR) 37.5 mg 24 hr capsule; Take 1 capsule (37.5 mg total) by mouth daily with breakfast  6. Chronic neck pain  -     Ambulatory Referral to Chiropractic; Future  7. Screening for cardiovascular condition  -     Comprehensive metabolic panel; Future  -     Lipid Panel with Direct LDL reflex; Future  8. Screening for diabetes mellitus  -     Hemoglobin A1C; Future  9. Mixed hyperlipidemia  Assessment & Plan:  Mild hyperlipidemia reviewed previous years laboratories showing a mild elevated cholesterol level ASCVD risk score 1.1% recommend a healthy and balanced diet will check fasting lipid panel  10. Nasal obstruction  Assessment & Plan:  Patient has noticed difficulty with aeration through her nose especially while she is walking on examination she has a mild deviation of the septum to the right along with congestion and mild turbinate hypertrophy likely allergies will have patient see ENT for nasopharyngeal scope recommend Flonase 2 sprays each nostril once daily as needed at nighttime  Orders:  -     Ambulatory Referral to Otolaryngology; Future  11. Nasal congestion  -     fluticasone (FLONASE) 50 mcg/act nasal spray; 2 sprays into each nostril daily at bedtime as needed for rhinitis  Immunizations and preventive care screenings were discussed with patient today. Appropriate education was printed on patient's after visit summary.    Counseling:  Exercise: the importance of regular exercise/physical activity was discussed. Recommend exercise 3-5 times per week for at least 30 minutes.       Depression Screening and Follow-up Plan: Patient was screened for depression during today's encounter. They screened negative with a PHQ-2 score of 0.        History of Present Illness 54-year old female coming in for a  follow up office visit regarding hypothyroidism, postmenopausal state/hot flashes, anxiety, hyperlipidemia, chronic neck pain; the patient reports me compliant taking medications without untoward side effects the.  The patient is here to review his medical condition, update me on the medical condition and the patient reports me no hospitalizations and no ER visits.  No injuries no illnesses patient does report to me difficulty with aeration through her nostrils with walking primarily but can occur in the home over the last year or so.  She does not report to me allergies she does not report to me aspirin sensitivity that triggers the symptoms.  She does not have recurrent sinus infections.  Also the patient has been experiencing for the last several years postmenopausal significant hot flashes she has been to GYN and tried multiple over-the-counter medicines GYN is recommending low-dose estrogen patient would like to hold off at this point.  Flashes affect her sleep, quality of life and can lead to anxiety regarding the hot flashes; patient is following healthy and balanced diet she is walking routinely she has made progress on her weight.  Reports nose clogged , constantly  stuffed past 1 yr walking up the stairs     Adult Annual Physical:  Patient presents for annual physical.     Diet and Physical Activity:  - Diet/Nutrition: well balanced diet and consuming 3-5 servings of fruits/vegetables daily.  - Exercise: walking, 5-7 times a week on average and 1-2 hours on average.    Depression Screening:  - PHQ-2 Score: 0    General Health:  - Sleep: sleeps poorly. 10pm wak 6:30am  - Hearing: normal hearing right ear and normal hearing left ear.  - Vision: no vision problems.  - Dental: regular dental visits.    /GYN Health:  - Follows with GYN: yes.   - Menopause: postmenopausal.   - History of STDs: no    Advanced Care Planning:  - Has an advanced directive?: no    - Has a durable medical POA?: no    Hot flushes  "terrible  4yrs- mg , black cohosh, broccoli supplement   Review of Systems   Constitutional:  Negative for activity change, appetite change and unexpected weight change.   HENT:  Positive for congestion. Negative for postnasal drip.         Nasal obstruction   Eyes:  Negative for visual disturbance.   Respiratory:  Negative for cough and shortness of breath.    Cardiovascular:  Negative for chest pain.   Gastrointestinal:  Negative for abdominal pain, diarrhea, nausea and vomiting.   Endocrine:        Hot flashes/postmenopausal state   Neurological:  Negative for dizziness, light-headedness and headaches.   Hematological:  Negative for adenopathy.         Objective     /78   Pulse 58   Ht 5' 1\" (1.549 m)   Wt 63.5 kg (140 lb)   LMP 09/04/2020 (Exact Date)   SpO2 99%   BMI 26.45 kg/m²   Mild deviation of the septum to the right nasal also turbinate hypertrophy mild clear nasal congestion  Physical Exam  Vitals and nursing note reviewed.   Constitutional:       General: She is not in acute distress.     Appearance: Normal appearance. She is well-developed. She is not ill-appearing, toxic-appearing or diaphoretic.   HENT:      Head: Normocephalic and atraumatic.      Right Ear: External ear normal.      Left Ear: External ear normal.      Nose: Nose normal.      Mouth/Throat:      Mouth: Oropharynx is clear and moist.   Eyes:      Pupils: Pupils are equal, round, and reactive to light.   Cardiovascular:      Rate and Rhythm: Normal rate and regular rhythm.      Heart sounds: Normal heart sounds. No murmur heard.  Pulmonary:      Effort: Pulmonary effort is normal.      Breath sounds: Normal breath sounds.   Abdominal:      General: There is no distension.      Palpations: Abdomen is soft.      Tenderness: There is no abdominal tenderness. There is no guarding.   Musculoskeletal:         General: No edema.   Neurological:      Mental Status: She is alert.   Psychiatric:         Mood and Affect: Mood and " affect normal. Mood is not anxious.

## 2024-08-13 ENCOUNTER — APPOINTMENT (OUTPATIENT)
Dept: LAB | Facility: CLINIC | Age: 54
End: 2024-08-13
Payer: COMMERCIAL

## 2024-08-13 DIAGNOSIS — Z13.6 SCREENING FOR CARDIOVASCULAR CONDITION: ICD-10-CM

## 2024-08-13 DIAGNOSIS — Z13.1 SCREENING FOR DIABETES MELLITUS: ICD-10-CM

## 2024-08-13 DIAGNOSIS — E03.9 HYPOTHYROIDISM, UNSPECIFIED TYPE: ICD-10-CM

## 2024-08-13 LAB
ALBUMIN SERPL BCG-MCNC: 4.3 G/DL (ref 3.5–5)
ALP SERPL-CCNC: 54 U/L (ref 34–104)
ALT SERPL W P-5'-P-CCNC: 14 U/L (ref 7–52)
ANION GAP SERPL CALCULATED.3IONS-SCNC: 4 MMOL/L (ref 4–13)
AST SERPL W P-5'-P-CCNC: 17 U/L (ref 13–39)
BILIRUB SERPL-MCNC: 0.6 MG/DL (ref 0.2–1)
BUN SERPL-MCNC: 16 MG/DL (ref 5–25)
CALCIUM SERPL-MCNC: 9.2 MG/DL (ref 8.4–10.2)
CHLORIDE SERPL-SCNC: 103 MMOL/L (ref 96–108)
CHOLEST SERPL-MCNC: 186 MG/DL
CO2 SERPL-SCNC: 31 MMOL/L (ref 21–32)
CREAT SERPL-MCNC: 0.73 MG/DL (ref 0.6–1.3)
EST. AVERAGE GLUCOSE BLD GHB EST-MCNC: 108 MG/DL
GFR SERPL CREATININE-BSD FRML MDRD: 94 ML/MIN/1.73SQ M
GLUCOSE P FAST SERPL-MCNC: 94 MG/DL (ref 65–99)
HBA1C MFR BLD: 5.4 %
HDLC SERPL-MCNC: 66 MG/DL
LDLC SERPL CALC-MCNC: 110 MG/DL (ref 0–100)
POTASSIUM SERPL-SCNC: 4.4 MMOL/L (ref 3.5–5.3)
PROT SERPL-MCNC: 6.3 G/DL (ref 6.4–8.4)
SODIUM SERPL-SCNC: 138 MMOL/L (ref 135–147)
TRIGL SERPL-MCNC: 51 MG/DL
TSH SERPL DL<=0.05 MIU/L-ACNC: 1.97 UIU/ML (ref 0.45–4.5)

## 2024-08-13 PROCEDURE — 83036 HEMOGLOBIN GLYCOSYLATED A1C: CPT

## 2024-08-13 PROCEDURE — 84443 ASSAY THYROID STIM HORMONE: CPT

## 2024-08-13 PROCEDURE — 36415 COLL VENOUS BLD VENIPUNCTURE: CPT

## 2024-08-13 PROCEDURE — 80053 COMPREHEN METABOLIC PANEL: CPT

## 2024-08-13 PROCEDURE — 80061 LIPID PANEL: CPT

## 2024-08-29 ENCOUNTER — CONSULT (OUTPATIENT)
Age: 54
End: 2024-08-29
Payer: COMMERCIAL

## 2024-08-29 VITALS
WEIGHT: 140 LBS | SYSTOLIC BLOOD PRESSURE: 103 MMHG | HEART RATE: 63 BPM | DIASTOLIC BLOOD PRESSURE: 68 MMHG | HEIGHT: 61 IN | BODY MASS INDEX: 26.43 KG/M2

## 2024-08-29 DIAGNOSIS — M54.2 CHRONIC NECK PAIN: ICD-10-CM

## 2024-08-29 DIAGNOSIS — G89.29 CHRONIC NECK PAIN: ICD-10-CM

## 2024-08-29 DIAGNOSIS — M99.01 SEGMENTAL DYSFUNCTION OF CERVICAL REGION: Primary | ICD-10-CM

## 2024-08-29 DIAGNOSIS — M99.03 SEGMENTAL DYSFUNCTION OF LUMBAR REGION: ICD-10-CM

## 2024-08-29 DIAGNOSIS — M99.02 SEGMENTAL DYSFUNCTION OF THORACIC REGION: ICD-10-CM

## 2024-08-29 PROCEDURE — 98941 CHIROPRACT MANJ 3-4 REGIONS: CPT | Performed by: CHIROPRACTOR

## 2024-08-29 PROCEDURE — 99203 OFFICE O/P NEW LOW 30 MIN: CPT | Performed by: CHIROPRACTOR

## 2024-08-29 PROCEDURE — 97110 THERAPEUTIC EXERCISES: CPT | Performed by: CHIROPRACTOR

## 2024-08-29 NOTE — PROGRESS NOTES
Diagnoses and all orders for this visit:    Segmental dysfunction of cervical region    Chronic neck pain  -     Ambulatory Referral to Chiropractic    Segmental dysfunction of thoracic region    Segmental dysfunction of lumbar region      ASSESSMENT:  No red flags, radiculopathy or neurologic deficit appreciated clinically. Pt's symptoms and exam findings consistent with mechanical neck/ubp secondary to repetitive st/sp injury, exacerbated by postural/ergonomic stressors. Pt responded well to stretches and manual mobilization of the affected spinal and myofascial tissues with increased ROM; trial of conservative tx recommended consisting of stretching, ther-ex, graded mobilization/manipulation of the affected spinal/myofascial tissues, postural/ergonomic education and take home stretches/exercises. If symptoms fail to improve with short trial of conservative care, appropriate imaging and referral will be coordinated.  Spent greater than 30 min c pt discussing hx, pe, ddx, tx options and reviewing notes/imaging    PROCEDURE CODES: 66162 and 12036, 87382    TREATMENT:  Fear avoidance behavior discussion, encouraged and reassured pt that natural course of condition is to improve over time with adherence to tx plan and home care strategies. Home care recommendations: avoid bed rest, walk (but avoid trails and uneven surfaces), gradual return to activity to tolerance (avoid anything that peripheralizes symptoms), ice 20 min on/off, watch for ice burn, call if symptoms peripheralize, worsen, or neurologic deficit progresses. Ther-ex: IASTM - discussed post procedure soreness and/or ecchymosis for up to 36 hrs, applied to affected mm hypertonicities; wall dick, axial retraction, upper trap stretch, lev scap stretch, SCM stretch, lat rows with t-band, lat pull down with t-band, abdominal bracing; greater than 15 min spent performing above mentioned ther-ex. Thoracic mobilization/manipulation: prone P-A mob; cervical  mobilization/manipulation: diversified supine graded mobilization, cervical traction, prone C/T jct HVLA    HPI:  Elsy Garcia is a 54 y.o. female   Chief Complaint   Patient presents with   • Neck Pain     Neck pain left side constant, going on for years, limited range of motion, about a 5 at its worst, referred by pcp      The patient presents to the office L neck pain with trouble turning head to the L. The patient has this chronic pain that was brought on without trauma. The patient started when the kids were little. No in 20's. Prev trx- Injections several years ago, PT several years ago. 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 The patient walks 6-7 days a week 4-5 miles.  No symptoms into arms or legs. The patient does has HA    Neck Pain   Pertinent negatives include no chest pain, fever, headaches, numbness or weakness.     Past Medical History:   Diagnosis Date   • Disease of thyroid gland       The following portions of the patient's history were reviewed and updated as appropriate: allergies, past family history, past medical history, past social history, past surgical history, and problem list.  Review of Systems   Constitutional:  Negative for activity change, fatigue, fever and unexpected weight change.   HENT:  Negative for ear pain, hearing loss, sinus pressure, sinus pain, sore throat and tinnitus.    Respiratory:  Negative for chest tightness, shortness of breath, wheezing and stridor.    Cardiovascular:  Negative for chest pain.   Genitourinary:  Negative for flank pain and frequency.   Musculoskeletal:  Positive for myalgias, neck pain and neck stiffness. Negative for back pain and joint swelling.   Skin:  Negative for color change and pallor.   Neurological:  Negative for dizziness, speech difficulty, weakness, numbness and headaches.   Psychiatric/Behavioral:  Negative for agitation and sleep disturbance. The patient is not nervous/anxious.       Physical Exam  Constitutional:       General: She is not in acute distress.     Appearance: Normal appearance.   HENT:      Head: Normocephalic.      Mouth/Throat:      Mouth: Mucous membranes are moist.   Eyes:      Extraocular Movements: Extraocular movements intact.      Conjunctiva/sclera: Conjunctivae normal.      Pupils: Pupils are equal, round, and reactive to light.   Neck:      Vascular: No carotid bruit.     Pulmonary:      Effort: Pulmonary effort is normal.   Chest:      Chest wall: No tenderness.   Abdominal:      General: Abdomen is flat.      Palpations: Abdomen is soft.   Musculoskeletal:         General: Tenderness present. No swelling, deformity or signs of injury. Normal range of motion.        Arms:       Cervical back: Normal range of motion. Rigidity and tenderness present.      Right lower leg: No edema.      Left lower leg: No edema.   Lymphadenopathy:      Cervical: No cervical adenopathy.   Skin:     General: Skin is warm.      Coloration: Skin is not jaundiced or pale.      Findings: No bruising or erythema.   Neurological:      Mental Status: She is alert and oriented to person, place, and time.      Cranial Nerves: No cranial nerve deficit.      Sensory: No sensory deficit.      Motor: No weakness.      Gait: Gait is intact.      Deep Tendon Reflexes: Reflexes are normal and symmetric.   Psychiatric:         Attention and Perception: Attention normal.         Mood and Affect: Mood and affect normal.         Speech: Speech normal.         Behavior: Behavior normal. Behavior is cooperative.         Thought Content: Thought content normal.         Cognition and Memory: Cognition normal.         Judgment: Judgment normal.       SOFT TISSUE ASSESSMENT: Hypertonicity and tenderness palpated C5-T6 erector spinae, upper traps, lev scap, SCM, scalene, rhomboid, suboccipitals JOINT RECTRICTIONS: C5-T6 T10-L1 ORTHO: Sammi unremarkable for centralization/peripheralization; max foraminal comp  elicits local np R/L; shoulder depression elicits stiffness in R/L upper trap; brachial plexus tension test elicits neural tension in R/L UE; cervical distraction elicits relieves CC    Return in about 1 week (around 9/5/2024) for Recheck.

## 2024-09-08 DIAGNOSIS — R09.81 NASAL CONGESTION: ICD-10-CM

## 2024-09-08 RX ORDER — FLUTICASONE PROPIONATE 50 MCG
2 SPRAY, SUSPENSION (ML) NASAL
Qty: 16 ML | Refills: 3 | Status: SHIPPED | OUTPATIENT
Start: 2024-09-08

## 2024-09-18 DIAGNOSIS — Z78.0 POSTMENOPAUSAL: ICD-10-CM

## 2024-09-18 DIAGNOSIS — F41.9 ANXIETY: ICD-10-CM

## 2024-09-18 RX ORDER — VENLAFAXINE HYDROCHLORIDE 37.5 MG/1
37.5 CAPSULE, EXTENDED RELEASE ORAL
Qty: 90 CAPSULE | Refills: 1 | Status: SHIPPED | OUTPATIENT
Start: 2024-09-18

## 2024-09-18 NOTE — TELEPHONE ENCOUNTER
Message sent via [employer].   Good morning - I am currently taking venlafaxine  and I will need to have the prescription transferred from [facility] to [facility] because it’s an on-going medication.  I have another prescription (levothyroxine) that [facility] if it helps to look it up.  I am not in an urgent need for the refill - I have about a half bottle of pills left at the  moment.  Please let me know if you need any further information.  Thank you.     Angle Garcia  [phone]

## 2024-09-19 ENCOUNTER — PROCEDURE VISIT (OUTPATIENT)
Age: 54
End: 2024-09-19
Payer: COMMERCIAL

## 2024-09-19 VITALS
BODY MASS INDEX: 26.43 KG/M2 | HEIGHT: 61 IN | SYSTOLIC BLOOD PRESSURE: 107 MMHG | DIASTOLIC BLOOD PRESSURE: 66 MMHG | HEART RATE: 70 BPM | WEIGHT: 140 LBS

## 2024-09-19 DIAGNOSIS — G89.29 CHRONIC NECK PAIN: Primary | ICD-10-CM

## 2024-09-19 DIAGNOSIS — M99.03 SEGMENTAL DYSFUNCTION OF LUMBAR REGION: ICD-10-CM

## 2024-09-19 DIAGNOSIS — M54.2 CHRONIC NECK PAIN: Primary | ICD-10-CM

## 2024-09-19 DIAGNOSIS — M99.02 SEGMENTAL DYSFUNCTION OF THORACIC REGION: ICD-10-CM

## 2024-09-19 DIAGNOSIS — M99.01 SEGMENTAL DYSFUNCTION OF CERVICAL REGION: ICD-10-CM

## 2024-09-19 PROCEDURE — 98941 CHIROPRACT MANJ 3-4 REGIONS: CPT | Performed by: CHIROPRACTOR

## 2024-09-19 PROCEDURE — 97110 THERAPEUTIC EXERCISES: CPT | Performed by: CHIROPRACTOR

## 2024-09-19 NOTE — PROGRESS NOTES
Diagnoses and all orders for this visit:    Chronic neck pain    Segmental dysfunction of cervical region    Segmental dysfunction of thoracic region    Segmental dysfunction of lumbar region      ASSESSMENT:  Pt's symptoms and exam findings consistent with mechanical neck/ubp secondary to repetitive st/sp injury, exacerbated by postural/ergonomic stressors. Pt responded well to stretches and manual mobilization of the affected spinal and myofascial tissues with increased ROM; trial of conservative tx recommended consisting of stretching, ther-ex, graded mobilization/manipulation of the affected spinal/myofascial tissues, postural/ergonomic education and take home stretches/exercises. If symptoms fail to improve with short trial of conservative care, appropriate imaging and referral will be coordinated.  -Tolerated treatment fairly well today with decrease in pain and mm spasm    PROCEDURE CODES: 55689 and 11711    TREATMENT:  Fear avoidance behavior discussion, encouraged and reassured pt that natural course of condition is to improve over time with adherence to tx plan and home care strategies. Home care recommendations: avoid bed rest, walk (but avoid trails and uneven surfaces), gradual return to activity to tolerance (avoid anything that peripheralizes symptoms), ice 20 min on/off, watch for ice burn, call if symptoms peripheralize, worsen, or neurologic deficit progresses. Ther-ex: IASTM - discussed post procedure soreness and/or ecchymosis for up to 36 hrs, applied to affected mm hypertonicities; wall dick, axial retraction, upper trap stretch, lev scap stretch, SCM stretch, lat rows with t-band, lat pull down with t-band, abdominal bracing; greater than 15 min spent performing above mentioned ther-ex. Thoracic mobilization/manipulation: prone P-A mob; cervical mobilization/manipulation: diversified supine graded mobilization, cervical traction, prone C/T jct HVLA    HPI:  Elsy Garcia is a 54 y.o.  female   Chief Complaint   Patient presents with    Neck - Pain     Neck pain is still having trouble turning to the left side.  Pain score 6        The patient presents to the office L neck pain with trouble turning head to the L. The patient has this chronic pain that was brought on without trauma. The patient started when the kids were little. No in 20's. Prev trx- Injections several years ago, PT several years ago. 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 The patient walks 6-7 days a week 4-5 miles.  No symptoms into arms or legs. The patient does has HA  9/19- The patient is feeling the same today then she had improvement     Neck Pain       Past Medical History:   Diagnosis Date    Disease of thyroid gland     Nasal congestion       The following portions of the patient's history were reviewed and updated as appropriate: allergies, past family history, past medical history, past social history, past surgical history, and problem list.  Review of Systems   Musculoskeletal:  Positive for myalgias, neck pain and neck stiffness.     Physical Exam  Constitutional:       Appearance: Normal appearance.   Neck:     Musculoskeletal:         General: Tenderness present. Normal range of motion.        Arms:       Cervical back: Normal range of motion. Rigidity and tenderness present.   Neurological:      Mental Status: She is alert.      Gait: Gait is intact.      Deep Tendon Reflexes: Reflexes are normal and symmetric.   Psychiatric:         Attention and Perception: Attention normal.         Mood and Affect: Affect normal.         Speech: Speech normal.         Behavior: Behavior is cooperative.         Cognition and Memory: Cognition normal.       SOFT TISSUE ASSESSMENT: Hypertonicity and tenderness palpated C5-T6 erector spinae, upper traps, lev scap, SCM, scalene, rhomboid, suboccipitals JOINT RECTRICTIONS: C5-T6 T10-L1 ORTHO: Sammi unremarkable for  centralization/peripheralization; max foraminal comp elicits local np R/L; shoulder depression elicits stiffness in R/L upper trap; brachial plexus tension test elicits neural tension in R/L UE; cervical distraction elicits relieves CC    Return in about 1 week (around 9/26/2024) for Recheck.

## 2024-09-24 ENCOUNTER — PROCEDURE VISIT (OUTPATIENT)
Age: 54
End: 2024-09-24
Payer: COMMERCIAL

## 2024-09-24 VITALS
SYSTOLIC BLOOD PRESSURE: 108 MMHG | DIASTOLIC BLOOD PRESSURE: 73 MMHG | HEART RATE: 78 BPM | HEIGHT: 61 IN | WEIGHT: 140 LBS | BODY MASS INDEX: 26.43 KG/M2

## 2024-09-24 DIAGNOSIS — M54.2 CHRONIC NECK PAIN: Primary | ICD-10-CM

## 2024-09-24 DIAGNOSIS — G89.29 CHRONIC NECK PAIN: Primary | ICD-10-CM

## 2024-09-24 DIAGNOSIS — M99.02 SEGMENTAL DYSFUNCTION OF THORACIC REGION: ICD-10-CM

## 2024-09-24 DIAGNOSIS — M99.03 SEGMENTAL DYSFUNCTION OF LUMBAR REGION: ICD-10-CM

## 2024-09-24 DIAGNOSIS — M99.01 SEGMENTAL DYSFUNCTION OF CERVICAL REGION: ICD-10-CM

## 2024-09-24 PROCEDURE — 97110 THERAPEUTIC EXERCISES: CPT | Performed by: CHIROPRACTOR

## 2024-09-24 PROCEDURE — 98941 CHIROPRACT MANJ 3-4 REGIONS: CPT | Performed by: CHIROPRACTOR

## 2024-09-24 NOTE — PROGRESS NOTES
Diagnoses and all orders for this visit:    Chronic neck pain    Segmental dysfunction of cervical region    Segmental dysfunction of thoracic region    Segmental dysfunction of lumbar region      ASSESSMENT:  Pt's symptoms and exam findings consistent with mechanical neck/ubp secondary to repetitive st/sp injury, exacerbated by postural/ergonomic stressors. Pt responded well to stretches and manual mobilization of the affected spinal and myofascial tissues with increased ROM; trial of conservative tx recommended consisting of stretching, ther-ex, graded mobilization/manipulation of the affected spinal/myofascial tissues, postural/ergonomic education and take home stretches/exercises. If symptoms fail to improve with short trial of conservative care, appropriate imaging and referral will be coordinated.  -Tolerated treatment fairly well today with decrease in pain and mm spasm    PROCEDURE CODES: 13170 and 29734    TREATMENT:  Fear avoidance behavior discussion, encouraged and reassured pt that natural course of condition is to improve over time with adherence to tx plan and home care strategies. Home care recommendations: avoid bed rest, walk (but avoid trails and uneven surfaces), gradual return to activity to tolerance (avoid anything that peripheralizes symptoms), ice 20 min on/off, watch for ice burn, call if symptoms peripheralize, worsen, or neurologic deficit progresses. Ther-ex: IASTM - discussed post procedure soreness and/or ecchymosis for up to 36 hrs, applied to affected mm hypertonicities; wall dick, axial retraction, upper trap stretch, lev scap stretch, SCM stretch, lat rows with t-band, lat pull down with t-band, abdominal bracing; greater than 15 min spent performing above mentioned ther-ex. Thoracic mobilization/manipulation: prone P-A mob; cervical mobilization/manipulation: diversified supine graded mobilization, cervical traction, prone C/T jct HVLA    HPI:  Elsy Garcia is a 54 y.o.  female   Chief Complaint   Patient presents with   • Neck - Pain     Neck pain score 6   Patient states sore on the left side        The patient presents to the office L neck pain with trouble turning head to the L. The patient has this chronic pain that was brought on without trauma. The patient started when the kids were little. No in 20's. Prev trx- Injections several years ago, PT several years ago. 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 The patient walks 6-7 days a week 4-5 miles.  No symptoms into arms or legs. The patient does has HA  9/19- The patient is feeling the same today then she had improvement   9/24- The patient felt good after last visit lasted for about 2 days.     Neck Pain     Past Medical History:   Diagnosis Date   • Disease of thyroid gland    • Nasal congestion       The following portions of the patient's history were reviewed and updated as appropriate: allergies, past family history, past medical history, past social history, past surgical history, and problem list.  Review of Systems   Musculoskeletal:  Positive for myalgias, neck pain and neck stiffness.     Physical Exam  Constitutional:       Appearance: Normal appearance.   Neck:     Musculoskeletal:         General: Tenderness present. Normal range of motion.        Arms:       Cervical back: Normal range of motion. Rigidity and tenderness present.   Neurological:      Mental Status: She is alert.      Gait: Gait is intact.      Deep Tendon Reflexes: Reflexes are normal and symmetric.   Psychiatric:         Attention and Perception: Attention normal.         Mood and Affect: Affect normal.         Speech: Speech normal.         Behavior: Behavior is cooperative.         Cognition and Memory: Cognition normal.     SOFT TISSUE ASSESSMENT: Hypertonicity and tenderness palpated C5-T6 erector spinae, upper traps, lev scap, SCM, scalene, rhomboid, suboccipitals JOINT RECTRICTIONS: C5-T6  T10-L1 ORTHO: Sammi unremarkable for centralization/peripheralization; max foraminal comp elicits local np R/L; shoulder depression elicits stiffness in R/L upper trap; brachial plexus tension test elicits neural tension in R/L UE; cervical distraction elicits relieves CC    Return in about 1 week (around 10/1/2024) for Recheck.

## 2024-10-08 ENCOUNTER — PROCEDURE VISIT (OUTPATIENT)
Age: 54
End: 2024-10-08
Payer: COMMERCIAL

## 2024-10-08 VITALS
HEART RATE: 62 BPM | SYSTOLIC BLOOD PRESSURE: 117 MMHG | BODY MASS INDEX: 26.43 KG/M2 | HEIGHT: 61 IN | DIASTOLIC BLOOD PRESSURE: 77 MMHG | WEIGHT: 140 LBS

## 2024-10-08 DIAGNOSIS — G89.29 CHRONIC NECK PAIN: Primary | ICD-10-CM

## 2024-10-08 DIAGNOSIS — M99.01 SEGMENTAL DYSFUNCTION OF CERVICAL REGION: ICD-10-CM

## 2024-10-08 DIAGNOSIS — M99.03 SEGMENTAL DYSFUNCTION OF LUMBAR REGION: ICD-10-CM

## 2024-10-08 DIAGNOSIS — M99.02 SEGMENTAL DYSFUNCTION OF THORACIC REGION: ICD-10-CM

## 2024-10-08 DIAGNOSIS — M54.2 CHRONIC NECK PAIN: Primary | ICD-10-CM

## 2024-10-08 PROCEDURE — 98941 CHIROPRACT MANJ 3-4 REGIONS: CPT | Performed by: CHIROPRACTOR

## 2024-10-08 PROCEDURE — 97110 THERAPEUTIC EXERCISES: CPT | Performed by: CHIROPRACTOR

## 2024-10-08 NOTE — PROGRESS NOTES
Diagnoses and all orders for this visit:    Chronic neck pain    Segmental dysfunction of cervical region    Segmental dysfunction of thoracic region    Segmental dysfunction of lumbar region      ASSESSMENT:  Pt's symptoms and exam findings consistent with mechanical neck/ubp secondary to repetitive st/sp injury, exacerbated by postural/ergonomic stressors. Pt responded well to stretches and manual mobilization of the affected spinal and myofascial tissues with increased ROM; trial of conservative tx recommended consisting of stretching, ther-ex, graded mobilization/manipulation of the affected spinal/myofascial tissues, postural/ergonomic education and take home stretches/exercises. If symptoms fail to improve with short trial of conservative care, appropriate imaging and referral will be coordinated.  -Tolerated treatment fairly well today with decrease in pain and mm spasm    PROCEDURE CODES: 61576 and 15941    TREATMENT:  Fear avoidance behavior discussion, encouraged and reassured pt that natural course of condition is to improve over time with adherence to tx plan and home care strategies. Home care recommendations: avoid bed rest, walk (but avoid trails and uneven surfaces), gradual return to activity to tolerance (avoid anything that peripheralizes symptoms), ice 20 min on/off, watch for ice burn, call if symptoms peripheralize, worsen, or neurologic deficit progresses. Ther-ex: IASTM - discussed post procedure soreness and/or ecchymosis for up to 36 hrs, applied to affected mm hypertonicities; wall dick, axial retraction, upper trap stretch, lev scap stretch, SCM stretch, lat rows with t-band, lat pull down with t-band, abdominal bracing; greater than 15 min spent performing above mentioned ther-ex. Thoracic mobilization/manipulation: prone P-A mob; cervical mobilization/manipulation: diversified supine graded mobilization, cervical traction, prone C/T jct HVLA    HPI:  Elsy Garcia is a 54 y.o.  female   Chief Complaint   Patient presents with    Neck - Pain     Neck pain on the left side is slightly better. Pain score 3 when turning towards the left      The patient presents to the office L neck pain with trouble turning head to the L. The patient has this chronic pain that was brought on without trauma. The patient started when the kids were little. No in 20's. Prev trx- Injections several years ago, PT several years ago. 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 The patient walks 6-7 days a week 4-5 miles.  No symptoms into arms or legs. The patient does has HA  9/19- The patient is feeling the same today then she had improvement   9/24- The patient felt good after last visit lasted for about 2 days.   10/8- The patient is feeling a little better overall 3/10 pain    Neck Pain       Past Medical History:   Diagnosis Date    Disease of thyroid gland     Nasal congestion       The following portions of the patient's history were reviewed and updated as appropriate: allergies, past family history, past medical history, past social history, past surgical history, and problem list.  Review of Systems   Musculoskeletal:  Positive for myalgias, neck pain and neck stiffness.     Physical Exam  Constitutional:       Appearance: Normal appearance.   Neck:     Musculoskeletal:         General: Tenderness present. Normal range of motion.        Arms:       Cervical back: Normal range of motion. Rigidity and tenderness present.   Neurological:      Mental Status: She is alert.      Gait: Gait is intact.      Deep Tendon Reflexes: Reflexes are normal and symmetric.   Psychiatric:         Attention and Perception: Attention normal.         Mood and Affect: Affect normal.         Speech: Speech normal.         Behavior: Behavior is cooperative.         Cognition and Memory: Cognition normal.     SOFT TISSUE ASSESSMENT: Hypertonicity and tenderness palpated C5-T6 erector  spinae, upper traps, lev scap, SCM, scalene, rhomboid, suboccipitals JOINT RECTRICTIONS: C5-T6 T10-L1 ORTHO: Sammi unremarkable for centralization/peripheralization; max foraminal comp elicits local np R/L; shoulder depression elicits stiffness in R/L upper trap; brachial plexus tension test elicits neural tension in R/L UE; cervical distraction elicits relieves CC    Return in about 1 week (around 10/15/2024) for Recheck.

## 2024-10-15 ENCOUNTER — OFFICE VISIT (OUTPATIENT)
Dept: INTERNAL MEDICINE CLINIC | Facility: CLINIC | Age: 54
End: 2024-10-15
Payer: COMMERCIAL

## 2024-10-15 ENCOUNTER — PROCEDURE VISIT (OUTPATIENT)
Age: 54
End: 2024-10-15
Payer: COMMERCIAL

## 2024-10-15 VITALS
HEART RATE: 78 BPM | HEIGHT: 61 IN | SYSTOLIC BLOOD PRESSURE: 97 MMHG | BODY MASS INDEX: 25.49 KG/M2 | DIASTOLIC BLOOD PRESSURE: 70 MMHG | WEIGHT: 135 LBS

## 2024-10-15 VITALS
OXYGEN SATURATION: 99 % | DIASTOLIC BLOOD PRESSURE: 80 MMHG | WEIGHT: 135 LBS | HEIGHT: 61 IN | SYSTOLIC BLOOD PRESSURE: 110 MMHG | HEART RATE: 65 BPM | RESPIRATION RATE: 20 BRPM | BODY MASS INDEX: 25.49 KG/M2

## 2024-10-15 DIAGNOSIS — G89.29 CHRONIC NECK PAIN: Primary | ICD-10-CM

## 2024-10-15 DIAGNOSIS — M99.01 SEGMENTAL DYSFUNCTION OF CERVICAL REGION: ICD-10-CM

## 2024-10-15 DIAGNOSIS — M99.02 SEGMENTAL DYSFUNCTION OF THORACIC REGION: ICD-10-CM

## 2024-10-15 DIAGNOSIS — Z23 NEED FOR INFLUENZA VACCINATION: Primary | ICD-10-CM

## 2024-10-15 DIAGNOSIS — M54.2 CHRONIC NECK PAIN: Primary | ICD-10-CM

## 2024-10-15 DIAGNOSIS — Z78.0 POSTMENOPAUSAL: ICD-10-CM

## 2024-10-15 DIAGNOSIS — F41.9 ANXIETY: ICD-10-CM

## 2024-10-15 DIAGNOSIS — M99.03 SEGMENTAL DYSFUNCTION OF LUMBAR REGION: ICD-10-CM

## 2024-10-15 PROCEDURE — 98941 CHIROPRACT MANJ 3-4 REGIONS: CPT | Performed by: CHIROPRACTOR

## 2024-10-15 PROCEDURE — 99213 OFFICE O/P EST LOW 20 MIN: CPT | Performed by: INTERNAL MEDICINE

## 2024-10-15 PROCEDURE — 90673 RIV3 VACCINE NO PRESERV IM: CPT

## 2024-10-15 PROCEDURE — 97110 THERAPEUTIC EXERCISES: CPT | Performed by: CHIROPRACTOR

## 2024-10-15 PROCEDURE — 90471 IMMUNIZATION ADMIN: CPT

## 2024-10-15 RX ORDER — VENLAFAXINE HYDROCHLORIDE 75 MG/1
75 CAPSULE, EXTENDED RELEASE ORAL
Qty: 30 CAPSULE | Refills: 3 | Status: SHIPPED | OUTPATIENT
Start: 2024-10-15

## 2024-10-15 NOTE — PROGRESS NOTES
Diagnoses and all orders for this visit:    Chronic neck pain    Segmental dysfunction of cervical region    Segmental dysfunction of thoracic region    Segmental dysfunction of lumbar region      ASSESSMENT:  Pt's symptoms and exam findings consistent with mechanical neck/ubp secondary to repetitive st/sp injury, exacerbated by postural/ergonomic stressors. Pt responded well to stretches and manual mobilization of the affected spinal and myofascial tissues with increased ROM; trial of conservative tx recommended consisting of stretching, ther-ex, graded mobilization/manipulation of the affected spinal/myofascial tissues, postural/ergonomic education and take home stretches/exercises. If symptoms fail to improve with short trial of conservative care, appropriate imaging and referral will be coordinated.  -Tolerated treatment fairly well today with decrease in pain and mm spasm    PROCEDURE CODES: 42235 and 54633    TREATMENT:  Fear avoidance behavior discussion, encouraged and reassured pt that natural course of condition is to improve over time with adherence to tx plan and home care strategies. Home care recommendations: avoid bed rest, walk (but avoid trails and uneven surfaces), gradual return to activity to tolerance (avoid anything that peripheralizes symptoms), ice 20 min on/off, watch for ice burn, call if symptoms peripheralize, worsen, or neurologic deficit progresses. Ther-ex: IASTM - discussed post procedure soreness and/or ecchymosis for up to 36 hrs, applied to affected mm hypertonicities; wall dick, axial retraction, upper trap stretch, lev scap stretch, SCM stretch, lat rows with t-band, lat pull down with t-band, abdominal bracing; greater than 15 min spent performing above mentioned ther-ex. Thoracic mobilization/manipulation: prone P-A mob; cervical mobilization/manipulation: diversified supine graded mobilization, cervical traction, prone C/T jct HVLA    HPI:  Elsy Garcia is a 54 y.o.  female   Chief Complaint   Patient presents with    Neck Pain     Neck pain about a 4 left side     Back Pain     Low back no pain      The patient presents to the office L neck pain with trouble turning head to the L. The patient has this chronic pain that was brought on without trauma. The patient started when the kids were little. No in 20's. Prev trx- Injections several years ago, PT several years ago. 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 The patient walks 6-7 days a week 4-5 miles.  No symptoms into arms or legs. The patient does has HA  9/19- The patient is feeling the same today then she had improvement   9/24- The patient felt good after last visit lasted for about 2 days.   10/8- The patient is feeling a little better overall 3/10 pain  10/15- The patient is feeling a little about the same today but has some improvements after her last visit.    Neck Pain     Back Pain      Past Medical History:   Diagnosis Date    Disease of thyroid gland     Nasal congestion       The following portions of the patient's history were reviewed and updated as appropriate: allergies, past family history, past medical history, past social history, past surgical history, and problem list.  Review of Systems   Musculoskeletal:  Positive for back pain, myalgias, neck pain and neck stiffness.     Physical Exam  Constitutional:       Appearance: Normal appearance.   Neck:     Musculoskeletal:         General: Tenderness present. Normal range of motion.        Arms:       Cervical back: Normal range of motion. Rigidity and tenderness present.   Neurological:      Mental Status: She is alert.      Gait: Gait is intact.      Deep Tendon Reflexes: Reflexes are normal and symmetric.   Psychiatric:         Attention and Perception: Attention normal.         Mood and Affect: Affect normal.         Speech: Speech normal.         Behavior: Behavior is cooperative.         Cognition and  Memory: Cognition normal.       SOFT TISSUE ASSESSMENT: Hypertonicity and tenderness palpated C5-T6 erector spinae, upper traps, lev scap, SCM, scalene, rhomboid, suboccipitals JOINT RECTRICTIONS: C5-T6 T10-L1 ORTHO: Sammi unremarkable for centralization/peripheralization; max foraminal comp elicits local np R/L; shoulder depression elicits stiffness in R/L upper trap; brachial plexus tension test elicits neural tension in R/L UE; cervical distraction elicits relieves CC    Return in about 1 week (around 10/22/2024) for Recheck.

## 2024-10-15 NOTE — PROGRESS NOTES
Ambulatory Visit  Name: Elsy Garcia      : 1970      MRN: [id number]  Encounter Provider: Timothy Fierro DO  Encounter Date: 10/15/2024   Encounter department: MEDICAL [facility]    Assessment & Plan  Postmenopausal  Ongoing hot flushes may be a little bit of improvement since starting Effexor she is tolerating Effexor well with improvements and her anxiety levels and sleep we will increase Effexor XR 75 mg once daily patient has made appointment with GYN postmenopausal specialist Dr. Lindsey Cabrales    Orders:    venlafaxine (EFFEXOR-XR) 75 mg 24 hr capsule; Take 1 capsule (75 mg total) by mouth daily with breakfast    Anxiety  Improving symptoms will increase Effexor XR to 75 mg once daily  Orders:    venlafaxine (EFFEXOR-XR) 75 mg 24 hr capsule; Take 1 capsule (75 mg total) by mouth daily with breakfast    Need for influenza vaccination  -Counseled the patient on the importance she will receive her flu vaccination  Orders:    influenza vaccine, recombinant, PF, 0.5 mL IM (Flublok)       History of Present Illness     HPI 54-year old female coming in for a follow up office visit regarding postmenopausal hot flashes, anxiety, chronic neck pain; the patient reports me compliant taking medications without untoward side effects the.  The patient is here to review his medical condition, update me on the medical condition and the patient reports me no hospitalizations and no ER visits.  No injuries no illnesses patient reports me improvements in anxiety level and sleep but still ongoing postmenopausal hot flashes with only little improvement since starting Effexor.  She reports me overall she is tolerating the Effexor without side effects she is now working with chiropractor Dr. Grajeda who is very helpful she will continue to have ongoing care.  Sleep better not as severe hot flushes more calm  Reports me walking daily following healthy and balanced diet  History obtained from :  "patient  Review of Systems   Constitutional:  Negative for activity change, appetite change and unexpected weight change.   HENT:  Negative for congestion and postnasal drip.    Eyes:  Negative for visual disturbance.   Respiratory:  Negative for cough and shortness of breath.    Cardiovascular:  Negative for chest pain.   Gastrointestinal:  Negative for abdominal pain, diarrhea, nausea and vomiting.   Neurological:  Negative for dizziness, light-headedness and headaches.   Hematological:  Negative for adenopathy.   Psychiatric/Behavioral:  The patient is not nervous/anxious.      Social History     Tobacco Use    Smoking status: Never    Smokeless tobacco: Never   Vaping Use    Vaping status: Never Used   Substance and Sexual Activity    Alcohol use: Not Currently     Alcohol/week: 1.0 standard drink of alcohol     Comment: Socially /weekend- 1-2 wine coolers max    Drug use: No    Sexual activity: Not Currently     Partners: Male     Birth control/protection: Post-menopausal     Comment: Tubal         Objective     /80 (BP Location: Left arm, Patient Position: Sitting, Cuff Size: Standard)   Pulse 65   Resp 20   Ht 5' 1\" (1.549 m)   Wt 61.2 kg (135 lb)   LMP 09/04/2020 (Exact Date)   SpO2 99%   BMI 25.51 kg/m²     Physical Exam  Vitals and nursing note reviewed.   Constitutional:       General: She is not in acute distress.     Appearance: Normal appearance. She is well-developed. She is not ill-appearing, toxic-appearing or diaphoretic.   HENT:      Head: Normocephalic and atraumatic.      Right Ear: External ear normal.      Left Ear: External ear normal.      Nose: Nose normal.      Mouth/Throat:      Mouth: Oropharynx is clear and moist.   Eyes:      Pupils: Pupils are equal, round, and reactive to light.   Cardiovascular:      Rate and Rhythm: Normal rate and regular rhythm.      Heart sounds: Normal heart sounds. No murmur heard.  Pulmonary:      Effort: Pulmonary effort is normal.      Breath " sounds: Normal breath sounds.   Abdominal:      General: There is no distension.      Palpations: Abdomen is soft.      Tenderness: There is no abdominal tenderness. There is no guarding.   Musculoskeletal:         General: No edema.   Psychiatric:         Mood and Affect: Mood is anxious. Mood is not depressed.         Thought Content: Thought content does not include suicidal ideation.

## 2024-10-15 NOTE — ASSESSMENT & PLAN NOTE
Ongoing hot flushes may be a little bit of improvement since starting Effexor she is tolerating Effexor well with improvements and her anxiety levels and sleep we will increase Effexor XR 75 mg once daily patient has made appointment with GYN postmenopausal specialist Dr. Lindsey Cabrales    Orders:    venlafaxine (EFFEXOR-XR) 75 mg 24 hr capsule; Take 1 capsule (75 mg total) by mouth daily with breakfast

## 2024-10-29 DIAGNOSIS — E03.9 HYPOTHYROIDISM, UNSPECIFIED TYPE: ICD-10-CM

## 2024-10-29 RX ORDER — LEVOTHYROXINE SODIUM 50 UG/1
TABLET ORAL
Qty: 90 TABLET | Refills: 3 | Status: SHIPPED | OUTPATIENT
Start: 2024-10-29

## 2024-10-31 ENCOUNTER — PROCEDURE VISIT (OUTPATIENT)
Age: 54
End: 2024-10-31
Payer: COMMERCIAL

## 2024-10-31 VITALS
BODY MASS INDEX: 25.49 KG/M2 | HEART RATE: 68 BPM | WEIGHT: 135 LBS | SYSTOLIC BLOOD PRESSURE: 94 MMHG | DIASTOLIC BLOOD PRESSURE: 68 MMHG | HEIGHT: 61 IN

## 2024-10-31 DIAGNOSIS — M99.03 SEGMENTAL DYSFUNCTION OF LUMBAR REGION: ICD-10-CM

## 2024-10-31 DIAGNOSIS — G89.29 CHRONIC NECK PAIN: Primary | ICD-10-CM

## 2024-10-31 DIAGNOSIS — M54.2 CHRONIC NECK PAIN: Primary | ICD-10-CM

## 2024-10-31 DIAGNOSIS — M99.01 SEGMENTAL DYSFUNCTION OF CERVICAL REGION: ICD-10-CM

## 2024-10-31 DIAGNOSIS — M99.02 SEGMENTAL DYSFUNCTION OF THORACIC REGION: ICD-10-CM

## 2024-10-31 PROCEDURE — 97110 THERAPEUTIC EXERCISES: CPT | Performed by: CHIROPRACTOR

## 2024-10-31 PROCEDURE — 98941 CHIROPRACT MANJ 3-4 REGIONS: CPT | Performed by: CHIROPRACTOR

## 2024-10-31 NOTE — PROGRESS NOTES
Diagnoses and all orders for this visit:    Chronic neck pain    Segmental dysfunction of cervical region    Segmental dysfunction of thoracic region    Segmental dysfunction of lumbar region      ASSESSMENT:  Pt's symptoms and exam findings consistent with mechanical neck/ubp secondary to repetitive st/sp injury, exacerbated by postural/ergonomic stressors. Pt responded well to stretches and manual mobilization of the affected spinal and myofascial tissues with increased ROM; trial of conservative tx recommended consisting of stretching, ther-ex, graded mobilization/manipulation of the affected spinal/myofascial tissues, postural/ergonomic education and take home stretches/exercises. If symptoms fail to improve with short trial of conservative care, appropriate imaging and referral will be coordinated.  -Tolerated treatment fairly well today with decrease in pain and mm spasm    PROCEDURE CODES: 17205 and 03677    TREATMENT:  Fear avoidance behavior discussion, encouraged and reassured pt that natural course of condition is to improve over time with adherence to tx plan and home care strategies. Home care recommendations: avoid bed rest, walk (but avoid trails and uneven surfaces), gradual return to activity to tolerance (avoid anything that peripheralizes symptoms), ice 20 min on/off, watch for ice burn, call if symptoms peripheralize, worsen, or neurologic deficit progresses. Ther-ex: IASTM - discussed post procedure soreness and/or ecchymosis for up to 36 hrs, applied to affected mm hypertonicities; wall dick, axial retraction, upper trap stretch, lev scap stretch, SCM stretch, lat rows with t-band, lat pull down with t-band, abdominal bracing; greater than 15 min spent performing above mentioned ther-ex. Thoracic mobilization/manipulation: prone P-A mob; cervical mobilization/manipulation: diversified supine graded mobilization, cervical traction, prone C/T jct HVLA    HPI:  Elsy Garcia is a 54 y.o.  female   Chief Complaint   Patient presents with   • Neck - Pain     Neck is sore when patient turns to the left. Patient states feeling better. Pain score 3-4       The patient presents to the office L neck pain with trouble turning head to the L. The patient has this chronic pain that was brought on without trauma. The patient started when the kids were little. No in 20's. Prev trx- Injections several years ago, PT several years ago. 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 The patient walks 6-7 days a week 4-5 miles.  No symptoms into arms or legs. The patient does has HA  9/19- The patient is feeling the same today then she had improvement   9/24- The patient felt good after last visit lasted for about 2 days.   10/8- The patient is feeling a little better overall 3/10 pain  10/15- The patient is feeling a little about the same today but has some improvements after her last visit.  10/31- The patient is feeling is feeling a little better every visit, she mentions she can turn her head a little better.     Neck Pain     Back Pain    Past Medical History:   Diagnosis Date   • Disease of thyroid gland    • Nasal congestion       The following portions of the patient's history were reviewed and updated as appropriate: allergies, past family history, past medical history, past social history, past surgical history, and problem list.  Review of Systems   Musculoskeletal:  Positive for back pain, myalgias, neck pain and neck stiffness.     Physical Exam  Constitutional:       Appearance: Normal appearance.   Neck:     Musculoskeletal:         General: Tenderness present. Normal range of motion.        Arms:       Cervical back: Normal range of motion. Rigidity and tenderness present.   Neurological:      Mental Status: She is alert.      Gait: Gait is intact.      Deep Tendon Reflexes: Reflexes are normal and symmetric.   Psychiatric:         Attention and Perception:  Attention normal.         Mood and Affect: Affect normal.         Speech: Speech normal.         Behavior: Behavior is cooperative.         Cognition and Memory: Cognition normal.     SOFT TISSUE ASSESSMENT: Hypertonicity and tenderness palpated C5-T6 erector spinae, upper traps, lev scap, SCM, scalene, rhomboid, suboccipitals JOINT RECTRICTIONS: C5-T6 T10-L1 ORTHO: Sammi unremarkable for centralization/peripheralization; max foraminal comp elicits local np R/L; shoulder depression elicits stiffness in R/L upper trap; brachial plexus tension test elicits neural tension in R/L UE; cervical distraction elicits relieves CC    Return in about 1 week (around 11/7/2024) for Recheck.

## 2024-11-12 ENCOUNTER — PROCEDURE VISIT (OUTPATIENT)
Age: 54
End: 2024-11-12
Payer: COMMERCIAL

## 2024-11-12 VITALS
DIASTOLIC BLOOD PRESSURE: 63 MMHG | BODY MASS INDEX: 25.49 KG/M2 | SYSTOLIC BLOOD PRESSURE: 98 MMHG | WEIGHT: 135 LBS | HEIGHT: 61 IN | HEART RATE: 69 BPM

## 2024-11-12 DIAGNOSIS — M99.02 SEGMENTAL DYSFUNCTION OF THORACIC REGION: ICD-10-CM

## 2024-11-12 DIAGNOSIS — G89.29 CHRONIC NECK PAIN: Primary | ICD-10-CM

## 2024-11-12 DIAGNOSIS — M99.03 SEGMENTAL DYSFUNCTION OF LUMBAR REGION: ICD-10-CM

## 2024-11-12 DIAGNOSIS — M54.2 CHRONIC NECK PAIN: Primary | ICD-10-CM

## 2024-11-12 DIAGNOSIS — M99.01 SEGMENTAL DYSFUNCTION OF CERVICAL REGION: ICD-10-CM

## 2024-11-12 PROCEDURE — 97110 THERAPEUTIC EXERCISES: CPT | Performed by: CHIROPRACTOR

## 2024-11-12 PROCEDURE — 98941 CHIROPRACT MANJ 3-4 REGIONS: CPT | Performed by: CHIROPRACTOR

## 2024-11-12 NOTE — PROGRESS NOTES
Diagnoses and all orders for this visit:    Chronic neck pain    Segmental dysfunction of cervical region    Segmental dysfunction of thoracic region    Segmental dysfunction of lumbar region      ASSESSMENT:  Pt's symptoms and exam findings consistent with mechanical neck/ubp secondary to repetitive st/sp injury, exacerbated by postural/ergonomic stressors. Pt responded well to stretches and manual mobilization of the affected spinal and myofascial tissues with increased ROM; trial of conservative tx recommended consisting of stretching, ther-ex, graded mobilization/manipulation of the affected spinal/myofascial tissues, postural/ergonomic education and take home stretches/exercises. If symptoms fail to improve with short trial of conservative care, appropriate imaging and referral will be coordinated.  -Tolerated treatment fairly well today with decrease in pain and mm spasm    PROCEDURE CODES: 63207 and 74070    TREATMENT:  Fear avoidance behavior discussion, encouraged and reassured pt that natural course of condition is to improve over time with adherence to tx plan and home care strategies. Home care recommendations: avoid bed rest, walk (but avoid trails and uneven surfaces), gradual return to activity to tolerance (avoid anything that peripheralizes symptoms), ice 20 min on/off, watch for ice burn, call if symptoms peripheralize, worsen, or neurologic deficit progresses. Ther-ex: IASTM - discussed post procedure soreness and/or ecchymosis for up to 36 hrs, applied to affected mm hypertonicities; wall dick, axial retraction, upper trap stretch, lev scap stretch, SCM stretch, lat rows with t-band, lat pull down with t-band, abdominal bracing; greater than 15 min spent performing above mentioned ther-ex. Thoracic mobilization/manipulation: prone P-A mob; cervical mobilization/manipulation: diversified supine graded mobilization, cervical traction, prone C/T jct HVLA    HPI:  Elsy Garcia is a 54 y.o.  female   Chief Complaint   Patient presents with    Neck - Pain     The patient presents to the office L neck pain with trouble turning head to the L. The patient has this chronic pain that was brought on without trauma. The patient started when the kids were little. No in 20's. Prev trx- Injections several years ago, PT several years ago. 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 The patient walks 6-7 days a week 4-5 miles.  No symptoms into arms or legs. The patient does has HA  9/19- The patient is feeling the same today then she had improvement   9/24- The patient felt good after last visit lasted for about 2 days.   10/8- The patient is feeling a little better overall 3/10 pain  10/15- The patient is feeling a little about the same today but has some improvements after her last visit.  10/31- The patient is feeling is feeling a little better every visit, she mentions she can turn her head a little better.   11/12- The pt is feeling stiff and achy this morning in the neck in the morning.    Neck Pain     Back Pain      Past Medical History:   Diagnosis Date    Disease of thyroid gland     Nasal congestion       The following portions of the patient's history were reviewed and updated as appropriate: allergies, past family history, past medical history, past social history, past surgical history, and problem list.  Review of Systems   Musculoskeletal:  Positive for back pain, myalgias, neck pain and neck stiffness.     Physical Exam  Constitutional:       Appearance: Normal appearance.   Neck:     Musculoskeletal:         General: Tenderness present. Normal range of motion.        Arms:       Cervical back: Normal range of motion. Rigidity and tenderness present.   Neurological:      Mental Status: She is alert.      Gait: Gait is intact.      Deep Tendon Reflexes: Reflexes are normal and symmetric.   Psychiatric:         Attention and Perception: Attention normal.          Mood and Affect: Affect normal.         Speech: Speech normal.         Behavior: Behavior is cooperative.         Cognition and Memory: Cognition normal.     SOFT TISSUE ASSESSMENT: Hypertonicity and tenderness palpated C5-T6 erector spinae, upper traps, lev scap, SCM, scalene, rhomboid, suboccipitals JOINT RECTRICTIONS: C5-T6 T10-L1 ORTHO: Sammi unremarkable for centralization/peripheralization; max foraminal comp elicits local np R/L; shoulder depression elicits stiffness in R/L upper trap; brachial plexus tension test elicits neural tension in R/L UE; cervical distraction elicits relieves CC    Return in about 1 week (around 11/19/2024) for Recheck.

## 2024-12-30 DIAGNOSIS — F41.9 ANXIETY: ICD-10-CM

## 2024-12-30 DIAGNOSIS — Z78.0 POSTMENOPAUSAL: ICD-10-CM

## 2024-12-31 RX ORDER — VENLAFAXINE HYDROCHLORIDE 75 MG/1
75 CAPSULE, EXTENDED RELEASE ORAL
Qty: 90 CAPSULE | Refills: 0 | Status: SHIPPED | OUTPATIENT
Start: 2024-12-31

## 2025-01-14 ENCOUNTER — ANNUAL EXAM (OUTPATIENT)
Dept: OBGYN CLINIC | Facility: CLINIC | Age: 55
End: 2025-01-14
Payer: COMMERCIAL

## 2025-01-14 VITALS — BODY MASS INDEX: 24.9 KG/M2 | WEIGHT: 131.8 LBS | SYSTOLIC BLOOD PRESSURE: 110 MMHG | DIASTOLIC BLOOD PRESSURE: 70 MMHG

## 2025-01-14 DIAGNOSIS — Z01.419 WOMEN'S ANNUAL ROUTINE GYNECOLOGICAL EXAMINATION: Primary | ICD-10-CM

## 2025-01-14 DIAGNOSIS — Z12.31 ENCOUNTER FOR SCREENING MAMMOGRAM FOR MALIGNANT NEOPLASM OF BREAST: ICD-10-CM

## 2025-01-14 DIAGNOSIS — N95.2 ATROPHIC VAGINITIS: ICD-10-CM

## 2025-01-14 DIAGNOSIS — Z01.419 PAP SMEAR, AS PART OF ROUTINE GYNECOLOGICAL EXAMINATION: ICD-10-CM

## 2025-01-14 PROCEDURE — 99396 PREV VISIT EST AGE 40-64: CPT | Performed by: OBSTETRICS & GYNECOLOGY

## 2025-01-14 NOTE — PROGRESS NOTES
Assessment/Plan:    No problem-specific Assessment & Plan notes found for this encounter.       Diagnoses and all orders for this visit:    Women's annual routine gynecological examination  -     Thinprep Tis and HPV mRNA E6/E7; Future    Pap smear, as part of routine gynecological examination    Encounter for screening mammogram for malignant neoplasm of breast    Atrophic vaginitis          Normal gynecological physical examination.  Self-breast examination stressed.  Mammogram ordered.  Discussed regular exercise, healthy diet, importance of vitamin D and calcium supplements.  Discussed importance of sun block use during periods of prolonged sun exposure.  Patient will be seen in 1 year for routine gynecologic and medical examination.  Patient will call office for any problems, concerns, or issues which may arise during the interim.     Subjective:          HPI    Patient ID: Elsy Garcia is a 54 y.o. female who presents today for her annual gynecologic and medical examination    Menstrual status: Patient is postmenopausal and denies any vaginal bleeding    Vasomotor symptoms: Occasional hot flashes    Patient reports normal appetite    Patient reports normal bowel and bladder habits    Patient denies any significant pelvic or abdominal pain    Patient denies any headaches, chest pain, shortness of breath fever shakes or chills    Patient denies any COVID 19 symptoms including cough or loss of taste or smell    COVID vaccine status: Aware of COVID vaccination protocols    Medical problems: No significant medical problems    Colonoscopy status: Up-to-date with screening colonoscopy.  Next is due in approximately 3 years.    Mammogram status: Does self breast exams and is up-to-date with screening mammography.  Appropriate arrangements for her annual screening mammogram were placed into the EMR system at today's visit.    The following portions of the patient's history were reviewed and updated as appropriate:  allergies, current medications, past family history, past medical history, past social history, past surgical history and problem list.    Review of Systems   Constitutional: Negative.  Negative for appetite change, diaphoresis, fatigue, fever and unexpected weight change.   HENT: Negative.     Eyes: Negative.    Respiratory: Negative.     Cardiovascular: Negative.    Gastrointestinal: Negative.  Negative for abdominal pain, blood in stool, constipation, diarrhea, nausea and vomiting.   Endocrine: Negative.  Negative for cold intolerance and heat intolerance.   Genitourinary: Negative.  Negative for dysuria, frequency, hematuria, urgency, vaginal bleeding, vaginal discharge and vaginal pain.   Musculoskeletal: Negative.    Skin: Negative.    Allergic/Immunologic: Negative.    Neurological: Negative.    Hematological: Negative.  Negative for adenopathy.   Psychiatric/Behavioral: Negative.           Objective:      /70   Wt 59.8 kg (131 lb 12.8 oz)   LMP 09/04/2020 (Exact Date)   BMI 24.90 kg/m²          Physical Exam  Constitutional:       General: She is not in acute distress.     Appearance: Normal appearance. She is well-developed. She is not diaphoretic.   HENT:      Head: Normocephalic and atraumatic.   Eyes:      Pupils: Pupils are equal, round, and reactive to light.   Cardiovascular:      Rate and Rhythm: Normal rate and regular rhythm.      Heart sounds: Normal heart sounds. No murmur heard.     No friction rub. No gallop.   Pulmonary:      Effort: Pulmonary effort is normal.      Breath sounds: Normal breath sounds.   Chest:   Breasts:     Breasts are symmetrical.      Right: No inverted nipple, mass, nipple discharge, skin change or tenderness.      Left: No inverted nipple, mass, nipple discharge, skin change or tenderness.   Abdominal:      General: Bowel sounds are normal.      Palpations: Abdomen is soft.   Genitourinary:     General: Normal vulva.      Exam position: Supine.      Labia:          Right: No rash or lesion.         Left: No rash or lesion.       Urethra: No urethral swelling or urethral lesion.      Vagina: Normal. No vaginal discharge, erythema, tenderness or bleeding.      Cervix: No discharge or friability.      Uterus: Not enlarged and not tender.       Adnexa:         Right: No mass, tenderness or fullness.          Left: No mass, tenderness or fullness.        Rectum: Normal. Guaiac result negative.      Comments: Pelvic exam revealed mild atrophic vaginitis  Good pelvic support confirmed  Musculoskeletal:         General: Normal range of motion.      Cervical back: Normal range of motion and neck supple.   Lymphadenopathy:      Cervical: No cervical adenopathy.      Upper Body:      Right upper body: No supraclavicular adenopathy.      Left upper body: No supraclavicular adenopathy.   Skin:     General: Skin is warm and dry.      Findings: No rash.   Neurological:      General: No focal deficit present.      Mental Status: She is alert and oriented to person, place, and time.   Psychiatric:         Mood and Affect: Mood normal.         Speech: Speech normal.         Behavior: Behavior normal.         Thought Content: Thought content normal.         Judgment: Judgment normal.

## 2025-01-16 LAB
CLINICAL INFO: NORMAL
CYTO CVX: NORMAL
DATE PREVIOUS BX: NORMAL
HPV E6+E7 MRNA CVX QL NAA+PROBE: NOT DETECTED
LMP START DATE: NORMAL
SL AMB PREV. PAP:: NORMAL
SPECIMEN SOURCE CVX/VAG CYTO: NORMAL
STAT OF ADQ CVX/VAG CYTO-IMP: NORMAL

## 2025-02-11 DIAGNOSIS — F41.9 ANXIETY: ICD-10-CM

## 2025-02-11 DIAGNOSIS — Z78.0 POSTMENOPAUSAL: ICD-10-CM

## 2025-02-12 ENCOUNTER — EVALUATION (OUTPATIENT)
Dept: PHYSICAL THERAPY | Facility: CLINIC | Age: 55
End: 2025-02-12
Payer: COMMERCIAL

## 2025-02-12 DIAGNOSIS — M25.511 ACUTE PAIN OF RIGHT SHOULDER: Primary | ICD-10-CM

## 2025-02-12 DIAGNOSIS — M75.41 IMPINGEMENT SYNDROME OF RIGHT SHOULDER: ICD-10-CM

## 2025-02-12 PROCEDURE — 97161 PT EVAL LOW COMPLEX 20 MIN: CPT | Performed by: PHYSICAL THERAPIST

## 2025-02-12 RX ORDER — VENLAFAXINE HYDROCHLORIDE 75 MG/1
75 CAPSULE, EXTENDED RELEASE ORAL
Qty: 90 CAPSULE | Refills: 1 | Status: SHIPPED | OUTPATIENT
Start: 2025-02-12

## 2025-02-12 NOTE — PROGRESS NOTES
PT Evaluation     Today's date: 2025  Patient name: Elsy Garcia  : 1970  MRN: [id number]  Referring provider: No ref. provider found  Dx:   Encounter Diagnosis     ICD-10-CM    1. Right shoulder pain, unspecified chronicity  M25.511                      Assessment  Impairments: abnormal or restricted ROM, abnormal movement, impaired physical strength, lacks appropriate home exercise program, pain with function, scapular dyskinesis, poor posture  and poor body mechanics    Assessment details: Pt presents with s/s consistent with R shoulder impingement with an underlying derangement responding to extension. She will benefit from skilled PT services to address her impairments in order to decrease pain and restore function.  Understanding of Dx/Px/POC: good     Prognosis: good    Goals  STG - 2-4 wks  1) pt will normalize R shoulder ROM  2) pt will demonstrate 4/5 ER strength  3) pt will improve pain 50%    LTG - 4-6 wks  1) pt will demonstrate 4+/5 ER strength  2) pt will resolve functional limitations  3) pt will resolve pain    Plan  Patient would benefit from: skilled physical therapy  Planned modality interventions: low level laser therapy    Planned therapy interventions: joint mobilization, manual therapy, body mechanics training, neuromuscular re-education, postural training, self care, stretching, strengthening, therapeutic activities, therapeutic exercise, home exercise program, graded activity, graded exercise, functional ROM exercises and flexibility    Frequency: 1x week  Duration in weeks: 6  Treatment plan discussed with: patient        Subjective Evaluation    History of Present Illness  Mechanism of injury: Pt is a 54 y.o. female with unremarkable PMHx. She reports sudden onset of superior and anterior R shoulder pain 8 wks ago when her large dog pulled hard on the leash. She denies trauma, instability, radiating symptoms, N/T, red flags. She reports significant relief with use of  Aleve x 1 wk.  Patient Goals  Patient goals for therapy: decreased pain, increased motion, increased strength and independence with ADLs/IADLs    Pain  Current pain ratin  At best pain ratin  At worst pain ratin  Quality: burning, sharp and pulling  Relieving factors: medications  Aggravating factors: lifting and overhead activity (functional reaching)      Diagnostic Tests  X-ray: normal  Treatments  No previous or current treatments        Objective     Active Range of Motion   Cervical/Thoracic Spine       Cervical  Subcranial protraction:  WFL   Subcranial retraction:   Restriction level: moderate  Flexion:  Restriction level: minimal  Extension:  Restriction level: moderate  Left lateral flexion:  Restriction level: maximal  Right lateral flexion:  Restriction level moderate  Left rotation:  Restriction level: moderate  Right rotation:  Restriction level: maximal  Left Shoulder   Flexion: 160 degrees   Abduction: 160 degrees   External rotation BTH: T1   Internal rotation BTB: T6     Right Shoulder   Flexion: 140 degrees with pain  Extension: 40 degrees   Abduction: 140 degrees with pain  External rotation BTH: T1 with pain  Internal rotation BTB: T6 with pain    Additional Active Range of Motion Details  Repeated Motions  R sh E NE,NE  R sh pt OP (cane) A,B (15 deg improvement, no pain active FF)  R TB ER P,W  Mechanical Assessment    Cervical    Seated Protrusion: repeated movements   NE,NE  Seated retraction: repeated movements   NE,NE    Thoracic      Lumbar      Strength/Myotome Testing     Left Shoulder     Planes of Motion   Flexion: 4   Abduction: 4   External rotation at 0°: 4-   Internal rotation at 0°: 4     Right Shoulder     Planes of Motion   Flexion: 3+   Abduction: 3+   External rotation at 45°: 3+   Internal rotation at 0°: 4-              Precautions: none  Dx: R shoulder impingement with an underlying derangement responding to extension    Manuals             Rep R sh E clin  OP                                                    Neuro Re-Ed             Rep R sh E standing 1x10            Rep R sh E pt OP 3x10            TB rows             Prone I's             Prone T's                                       Ther Ex                                                                                                                     Ther Activity                                       Gait Training                                       Modalities

## 2025-02-12 NOTE — LETTER
2025    Óscar Tompkins MD  [address] [place] [id number]    Patient: Elsy Garcia   YOB: 1970   Date of Visit: 2025     Encounter Diagnosis     ICD-10-CM    1. Acute pain of right shoulder  M25.511       2. Impingement syndrome of right shoulder  M75.41           Dear Dr. Tompkins:    Thank you for your recent referral of Elsy Garcia. Please review the attached evaluation summary from Elsy's recent visit.     Please verify that you agree with the plan of care by signing the attached order.     If you have any questions or concerns, please do not hesitate to call.     I sincerely appreciate the opportunity to share in the care of one of your patients and hope to have another opportunity to work with you in the near future.       Sincerely,    Chris Becerril, PT      Referring Provider:      I certify that I have read the below Plan of Care and certify the need for these services furnished under this plan of treatment while under my care.                    Óscar Tompkins MD  [address] [facility] [address]  Via Fax: [phone]          PT Evaluation     Today's date: 2025  Patient name: Elsy Garcia  : 1970  MRN: [id number]  Referring provider: No ref. provider found  Dx:   Encounter Diagnosis     ICD-10-CM    1. Right shoulder pain, unspecified chronicity  M25.511                      Assessment  Impairments: abnormal or restricted ROM, abnormal movement, impaired physical strength, lacks appropriate home exercise program, pain with function, scapular dyskinesis, poor posture  and poor body mechanics    Assessment details: Pt presents with s/s consistent with R shoulder impingement with an underlying derangement responding to extension. She will benefit from skilled PT services to address her impairments in order to decrease pain and restore function.  Understanding of Dx/Px/POC: good     Prognosis:  good    Goals  STG - 2-4 wks  1) pt will normalize R shoulder ROM  2) pt will demonstrate 4/5 ER strength  3) pt will improve pain 50%    LTG - 4-6 wks  1) pt will demonstrate 4+/5 ER strength  2) pt will resolve functional limitations  3) pt will resolve pain    Plan  Patient would benefit from: skilled physical therapy  Planned modality interventions: low level laser therapy    Planned therapy interventions: joint mobilization, manual therapy, body mechanics training, neuromuscular re-education, postural training, self care, stretching, strengthening, therapeutic activities, therapeutic exercise, home exercise program, graded activity, graded exercise, functional ROM exercises and flexibility    Frequency: 1x week  Duration in weeks: 6  Treatment plan discussed with: patient        Subjective Evaluation    History of Present Illness  Mechanism of injury: Pt is a 54 y.o. female with unremarkable PMHx. She reports sudden onset of superior and anterior R shoulder pain 8 wks ago when her large dog pulled hard on the leash. She denies trauma, instability, radiating symptoms, N/T, red flags. She reports significant relief with use of Aleve x 1 wk.  Patient Goals  Patient goals for therapy: decreased pain, increased motion, increased strength and independence with ADLs/IADLs    Pain  Current pain ratin  At best pain ratin  At worst pain ratin  Quality: burning, sharp and pulling  Relieving factors: medications  Aggravating factors: lifting and overhead activity (functional reaching)      Diagnostic Tests  X-ray: normal  Treatments  No previous or current treatments        Objective     Active Range of Motion   Cervical/Thoracic Spine       Cervical  Subcranial protraction:  WFL   Subcranial retraction:   Restriction level: moderate  Flexion:  Restriction level: minimal  Extension:  Restriction level: moderate  Left lateral flexion:  Restriction level: maximal  Right lateral flexion:  Restriction level  moderate  Left rotation:  Restriction level: moderate  Right rotation:  Restriction level: maximal  Left Shoulder   Flexion: 160 degrees   Abduction: 160 degrees   External rotation BTH: T1   Internal rotation BTB: T6     Right Shoulder   Flexion: 140 degrees with pain  Extension: 40 degrees   Abduction: 140 degrees with pain  External rotation BTH: T1 with pain  Internal rotation BTB: T6 with pain    Additional Active Range of Motion Details  Repeated Motions  R sh E NE,NE  R sh pt OP (cane) A,B (15 deg improvement, no pain active FF)  R TB ER P,W  Mechanical Assessment    Cervical    Seated Protrusion: repeated movements   NE,NE  Seated retraction: repeated movements   NE,NE    Thoracic      Lumbar      Strength/Myotome Testing     Left Shoulder     Planes of Motion   Flexion: 4   Abduction: 4   External rotation at 0°: 4-   Internal rotation at 0°: 4     Right Shoulder     Planes of Motion   Flexion: 3+   Abduction: 3+   External rotation at 45°: 3+   Internal rotation at 0°: 4-              Precautions: none  Dx: R shoulder impingement with an underlying derangement responding to extension    Manuals 2/12            Rep R sh E clin OP                                                    Neuro Re-Ed             Rep R sh E standing 1x10            Rep R sh E pt OP 3x10            TB rows             Prone I's             Prone T's                                       Ther Ex                                                                                                                     Ther Activity                                       Gait Training                                       Modalities

## 2025-02-19 ENCOUNTER — OFFICE VISIT (OUTPATIENT)
Dept: PHYSICAL THERAPY | Facility: CLINIC | Age: 55
End: 2025-02-19
Payer: COMMERCIAL

## 2025-02-19 DIAGNOSIS — M75.41 IMPINGEMENT SYNDROME OF RIGHT SHOULDER: ICD-10-CM

## 2025-02-19 DIAGNOSIS — M25.511 ACUTE PAIN OF RIGHT SHOULDER: Primary | ICD-10-CM

## 2025-02-19 PROCEDURE — 97112 NEUROMUSCULAR REEDUCATION: CPT | Performed by: PHYSICAL THERAPIST

## 2025-02-19 NOTE — PROGRESS NOTES
Daily Note     Today's date: 2025  Patient name: Elsy Garcia  : 1970  MRN: [id number]  Referring provider: Óscar Tompkins MD  Dx:   Encounter Diagnosis     ICD-10-CM    1. Acute pain of right shoulder  M25.511       2. Impingement syndrome of right shoulder  M75.41                      Subjective: Pt reports fair compliance with HEP, notes temporary relief during and immediately after but has also concurrently d/c'ed oral NSAIDs.    Objective: See treatment diary below    Updated HEP to include scap stab 1xD    Assessment: Pt demonstrated confirmation of R shoulder extension DP, improved strength and full ROM following progression of pt OP, and able to maintain reduction during scap stab.    Plan: Cont POC.     Precautions: none  Dx: R shoulder impingement with an underlying derangement responding to extension    Manuals            Rep R sh E clin OP                                                    Neuro Re-Ed             Rep R sh E standing 1x10            Rep R sh E pt OP 3x10 1x15           Rep R sh E pt OP high-lo table  1x10           TB rows  G/  3x15           Prone I's  3x10           Prone T's  3x10                                     Ther Ex                                                                                                                     Ther Activity                                       Gait Training                                       Modalities

## 2025-02-25 ENCOUNTER — APPOINTMENT (OUTPATIENT)
Dept: PHYSICAL THERAPY | Facility: CLINIC | Age: 55
End: 2025-02-25
Payer: COMMERCIAL

## 2025-02-26 ENCOUNTER — APPOINTMENT (OUTPATIENT)
Dept: PHYSICAL THERAPY | Facility: CLINIC | Age: 55
End: 2025-02-26
Payer: COMMERCIAL

## 2025-03-05 ENCOUNTER — OFFICE VISIT (OUTPATIENT)
Dept: PHYSICAL THERAPY | Facility: CLINIC | Age: 55
End: 2025-03-05
Payer: COMMERCIAL

## 2025-03-05 DIAGNOSIS — M25.511 ACUTE PAIN OF RIGHT SHOULDER: Primary | ICD-10-CM

## 2025-03-05 DIAGNOSIS — M75.41 IMPINGEMENT SYNDROME OF RIGHT SHOULDER: ICD-10-CM

## 2025-03-05 PROCEDURE — 97112 NEUROMUSCULAR REEDUCATION: CPT | Performed by: PHYSICAL THERAPIST

## 2025-03-05 NOTE — PROGRESS NOTES
Daily Note     Today's date: 3/5/2025  Patient name: Elsy Garcia  : 1970  MRN: [id number]  Referring provider: Óscar Tompkins MD  Dx:   Encounter Diagnosis     ICD-10-CM    1. Acute pain of right shoulder  M25.511       2. Impingement syndrome of right shoulder  M75.41                      Subjective: Pt reports no pain at rest but no change in pain with functional reaching or lifting. She reports Rep R sh EXT pt OP    Objective: See treatment diary below    Rep R sh IR P,W  Rep R sh E with ER D,B    Updated HEP for ecc R sh ABD    Assessment: Pt demonstrated re-confirmation of R shoulder extension DP with ER but a poor response to force progression. Pt may also present with a R sh contractile dysfunction to be confirmed upon resolution of her derangement.    Plan: Cont POC. Trial TB ER to assess for resolution of her derangement.     Precautions: none  Dx: R shoulder impingement with an underlying derangement responding to extension    Manuals  3/5          Rep R sh E clin OP   np                                                 Neuro Re-Ed             Rep R sh E standing 1x10            Rep R sh E pt OP 3x10 1x15 2x15    + ER  2x10          Rep R sh E pt OP high-lo table  1x10 D/c          TB rows  G/  3x15 Black  3x10          Prone I's  3x10 2# ea  3x10          Prone T's  3x10 1# ea  3x10          Ecc R sh ABD   1#  1x10    2#  2x10                       Ther Ex                                                                                                                     Ther Activity                                       Gait Training                                       Modalities

## 2025-03-12 ENCOUNTER — APPOINTMENT (OUTPATIENT)
Dept: PHYSICAL THERAPY | Facility: CLINIC | Age: 55
End: 2025-03-12
Payer: COMMERCIAL

## 2025-03-19 ENCOUNTER — OFFICE VISIT (OUTPATIENT)
Dept: PHYSICAL THERAPY | Facility: CLINIC | Age: 55
End: 2025-03-19
Payer: COMMERCIAL

## 2025-03-19 DIAGNOSIS — M25.511 ACUTE PAIN OF RIGHT SHOULDER: Primary | ICD-10-CM

## 2025-03-19 DIAGNOSIS — M75.41 IMPINGEMENT SYNDROME OF RIGHT SHOULDER: ICD-10-CM

## 2025-03-19 PROCEDURE — 97112 NEUROMUSCULAR REEDUCATION: CPT

## 2025-03-19 NOTE — PROGRESS NOTES
Daily Note     Today's date: 3/19/2025  Patient name: Elsy Garcia  : 1970  MRN: [id number]  Referring provider: Óscar Tompkins MD  Dx:   Encounter Diagnosis     ICD-10-CM    1. Acute pain of right shoulder  M25.511       2. Impingement syndrome of right shoulder  M75.41                      Subjective: Pt reports no pain since having her shot last week.     Objective: See treatment diary below    Assessment: Pt demonstrated no change in shoulder strength or ROM following TB ER - added to HEP. Pt was able to progress scapular strengthening as per flow sheet.     Plan: Cont POC.      Precautions: none  Dx: R shoulder impingement with an underlying derangement responding to extension    Manuals 2/12 2/19 3/5 3/19         Rep R sh E clin OP   np                                                 Neuro Re-Ed             Rep R sh E standing 1x10            Rep R sh E pt OP 3x10 1x15 2x15    + ER  2x10 2x15 + ER         Rep R sh E pt OP high-lo table  1x10 D/c          TB rows  G/  3x15 Black  3x10 Black  3x15         Prone I's  3x10 2# ea  3x10 2# ea  3x15         Prone T's  3x10 1# ea  3x10 1# ea 3x10         Ecc R sh ABD   1#  1x10    2#  2x10 3#  3x10         TB resisted shoulder ER    YTB/ 1x10  GTB/  2x10         Ther Ex                                                                                                                     Ther Activity                                       Gait Training                                       Modalities

## 2025-04-10 ENCOUNTER — HOSPITAL ENCOUNTER (OUTPATIENT)
Dept: MAMMOGRAPHY | Facility: HOSPITAL | Age: 55
Discharge: HOME | End: 2025-04-10
Attending: OBSTETRICS & GYNECOLOGY
Payer: COMMERCIAL

## 2025-04-10 VITALS — HEIGHT: 61 IN | BODY MASS INDEX: 24.89 KG/M2 | WEIGHT: 131.84 LBS

## 2025-04-10 DIAGNOSIS — Z12.31 ENCOUNTER FOR SCREENING MAMMOGRAM FOR MALIGNANT NEOPLASM OF BREAST: ICD-10-CM

## 2025-04-10 PROCEDURE — 77067 SCR MAMMO BI INCL CAD: CPT

## 2025-04-10 PROCEDURE — 77063 BREAST TOMOSYNTHESIS BI: CPT

## 2025-05-28 ENCOUNTER — OFFICE VISIT (OUTPATIENT)
Age: 55
End: 2025-05-28
Payer: COMMERCIAL

## 2025-05-28 DIAGNOSIS — N95.1 MENOPAUSAL SYMPTOMS: Primary | ICD-10-CM

## 2025-05-28 PROBLEM — H53.9 VISION DISTURBANCE: Status: RESOLVED | Noted: 2019-07-08 | Resolved: 2025-05-28

## 2025-05-28 PROBLEM — M77.11 LATERAL EPICONDYLITIS OF RIGHT ELBOW: Status: RESOLVED | Noted: 2023-08-10 | Resolved: 2025-05-28

## 2025-05-28 PROBLEM — J34.89 NASAL OBSTRUCTION: Status: RESOLVED | Noted: 2024-08-12 | Resolved: 2025-05-28

## 2025-05-28 PROBLEM — G56.01 RIGHT CARPAL TUNNEL SYNDROME: Status: RESOLVED | Noted: 2022-01-17 | Resolved: 2025-05-28

## 2025-05-28 PROBLEM — R09.81 NASAL CONGESTION: Status: RESOLVED | Noted: 2024-08-12 | Resolved: 2025-05-28

## 2025-05-28 PROBLEM — U07.1 COVID-19: Status: RESOLVED | Noted: 2021-03-29 | Resolved: 2025-05-28

## 2025-05-28 PROBLEM — R79.89 ELEVATED BRAIN NATRIURETIC PEPTIDE (BNP) LEVEL: Status: RESOLVED | Noted: 2019-07-24 | Resolved: 2025-05-28

## 2025-05-28 PROBLEM — E04.9 ENLARGED THYROID: Status: RESOLVED | Noted: 2023-08-10 | Resolved: 2025-05-28

## 2025-05-28 PROBLEM — Z80.0 FAMILY HISTORY OF COLON CANCER: Status: RESOLVED | Noted: 2018-08-23 | Resolved: 2025-05-28

## 2025-05-28 PROBLEM — D17.1 LIPOMA OF ANTERIOR CHEST WALL: Status: RESOLVED | Noted: 2021-06-10 | Resolved: 2025-05-28

## 2025-05-28 PROBLEM — Z00.00 WELLNESS EXAMINATION: Status: RESOLVED | Noted: 2018-07-06 | Resolved: 2025-05-28

## 2025-05-28 PROCEDURE — 99215 OFFICE O/P EST HI 40 MIN: CPT | Performed by: OBSTETRICS & GYNECOLOGY

## 2025-05-28 RX ORDER — PROGESTERONE 100 MG/1
100 CAPSULE ORAL
Qty: 30 CAPSULE | Refills: 11 | Status: SHIPPED | OUTPATIENT
Start: 2025-05-28

## 2025-05-28 RX ORDER — ESTRADIOL 0.05 MG/D
1 PATCH, EXTENDED RELEASE TRANSDERMAL 2 TIMES WEEKLY
Qty: 8 PATCH | Refills: 11 | Status: SHIPPED | OUTPATIENT
Start: 2025-05-29

## 2025-05-31 NOTE — PROGRESS NOTES
:  Assessment & Plan  Menopausal symptoms    Orders:    estradiol (Sanaz) 0.05 MG/24HR; Place 1 patch on the skin 2 (two) times a week    Progesterone 100 MG CAPS; Take 100 mg by mouth at bedtime      1) Her symptoms are classic for ovarian hormone loss. I discussed the long term health benefits of E2/PG, including: reduction of CVD risk, reduction of hyperlipidemia, reduction of DM and GLP-1 agonist activity with mild appetite suppression; reduction of colon CA, osteoporosis and cognitive decline, Alzheimer's disease.  2) Controversies surrounding estrogen based HRT discussed, including the fear based off the [facility] trials concerning Prempro. I will not be prescribing Prempro. One should not assume all therapies confer the same risk or benefit. Stay tuned to the REPLEN[place] trials for E2/PG therapy in the [place] with Bijuva, but [place] trials with same medication point to safety and efficacy with superior health outcomes with women on this type of HRT.  3) The best time to target CVD and atherosclerosis is within the first 5 years of menopause with oral estrogen  therapy. Unfortunately, this protective time frame has been lost, with AMI risk mildly increasing within first year of oral therapy use in both [facility] and  trials. This risk is mitigated with transdermal therapy. Oral progesterone still preferred, no CVD or VTE risk.   4) She is willing to try it. Estradiol patch 0.05 mg/ oral progesterone 100 mg nightly issued. Side effects of HRT reviewed including vaginal bleeding, breast tenderness and somnolence, easily remedied with dose adjustment.   5) Libido is a testosterone concern, but may improve once vaginal health improves. If still an issue in a few months, will offer to draw levels and have follow up visit to discuss testosterone replacement in more detail.  6) Email with progress report in 1 month, titrating doses as needed. Follow up prn or one year.     This was a 50 minute visit with greater  "than 50% of time spent in face to face counseling and coordination of care      Angle presents for hormonal consult, her first visit with me; referred by PCP, sees Dr. Shepherd for gyn care with [facility]  Angle has been postmenopausal for 5 years, complains of:  1) Hot flashes: \" brutal\". PCP offered Effexor, which did not help hot flash, but sleep improved. Other supplements tried, minimal relief  2) Decreased libido  PMHX: hypothyroid; breast biopsies, lumpectomy with benign results   SHX: denies tobacco ETOH  FHX: mom AMI, Afib CHF, alive at 91. MGM osteoporosis; MGF AMI. Dad COD suicide 50, alcoholic. PGM old age; PGF? 2 brothers, ETOH. 4 kids: 25/23/19/17, all healthy.    Review of Systems   Constitutional: Negative.    Endocrine: Positive for heat intolerance.   Genitourinary:         Decreased libido   Musculoskeletal: Negative.    Skin: Negative.    Neurological: Negative.    Psychiatric/Behavioral:  Positive for sleep disturbance.         Physical Exam  Constitutional:       Appearance: Normal appearance. She is normal weight.     Neurological:      Mental Status: She is alert.       "

## 2025-07-02 DIAGNOSIS — N95.1 MENOPAUSAL SYMPTOMS: ICD-10-CM

## 2025-07-03 RX ORDER — PROGESTERONE 100 MG/1
100 CAPSULE ORAL
Qty: 90 CAPSULE | Refills: 1 | Status: SHIPPED | OUTPATIENT
Start: 2025-07-03

## 2025-07-03 RX ORDER — ESTRADIOL 0.05 MG/D
1 PATCH, EXTENDED RELEASE TRANSDERMAL 2 TIMES WEEKLY
Qty: 24 PATCH | Refills: 1 | Status: SHIPPED | OUTPATIENT
Start: 2025-07-03

## (undated) DEVICE — 3M™ TEGADERM™ TRANSPARENT FILM DRESSING FRAME STYLE, 1626W, 4 IN X 4-3/4 IN (10 CM X 12 CM), 50/CT 4CT/CASE: Brand: 3M™ TEGADERM™

## (undated) DEVICE — SUT MONOCRYL 3-0 SH 27 IN Y416H

## (undated) DEVICE — PLUMEPEN PRO 10FT

## (undated) DEVICE — SHEATH, GUIDE, SAVI SCOUT®: Brand: SAVI SCOUT®

## (undated) DEVICE — VIAL DECANTER

## (undated) DEVICE — SUT SILK 2-0 SH 30 IN K833H

## (undated) DEVICE — ADHESIVE SKIN HIGH VISCOSITY EXOFIN 1ML

## (undated) DEVICE — GAUZE SPONGES,16 PLY: Brand: CURITY

## (undated) DEVICE — NEEDLE 25GA X 1 IN SAFETY GLIDE

## (undated) DEVICE — MEDI-VAC YANK SUCT HNDL W/TPRD BULBOUS TIP: Brand: CARDINAL HEALTH

## (undated) DEVICE — SPECIMEN CONTAINER STERILE PEEL PACK

## (undated) DEVICE — BETHLEHEM UNIVERSAL MINOR GEN: Brand: CARDINAL HEALTH

## (undated) DEVICE — TUBING SUCTION 5MM X 12 FT

## (undated) DEVICE — INTENDED FOR TISSUE SEPARATION, AND OTHER PROCEDURES THAT REQUIRE A SHARP SURGICAL BLADE TO PUNCTURE OR CUT.: Brand: BARD-PARKER SAFETY BLADES SIZE 15, STERILE

## (undated) DEVICE — DRAPE SHEET THREE QUARTER

## (undated) DEVICE — PAD GROUNDING ADULT

## (undated) DEVICE — GLOVE SRG BIOGEL 6

## (undated) DEVICE — SUT MONOCRYL 4-0 PS-2 18 IN Y496G

## (undated) DEVICE — CHLORAPREP HI-LITE 26ML ORANGE

## (undated) DEVICE — LIGHT HANDLE COVER SLEEVE DISP BLUE STELLAR

## (undated) DEVICE — ENDOCUFF VISION LRG GREEN ID 11.2